# Patient Record
Sex: FEMALE | Race: WHITE | NOT HISPANIC OR LATINO | Employment: OTHER | URBAN - METROPOLITAN AREA
[De-identification: names, ages, dates, MRNs, and addresses within clinical notes are randomized per-mention and may not be internally consistent; named-entity substitution may affect disease eponyms.]

---

## 2017-04-05 ENCOUNTER — OFFICE VISIT (OUTPATIENT)
Dept: URGENT CARE | Facility: MEDICAL CENTER | Age: 57
End: 2017-04-05
Payer: MEDICARE

## 2017-04-05 PROCEDURE — 99213 OFFICE O/P EST LOW 20 MIN: CPT

## 2017-04-05 PROCEDURE — G0463 HOSPITAL OUTPT CLINIC VISIT: HCPCS

## 2017-05-19 ENCOUNTER — TRANSCRIBE ORDERS (OUTPATIENT)
Dept: ADMINISTRATIVE | Facility: HOSPITAL | Age: 57
End: 2017-05-19

## 2017-05-19 ENCOUNTER — APPOINTMENT (OUTPATIENT)
Dept: LAB | Facility: MEDICAL CENTER | Age: 57
End: 2017-05-19
Payer: MEDICARE

## 2017-05-19 DIAGNOSIS — R10.32 ABDOMINAL PAIN, LEFT LOWER QUADRANT: Primary | ICD-10-CM

## 2017-05-19 DIAGNOSIS — R11.10 VOMITING, INTRACTABILITY OF VOMITING NOT SPECIFIED, PRESENCE OF NAUSEA NOT SPECIFIED, UNSPECIFIED VOMITING TYPE: ICD-10-CM

## 2017-05-19 DIAGNOSIS — K76.0 FATTY METAMORPHOSIS OF LIVER: ICD-10-CM

## 2017-05-19 DIAGNOSIS — R11.0 NAUSEA ALONE: ICD-10-CM

## 2017-05-19 LAB
ALBUMIN SERPL BCP-MCNC: 3.7 G/DL (ref 3.5–5)
ALP SERPL-CCNC: 103 U/L (ref 46–116)
ALT SERPL W P-5'-P-CCNC: 27 U/L (ref 12–78)
ANION GAP SERPL CALCULATED.3IONS-SCNC: 7 MMOL/L (ref 4–13)
AST SERPL W P-5'-P-CCNC: 20 U/L (ref 5–45)
BILIRUB DIRECT SERPL-MCNC: 0.14 MG/DL (ref 0–0.2)
BILIRUB SERPL-MCNC: 0.37 MG/DL (ref 0.2–1)
BUN SERPL-MCNC: 16 MG/DL (ref 5–25)
CALCIUM SERPL-MCNC: 9.2 MG/DL (ref 8.3–10.1)
CHLORIDE SERPL-SCNC: 104 MMOL/L (ref 100–108)
CO2 SERPL-SCNC: 26 MMOL/L (ref 21–32)
CREAT SERPL-MCNC: 0.81 MG/DL (ref 0.6–1.3)
ERYTHROCYTE [DISTWIDTH] IN BLOOD BY AUTOMATED COUNT: 17.7 % (ref 11.6–15.1)
FERRITIN SERPL-MCNC: 13 NG/ML (ref 8–388)
GFR SERPL CREATININE-BSD FRML MDRD: >60 ML/MIN/1.73SQ M
GLUCOSE P FAST SERPL-MCNC: 95 MG/DL (ref 65–99)
HBV CORE AB SER QL: NORMAL
HBV CORE IGM SER QL: NORMAL
HBV SURFACE AG SER QL: NORMAL
HCT VFR BLD AUTO: 40.7 % (ref 34.8–46.1)
HCV AB SER QL: NORMAL
HGB BLD-MCNC: 12.8 G/DL (ref 11.5–15.4)
IRON SATN MFR SERPL: 10 %
IRON SERPL-MCNC: 43 UG/DL (ref 50–170)
LIPASE SERPL-CCNC: 155 U/L (ref 73–393)
MCH RBC QN AUTO: 24.7 PG (ref 26.8–34.3)
MCHC RBC AUTO-ENTMCNC: 31.4 G/DL (ref 31.4–37.4)
MCV RBC AUTO: 79 FL (ref 82–98)
PLATELET # BLD AUTO: 494 THOUSANDS/UL (ref 149–390)
PMV BLD AUTO: 11 FL (ref 8.9–12.7)
POTASSIUM SERPL-SCNC: 4.2 MMOL/L (ref 3.5–5.3)
PROT SERPL-MCNC: 8.4 G/DL (ref 6.4–8.2)
RBC # BLD AUTO: 5.18 MILLION/UL (ref 3.81–5.12)
SODIUM SERPL-SCNC: 137 MMOL/L (ref 136–145)
TIBC SERPL-MCNC: 436 UG/DL (ref 250–450)
WBC # BLD AUTO: 10.83 THOUSAND/UL (ref 4.31–10.16)

## 2017-05-19 PROCEDURE — 85027 COMPLETE CBC AUTOMATED: CPT | Performed by: INTERNAL MEDICINE

## 2017-05-19 PROCEDURE — 86704 HEP B CORE ANTIBODY TOTAL: CPT | Performed by: INTERNAL MEDICINE

## 2017-05-19 PROCEDURE — 82728 ASSAY OF FERRITIN: CPT | Performed by: INTERNAL MEDICINE

## 2017-05-19 PROCEDURE — 86705 HEP B CORE ANTIBODY IGM: CPT | Performed by: INTERNAL MEDICINE

## 2017-05-19 PROCEDURE — 80048 BASIC METABOLIC PNL TOTAL CA: CPT | Performed by: INTERNAL MEDICINE

## 2017-05-19 PROCEDURE — 83690 ASSAY OF LIPASE: CPT | Performed by: INTERNAL MEDICINE

## 2017-05-19 PROCEDURE — 87340 HEPATITIS B SURFACE AG IA: CPT | Performed by: INTERNAL MEDICINE

## 2017-05-19 PROCEDURE — 86235 NUCLEAR ANTIGEN ANTIBODY: CPT | Performed by: INTERNAL MEDICINE

## 2017-05-19 PROCEDURE — 36415 COLL VENOUS BLD VENIPUNCTURE: CPT | Performed by: INTERNAL MEDICINE

## 2017-05-19 PROCEDURE — 83550 IRON BINDING TEST: CPT | Performed by: INTERNAL MEDICINE

## 2017-05-19 PROCEDURE — 86038 ANTINUCLEAR ANTIBODIES: CPT | Performed by: INTERNAL MEDICINE

## 2017-05-19 PROCEDURE — 83540 ASSAY OF IRON: CPT | Performed by: INTERNAL MEDICINE

## 2017-05-19 PROCEDURE — 80076 HEPATIC FUNCTION PANEL: CPT | Performed by: INTERNAL MEDICINE

## 2017-05-19 PROCEDURE — 86803 HEPATITIS C AB TEST: CPT | Performed by: INTERNAL MEDICINE

## 2017-05-20 LAB — ACTIN IGG SERPL-ACNC: 10 UNITS (ref 0–19)

## 2017-05-22 LAB — RYE IGE QN: NEGATIVE

## 2018-01-13 NOTE — PROGRESS NOTES
Assessment   1  Scabies (133 0) (B86)    Plan   Scabies    · Permethrin 5 % External Cream; MASSAGE INTO SKIN FROM HEAD TO SOLES OF    FEET  [address] OFF AFTER 8-14 HOURS    Discussion/Summary   Discussion Summary:    Use permethrin cream as directed  Medication Side Effects Reviewed: Possible side effects of new medications were reviewed with the patient/guardian today  Understands and agrees with treatment plan: The treatment plan was reviewed with the patient/guardian  The patient/guardian understands and agrees with the treatment plan    Counseling Documentation With Imm: The patient was counseled regarding diagnostic results,-- instructions for management,-- risk factor reductions,-- prognosis,-- patient and family education,-- impressions,-- risks and benefits of treatment options,-- importance of compliance with treatment  Follow Up Instructions: Follow Up with your Primary Care Provider in 1-2 days  If your symptoms worsen, go to the nearest [place] [address] Emergency Department  Chief Complaint   1  Pain  Chief Complaint Free Text Note Form: Patient states that she is having whole body pain and itchy which started 3 days ago  She has been taking Benadryl with no release  Her last dose was at noon      History of Present Illness   Hospital Based Practices Required Assessment:      Pain Assessment      the patient states they have pain  The pain is located in the whole pain  The patient describes the pain as sharp  (on a scale of 0 to 10, the patient rates the pain at 1 )      Abuse And Domestic Violence Screen       Yes, the patient is safe at home  -- The patient states no one is hurting them  Depression And Suicide Screen  No, the patient has not had thoughts of hurting themself  No, the patient has not felt depressed in the past 7 days  Prefered Language is  english  Primary Language is  english  Readiness To Learn: Receptive  Barriers To Learning: none  Preferred Learning: verbal    Scabies (Brief): The patient is being seen for an initial evaluation of scabies  Symptoms:  rash,-- widespread rash,-- grouped skin lesions,-- generalized pruritus-- and-- nocturnal pruritus, but-- no localized pruritus  The patient is currently experiencing symptoms  Review of Systems   Focused-Female:      Constitutional: No fever, no chills, feels well, no tiredness, no recent weight gain or loss  ENT: no ear ache, no loss of hearing, no nosebleeds or nasal discharge, no sore throat or hoarseness  Cardiovascular: no complaints of slow or fast heart rate, no chest pain, no palpitations, no leg claudication or lower extremity edema  Respiratory: no complaints of shortness of breath, no wheezing, no dyspnea on exertion, no orthopnea or PND  Breasts: no complaints of breast pain, breast lump or nipple discharge  Gastrointestinal: no complaints of abdominal pain, no constipation, no nausea or diarrhea, no vomiting, no bloody stools  Genitourinary: no complaints of dysuria, no incontinence, no pelvic pain, no dysmenorrhea, no vaginal discharge or abnormal vaginal bleeding  Musculoskeletal: no complaints of arthralgia, no myalgia, no joint swelling or stiffness, no limb pain or swelling  Integumentary: no complaints of skin rash or lesion, no itching or dry skin, no skin wounds-- and-- as noted in HPI  Neurological: no complaints of headache, no confusion, no numbness or tingling, no dizziness or fainting  Active Problems   1  Cervical spondylosis (721 0) (Y09 580)    Past Medical History   Active Problems And Past Medical History Reviewed: The active problems and past medical history were reviewed and updated today  Family History   Family History    1  Family history of Arthritis (716 90) (M19 90)   2  Family history of Cancer (199 1) (C80 1)   3  Family history of Osteoporosis (733 00) (M81 0)  Family History Reviewed:     The family history was reviewed and updated today  Social History    · Nonsmoker (V41 89) (Z78 9)  Social History Reviewed: The social history was reviewed and updated today  Surgical History   1  History of Back Surgery   2  History of Tubal Ligation  Surgical History Reviewed: The surgical history was reviewed and updated today  Current Meds    1  Benazepril-Hydrochlorothiazide 10-12 5 MG Oral Tablet; Therapy: 69YKU6706 to (Evaluate:59Rbu0825) Recorded   2  Gabapentin 300 MG Oral Capsule; Therapy: 96MUP0502 to (Evaluate:73Dft2063) Recorded   3  Methocarbamol 500 MG Oral Tablet; Therapy: 18YTW8481 to (Evaluate:10Yof9858) Recorded   4  MethylPREDNISolone (Parth) 4 MG TABS; take as directed on patient instruction card; Therapy: 62EGY0494 to (Last Rx:19Jun2014)  Requested for: 72CQB7022 Ordered   5  Naproxen 500 MG Oral Tablet; Therapy: 50URX9553 to (Evaluate:29Jun2014) Recorded   6  Omeprazole 20 MG Oral Capsule Delayed Release; Therapy: 89XAX5207 to (Evaluate:73Jmy0190) Recorded  Medication List Reviewed: The medication list was reviewed and updated today  Allergies   1  Penicillins    Vitals   Signs   Recorded: 99SJS5971 03:16PM   Temperature: 98 6 F  Heart Rate: 112  Respiration: 16  Systolic: 073  Diastolic: 80  Weight: 288 lb   BMI Calculated: 39 53  BSA Calculated: 2 28  O2 Saturation: 98  Pain Scale: 0    Physical Exam        Constitutional      General appearance: No acute distress, well appearing and well nourished  Pulmonary      Respiratory effort: No increased work of breathing or signs of respiratory distress  Auscultation of lungs: Clear to auscultation  Cardiovascular      Auscultation of heart: Normal rate and rhythm, normal S1 and S2, without murmurs  Lymphatic      Palpation of lymph nodes in neck: No lymphadenopathy         Skin      Examination of the skin for lesions: Abnormal  -- Erythematous papular lesions on flexor surface of bilateral arms, knees and abdomen        Signatures    Electronically signed by : Liz Gaines, [address]; Jan 12 2018 12:29AM EST                       (Author)     Electronically signed by : SKIP Jhaveri ; Jan 12 2018  3:51PM EST                       (Co-author)

## 2018-09-06 ENCOUNTER — APPOINTMENT (OUTPATIENT)
Dept: LAB | Facility: MEDICAL CENTER | Age: 58
End: 2018-09-06
Payer: MEDICARE

## 2018-09-06 ENCOUNTER — TRANSCRIBE ORDERS (OUTPATIENT)
Dept: ADMINISTRATIVE | Facility: HOSPITAL | Age: 58
End: 2018-09-06

## 2018-09-06 DIAGNOSIS — B96.81 GASTRITIS, HELICOBACTER PYLORI: Primary | ICD-10-CM

## 2018-09-06 DIAGNOSIS — K29.70 GASTRITIS, HELICOBACTER PYLORI: Primary | ICD-10-CM

## 2018-09-06 PROCEDURE — 87338 HPYLORI STOOL AG IA: CPT | Performed by: NURSE PRACTITIONER

## 2018-09-07 LAB — H PYLORI AG STL QL IA: NEGATIVE

## 2018-09-20 ENCOUNTER — TRANSCRIBE ORDERS (OUTPATIENT)
Dept: ADMINISTRATIVE | Facility: HOSPITAL | Age: 58
End: 2018-09-20

## 2018-09-20 DIAGNOSIS — K76.0 FATTY METAMORPHOSIS OF LIVER: ICD-10-CM

## 2018-09-20 DIAGNOSIS — R10.11 ABDOMINAL PAIN, RIGHT UPPER QUADRANT: Primary | ICD-10-CM

## 2018-09-20 DIAGNOSIS — R94.5 NONSPECIFIC ABNORMAL RESULTS OF LIVER FUNCTION STUDY: ICD-10-CM

## 2018-09-28 ENCOUNTER — HOSPITAL ENCOUNTER (OUTPATIENT)
Dept: RADIOLOGY | Facility: MEDICAL CENTER | Age: 58
Discharge: HOME | End: 2018-09-28
Payer: MEDICARE

## 2018-09-28 ENCOUNTER — TRANSCRIBE ORDERS (OUTPATIENT)
Dept: ADMINISTRATIVE | Facility: HOSPITAL | Age: 58
End: 2018-09-28

## 2018-09-28 ENCOUNTER — APPOINTMENT (OUTPATIENT)
Dept: LAB | Facility: MEDICAL CENTER | Age: 58
End: 2018-09-28
Payer: MEDICARE

## 2018-09-28 DIAGNOSIS — K76.0 FATTY LIVER: Primary | ICD-10-CM

## 2018-09-28 DIAGNOSIS — K76.0 FATTY METAMORPHOSIS OF LIVER: ICD-10-CM

## 2018-09-28 DIAGNOSIS — R79.89 ABNORMAL LFTS: ICD-10-CM

## 2018-09-28 DIAGNOSIS — R94.5 NONSPECIFIC ABNORMAL RESULTS OF LIVER FUNCTION STUDY: ICD-10-CM

## 2018-09-28 DIAGNOSIS — K76.0 FATTY LIVER: ICD-10-CM

## 2018-09-28 DIAGNOSIS — R10.11 ABDOMINAL PAIN, RIGHT UPPER QUADRANT: ICD-10-CM

## 2018-09-28 LAB
ALBUMIN SERPL BCP-MCNC: 3.7 G/DL (ref 3.5–5)
ALP SERPL-CCNC: 110 U/L (ref 46–116)
ALT SERPL W P-5'-P-CCNC: 33 U/L (ref 12–78)
AST SERPL W P-5'-P-CCNC: 25 U/L (ref 5–45)
BILIRUB DIRECT SERPL-MCNC: 0.17 MG/DL (ref 0–0.2)
BILIRUB SERPL-MCNC: 0.55 MG/DL (ref 0.2–1)
PROT SERPL-MCNC: 8.8 G/DL (ref 6.4–8.2)

## 2018-09-28 PROCEDURE — 36415 COLL VENOUS BLD VENIPUNCTURE: CPT

## 2018-09-28 PROCEDURE — 76705 ECHO EXAM OF ABDOMEN: CPT

## 2018-09-28 PROCEDURE — 80076 HEPATIC FUNCTION PANEL: CPT

## 2019-06-26 ENCOUNTER — APPOINTMENT (OUTPATIENT)
Dept: LAB | Facility: MEDICAL CENTER | Age: 59
End: 2019-06-26
Payer: MEDICARE

## 2019-06-26 ENCOUNTER — TRANSCRIBE ORDERS (OUTPATIENT)
Dept: ADMINISTRATIVE | Facility: HOSPITAL | Age: 59
End: 2019-06-26

## 2019-06-26 DIAGNOSIS — K76.0 FATTY LIVER: Primary | ICD-10-CM

## 2019-06-26 DIAGNOSIS — E78.00 PURE HYPERCHOLESTEROLEMIA: Primary | ICD-10-CM

## 2019-06-26 DIAGNOSIS — E03.9 HYPOTHYROIDISM, UNSPECIFIED TYPE: ICD-10-CM

## 2019-06-26 DIAGNOSIS — I10 ESSENTIAL HYPERTENSION, BENIGN: ICD-10-CM

## 2019-06-26 DIAGNOSIS — I49.9 VENTRICULAR ARRHYTHMIA: ICD-10-CM

## 2019-06-26 DIAGNOSIS — R79.89 ELEVATED LFTS: ICD-10-CM

## 2019-06-26 LAB
ALBUMIN SERPL BCP-MCNC: 3.6 G/DL (ref 3.5–5)
ALP SERPL-CCNC: 102 U/L (ref 46–116)
ALT SERPL W P-5'-P-CCNC: 29 U/L (ref 12–78)
ANION GAP SERPL CALCULATED.3IONS-SCNC: 8 MMOL/L (ref 4–13)
AST SERPL W P-5'-P-CCNC: 30 U/L (ref 5–45)
BASOPHILS # BLD AUTO: 0.07 THOUSANDS/ΜL (ref 0–0.1)
BASOPHILS NFR BLD AUTO: 1 % (ref 0–1)
BILIRUB DIRECT SERPL-MCNC: 0.15 MG/DL (ref 0–0.2)
BILIRUB SERPL-MCNC: 0.39 MG/DL (ref 0.2–1)
BUN SERPL-MCNC: 14 MG/DL (ref 5–25)
CALCIUM SERPL-MCNC: 9 MG/DL (ref 8.3–10.1)
CHLORIDE SERPL-SCNC: 101 MMOL/L (ref 100–108)
CHOLEST SERPL-MCNC: 135 MG/DL (ref 50–200)
CO2 SERPL-SCNC: 26 MMOL/L (ref 21–32)
CREAT SERPL-MCNC: 0.9 MG/DL (ref 0.6–1.3)
EOSINOPHIL # BLD AUTO: 0.38 THOUSAND/ΜL (ref 0–0.61)
EOSINOPHIL NFR BLD AUTO: 4 % (ref 0–6)
ERYTHROCYTE [DISTWIDTH] IN BLOOD BY AUTOMATED COUNT: 14.4 % (ref 11.6–15.1)
GFR SERPL CREATININE-BSD FRML MDRD: 72 ML/MIN/1.73SQ M
GLUCOSE P FAST SERPL-MCNC: 129 MG/DL (ref 65–99)
HCT VFR BLD AUTO: 44.1 % (ref 34.8–46.1)
HDLC SERPL-MCNC: 50 MG/DL (ref 40–60)
HGB BLD-MCNC: 14.1 G/DL (ref 11.5–15.4)
IMM GRANULOCYTES # BLD AUTO: 0.1 THOUSAND/UL (ref 0–0.2)
IMM GRANULOCYTES NFR BLD AUTO: 1 % (ref 0–2)
LDLC SERPL CALC-MCNC: 66 MG/DL (ref 0–100)
LYMPHOCYTES # BLD AUTO: 2.38 THOUSANDS/ΜL (ref 0.6–4.47)
LYMPHOCYTES NFR BLD AUTO: 22 % (ref 14–44)
MCH RBC QN AUTO: 28.1 PG (ref 26.8–34.3)
MCHC RBC AUTO-ENTMCNC: 32 G/DL (ref 31.4–37.4)
MCV RBC AUTO: 88 FL (ref 82–98)
MONOCYTES # BLD AUTO: 0.73 THOUSAND/ΜL (ref 0.17–1.22)
MONOCYTES NFR BLD AUTO: 7 % (ref 4–12)
NEUTROPHILS # BLD AUTO: 7.2 THOUSANDS/ΜL (ref 1.85–7.62)
NEUTS SEG NFR BLD AUTO: 65 % (ref 43–75)
NONHDLC SERPL-MCNC: 85 MG/DL
NRBC BLD AUTO-RTO: 0 /100 WBCS
PLATELET # BLD AUTO: 427 THOUSANDS/UL (ref 149–390)
PMV BLD AUTO: 11.1 FL (ref 8.9–12.7)
POTASSIUM SERPL-SCNC: 3.8 MMOL/L (ref 3.5–5.3)
PROT SERPL-MCNC: 8.6 G/DL (ref 6.4–8.2)
RBC # BLD AUTO: 5.02 MILLION/UL (ref 3.81–5.12)
SODIUM SERPL-SCNC: 135 MMOL/L (ref 136–145)
TRIGL SERPL-MCNC: 93 MG/DL
TSH SERPL DL<=0.05 MIU/L-ACNC: 1.16 UIU/ML (ref 0.36–3.74)
WBC # BLD AUTO: 10.86 THOUSAND/UL (ref 4.31–10.16)

## 2019-06-26 PROCEDURE — 82248 BILIRUBIN DIRECT: CPT | Performed by: INTERNAL MEDICINE

## 2019-06-26 PROCEDURE — 80053 COMPREHEN METABOLIC PANEL: CPT | Performed by: FAMILY MEDICINE

## 2019-06-26 PROCEDURE — 84443 ASSAY THYROID STIM HORMONE: CPT | Performed by: FAMILY MEDICINE

## 2019-06-26 PROCEDURE — 85025 COMPLETE CBC W/AUTO DIFF WBC: CPT | Performed by: FAMILY MEDICINE

## 2019-06-26 PROCEDURE — 36415 COLL VENOUS BLD VENIPUNCTURE: CPT | Performed by: INTERNAL MEDICINE

## 2019-06-26 PROCEDURE — 80061 LIPID PANEL: CPT | Performed by: FAMILY MEDICINE

## 2019-07-12 ENCOUNTER — OFFICE VISIT (OUTPATIENT)
Dept: URGENT CARE | Facility: MEDICAL CENTER | Age: 59
End: 2019-07-12
Payer: MEDICARE

## 2019-07-12 VITALS
HEIGHT: 67 IN | TEMPERATURE: 98 F | DIASTOLIC BLOOD PRESSURE: 75 MMHG | OXYGEN SATURATION: 97 % | WEIGHT: 293 LBS | HEART RATE: 110 BPM | BODY MASS INDEX: 45.99 KG/M2 | RESPIRATION RATE: 18 BRPM | SYSTOLIC BLOOD PRESSURE: 148 MMHG

## 2019-07-12 DIAGNOSIS — S46.811A TRAPEZIUS STRAIN, RIGHT, INITIAL ENCOUNTER: Primary | ICD-10-CM

## 2019-07-12 PROCEDURE — G0463 HOSPITAL OUTPT CLINIC VISIT: HCPCS | Performed by: PHYSICIAN ASSISTANT

## 2019-07-12 PROCEDURE — 99213 OFFICE O/P EST LOW 20 MIN: CPT | Performed by: PHYSICIAN ASSISTANT

## 2019-07-12 RX ORDER — PREDNISONE 50 MG/1
50 TABLET ORAL DAILY
Qty: 5 TABLET | Refills: 0 | Status: SHIPPED | OUTPATIENT
Start: 2019-07-12 | End: 2019-08-14 | Stop reason: ALTCHOICE

## 2019-07-12 RX ORDER — CYCLOBENZAPRINE HCL 10 MG
10 TABLET ORAL 3 TIMES DAILY PRN
Qty: 10 TABLET | Refills: 0 | Status: SHIPPED | OUTPATIENT
Start: 2019-07-12 | End: 2019-08-14 | Stop reason: ALTCHOICE

## 2019-07-12 NOTE — PROGRESS NOTES
[facility]        NAME: Shwetha Galeano is a 66 y o  female  : 1960    MRN: [id number]  DATE: 2019  TIME: 6:58 PM    Assessment and Plan   Trapezius strain, right, initial encounter [P07 267G]  1  Trapezius strain, right, initial encounter  predniSONE 50 mg tablet    cyclobenzaprine (FLEXERIL) 10 mg tablet         Patient Instructions     Steroid as directed  Muscle relaxer as directed  This can make you drowsy  Ice area  Gentle shoulder and neck stretches  Follow up with PCP in 3-5 days  Proceed to  ER if symptoms worsen  Chief Complaint     Chief Complaint   Patient presents with    Shoulder Pain     Right shoulder pain x 4 days  Pt denies injury  History of Present Illness       Pt is a 66 yr old female presenting for right shoulder and neck pain that began 3 days ago when she woke up  It was mild but has started to worsen  She has increased pain moving her arm  She reports numbness to the lower posterior head  No numbness/tingling down arm  She denies any injury or strenuous activity  She has been taking Motrin 2 tabs q4h without relief and icing  Review of Systems   Review of Systems   Constitutional: Negative for chills, fatigue and fever  Musculoskeletal: Positive for arthralgias, myalgias and neck pain  Negative for joint swelling  Skin: Negative for color change, rash and wound  Neurological: Positive for numbness           Current Medications       Current Outpatient Medications:     benazepril-hydrochlorthiazide (LOTENSIN HCT) 10-12 5 MG per tablet, TAKE 1 TABLET EVERY DAY, Disp: , Rfl:     Omeprazole 20 MG TBDD, Take 20 mg by mouth daily, Disp: , Rfl:     cyclobenzaprine (FLEXERIL) 10 mg tablet, Take 1 tablet (10 mg total) by mouth 3 (three) times a day as needed for muscle spasms, Disp: 10 tablet, Rfl: 0    predniSONE 50 mg tablet, Take 1 tablet (50 mg total) by mouth daily, Disp: 5 tablet, Rfl: 0    Current Allergies     Allergies as of 07/12/2019 - Reviewed 07/12/2019   Allergen Reaction Noted    Penicillins  06/19/2014            The following portions of the patient's history were reviewed and updated as appropriate: allergies, current medications, past family history, past medical history, past social history, past surgical history and problem list      Past Medical History:   Diagnosis Date    Fatty liver     GERD (gastroesophageal reflux disease)     Hypertension        Past Surgical History:   Procedure Laterality Date    BACK SURGERY      FINGER SURGERY      SINUS SURGERY      TUBAL LIGATION         Family History   Problem Relation Age of Onset    Heart disease Mother          Medications have been verified  Objective   /75   Pulse (!) 110   Temp 98 °F (36 7 °C) (Temporal)   Resp 18   Ht 5' 7" (1 702 m)   Wt 134 kg (294 lb 12 8 oz)   SpO2 97%   BMI 46 17 kg/m²        Physical Exam     Physical Exam   Constitutional: She is oriented to person, place, and time  She appears well-developed and well-nourished  No distress  HENT:   Head: Normocephalic and atraumatic  Eyes: Conjunctivae are normal    Pulmonary/Chest: Effort normal    Musculoskeletal:        Right shoulder: She exhibits decreased range of motion (limited ROM of arm due to pain  strength of right arm 4/5), tenderness (diffuse TTP to shoulder and right trapzius muscle  also tender to right side of neck) and decreased strength  She exhibits no swelling, no effusion, no deformity, no spasm and normal pulse  No rash seen   Neurological: She is alert and oriented to person, place, and time  Skin: Skin is warm and dry  No rash noted  She is not diaphoretic  Psychiatric: She has a normal mood and affect   Her behavior is normal

## 2019-08-01 ENCOUNTER — TELEPHONE (OUTPATIENT)
Dept: OBGYN CLINIC | Facility: HOSPITAL | Age: 59
End: 2019-08-01

## 2019-08-01 NOTE — TELEPHONE ENCOUNTER
Patient called to be seen for back pain  Was seen in  care now on 7/12/19  No recent testing  Please call patient to triage  Thank you

## 2019-08-05 ENCOUNTER — TELEPHONE (OUTPATIENT)
Dept: PHYSICAL THERAPY | Facility: OTHER | Age: 59
End: 2019-08-05

## 2019-08-05 ENCOUNTER — NURSE TRIAGE (OUTPATIENT)
Dept: PHYSICAL THERAPY | Facility: OTHER | Age: 59
End: 2019-08-05

## 2019-08-05 DIAGNOSIS — M54.6 ACUTE MIDLINE THORACIC BACK PAIN: Primary | ICD-10-CM

## 2019-08-05 NOTE — TELEPHONE ENCOUNTER
Additional Information   Negative: Is the patient experiencing acute drop foot or paralysis? Pt stated that her legs and feet do get numb at times    Background - Initial Assessment  Clinical complaint: Acute Thoracic, radiates to neck, goes to right shoulder  Pt stated that she had a fall in January of this year  Pt has in creased pain with movement  Pt is taking Motrin for the pain  Pain quality is constant, stated "feels like something is pushing in the middle of my back"  Pt was seen in Urgent care 7/12/2019  Date of onset: 1 month  Mechanism of injury: none     Pt stated that she has DDD, surgery diskectomy in lumbar area 2005, was also told that she arthritis  Previous Treatment - Previous Treatment  Previous evaluation: Neurosurgeon, Pain management, Ortho  Current provider: pcp  Diagnostics: none  Previous treatment: physical therapy, medication, back injections    Protocols used: [facility] PROTOCOL    This nurse did review in detail the comp spine program and what we can provide for the pt for their back pain  Pt is agreeable to being triaged by this nurse and does not wish to do physical therapy  Referral was placed to the following:  PM&R with Roderick CAMARGO at [address] [facility] [address] at the [facility]  Pt is aware that they will be receiving a phone call from that office to make their appointments  Pt is aware of who they will be seeing and location of that office  No further questions voiced at this time and Pt did state understanding of the referral that was placed

## 2019-08-05 NOTE — TELEPHONE ENCOUNTER
Message left for Pt to call us back  Our ph # and hours of business provided  Waiting for return call from Pt  There is no referral for this Pt

## 2019-08-09 NOTE — PROGRESS NOTES
Assessment/Plan:      Diagnoses and all orders for this visit:    Chronic right-sided thoracic back pain  -     XR spine cervical 2 or 3 vw injury; Future  -     XR spine thoracic 3 vw; Future  -     predniSONE 10 mg tablet; Take 6 tablets by mouth on day 1, 5 tablets day 2, 4 tablets day 3, 3 tablets day 4, 2 tablets day 5, and 1 tablet on day 6   -     Ambulatory referral to Physical Therapy; Future    Trapezius muscle spasm  -     XR spine cervical 2 or 3 vw injury; Future  -     XR spine thoracic 3 vw; Future  -     methocarbamol (ROBAXIN) 500 mg tablet; Take 1 tablet (500 mg total) by mouth 3 (three) times a day  -     predniSONE 10 mg tablet; Take 6 tablets by mouth on day 1, 5 tablets day 2, 4 tablets day 3, 3 tablets day 4, 2 tablets day 5, and 1 tablet on day 6   -     Ambulatory referral to Physical Therapy; Future    Neck pain  -     XR spine cervical 2 or 3 vw injury; Future  -     Ambulatory referral to Physical Therapy; Future    Chronic right shoulder pain  -     Ambulatory referral to Physical Therapy; Future    Acute midline thoracic back pain  -     Ambulatory referral to Physical Medicine Rehab      Discussion: 65 yo right hand dominant female presenting for chronic mid and upper back pain since fall in February of 2019  Will order plain films to r/o nonhealing fracture and provide short prescription of muscle relaxer and prednisone taper for pain relief during therapy  Reprised of most common side effects and advised to avoid oral NSAIDs while on this medication  Discussed benefits of weight loss in overall pain reduction, in addition to restarting membrane stabilizer medication from PCP for overall pain relief as I do believe there is a large neuropathic pain component to her sx  Patient agrees to above plan and will follow up with this office if pain does not improve or worsens         The patient was advised that muscle relaxant medications have very important potential side effects including dry mouth, dizziness, and drowsiness/fatigue  She was asked to stop medication if she experiences any adverse side effects, and additionally advised to avoid alcohol, and driving/operating heavy machinery while taking this medication  The patient expresses understanding of these issues and questions were answered  Subjective:     Patient ID: Kolby Sy is a 66 y o  female  HPI Referral from [facility] for thoracic back pain  Hx of chronic LBP with DDD and lumbar diskectomy in 2005  Fall in February onto back without imaging or evaluation  Denies hitting head or LOC  Since that time worsening thoracic and cervical pain  Some substernal chest pain and saw ED with normal cardiac workup  Describes as a constant knotted pain in right thoracic, sub scapular, cervical and shoulder  Nothing exacerbates or alleviates  Pain into right tricep region but denies N/T or weakness  Denies visual changes, B/B changes or saddle anesthesia  Care now in July prescribed prednisone and flexeril with some improvement  Denies any other treatment for this issue  Hx of lumbar sx  Was taking gabapentin for LE neuropathy and chronic pain with benefit however stopped after switching PCP  ADLs limited to pain  Does not work  No specific alleviating factors  Any prolonged movement or activity exacerbates       PAST MEDICAL HISTORY  Past Medical History:   Diagnosis Date    Fatty liver     GERD (gastroesophageal reflux disease)     Hypertension      PAST SURGICAL HISTORY  Past Surgical History:   Procedure Laterality Date    BACK SURGERY      FINGER SURGERY      SINUS SURGERY      TUBAL LIGATION         FAMILY HISTORY  Family History   Problem Relation Age of Onset    Heart disease Mother      HOME MEDICATIONS  Current Outpatient Medications   Medication Sig Dispense Refill    benazepril-hydrochlorthiazide (LOTENSIN HCT) 10-12 5 MG per tablet TAKE 1 TABLET EVERY DAY      Omeprazole 20 MG TBDD Take 20 mg by mouth daily      methocarbamol (ROBAXIN) 500 mg tablet Take 1 tablet (500 mg total) by mouth 3 (three) times a day 30 tablet 1    predniSONE 10 mg tablet Take 6 tablets by mouth on day 1, 5 tablets day 2, 4 tablets day 3, 3 tablets day 4, 2 tablets day 5, and 1 tablet on day 6  21 tablet 0     No current facility-administered medications for this visit  ALLERGIES  Penicillins      SOCIAL HISTORY  Social History     Substance and Sexual Activity   Alcohol Use Not on file     Social History     Substance and Sexual Activity   Drug Use Not on file     Social History     Tobacco Use   Smoking Status Never Smoker   Smokeless Tobacco Never Used       Review of Systems   Constitutional: Negative for chills, diaphoresis, fever and unexpected weight change  HENT: Negative for trouble swallowing  Eyes: Negative for visual disturbance  Respiratory: Negative for shortness of breath  Cardiovascular: Negative for chest pain and leg swelling  Gastrointestinal: Negative for constipation and diarrhea  Endocrine: Negative for cold intolerance and heat intolerance  Genitourinary: Negative for decreased urine volume and difficulty urinating  Musculoskeletal: Positive for arthralgias (right shoulder), back pain, myalgias, neck pain and neck stiffness  Negative for gait problem and joint swelling  Skin: Positive for rash (upper and lower extremity )  Negative for color change, pallor and wound  Allergic/Immunologic: Negative for immunocompromised state  Neurological: Negative for dizziness, syncope, weakness and light-headedness  Psychiatric/Behavioral: Positive for sleep disturbance  Objective:  Vitals:    08/14/19 1005   BP: 140/92   Pulse: 76       Imaging:  No images are attached to the encounter      Labs:  Transcribe Orders on 06/26/2019   Component Date Value Ref Range Status    Sodium 06/26/2019 135* 136 - 145 mmol/L Final    Potassium 06/26/2019 3 8  3 5 - 5 3 mmol/L Final    Chloride 06/26/2019 101  100 - 108 mmol/L Final    CO2 06/26/2019 26  21 - 32 mmol/L Final    ANION GAP 06/26/2019 8  4 - 13 mmol/L Final    BUN 06/26/2019 14  5 - 25 mg/dL Final    Creatinine 06/26/2019 0 90  0 60 - 1 30 mg/dL Final    Standardized to IDMS reference method    Glucose, Fasting 06/26/2019 129* 65 - 99 mg/dL Final      Specimen collection should occur prior to Sulfasalazine administration due to the potential for falsely depressed results  Specimen collection should occur prior to Sulfapyridine administration due to the potential for falsely elevated results   Calcium 06/26/2019 9 0  8 3 - 10 1 mg/dL Final    AST 06/26/2019 30  5 - 45 U/L Final      Specimen collection should occur prior to Sulfasalazine administration due to the potential for falsely depressed results   ALT 06/26/2019 29  12 - 78 U/L Final      Specimen collection should occur prior to Sulfasalazine and/or Sulfapyridine administration due to the potential for falsely depressed results   Alkaline Phosphatase 06/26/2019 102  46 - 116 U/L Final    Total Protein 06/26/2019 8 6* 6 4 - 8 2 g/dL Final    Albumin 06/26/2019 3 6  3 5 - 5 0 g/dL Final    Total Bilirubin 06/26/2019 0 39  0 20 - 1 00 mg/dL Final    eGFR 06/26/2019 72  ml/min/1 73sq m Final    Cholesterol 06/26/2019 135  50 - 200 mg/dL Final      Cholesterol:       Desirable         <200 mg/dl       Borderline         200-239 mg/dl       High              >239           Triglycerides 06/26/2019 93  <=150 mg/dL Final      Triglyceride:     Normal          <150 mg/dl     Borderline High 150-199 mg/dl     High            200-499 mg/dl        Very High       >499 mg/dl    Specimen collection should occur prior to N-Acetylcysteine or Metamizole administration due to the potential for falsely depressed results      HDL, Direct 06/26/2019 50  40 - 60 mg/dL Final      HDL Cholesterol:       High    >60 mg/dL       Low     <41 mg/dL  Specimen collection should occur prior to Metamizole administration due to the potential for falsley depressed results   LDL Calculated 06/26/2019 66  0 - 100 mg/dL Final      LDL Cholesterol:     Optimal           <100 mg/dl     Near Optimal      100-129 mg/dl     Above Optimal       Borderline High 130-159 mg/dl       High            160-189 mg/dl       Very High       >189 mg/dl         This screening LDL is a calculated result  It does not have the accuracy of the Direct Measured LDL in the monitoring of patients with hyperlipidemia and/or statin therapy  Direct Measure LDL (CRJ558) must be ordered separately in these patients      Non-HDL-Chol (CHOL-HDL) 06/26/2019 85  mg/dl Final    WBC 06/26/2019 10 86* 4 31 - 10 16 Thousand/uL Final    RBC 06/26/2019 5 02  3 81 - 5 12 Million/uL Final    Hemoglobin 06/26/2019 14 1  11 5 - 15 4 g/dL Final    Hematocrit 06/26/2019 44 1  34 8 - 46 1 % Final    MCV 06/26/2019 88  82 - 98 fL Final    MCH 06/26/2019 28 1  26 8 - 34 3 pg Final    MCHC 06/26/2019 32 0  31 4 - 37 4 g/dL Final    RDW 06/26/2019 14 4  11 6 - 15 1 % Final    MPV 06/26/2019 11 1  8 9 - 12 7 fL Final    Platelets 90/81/5795 427* 149 - 390 Thousands/uL Final    nRBC 06/26/2019 0  /100 WBCs Final    Neutrophils Relative 06/26/2019 65  43 - 75 % Final    Immat GRANS % 06/26/2019 1  0 - 2 % Final    Lymphocytes Relative 06/26/2019 22  14 - 44 % Final    Monocytes Relative 06/26/2019 7  4 - 12 % Final    Eosinophils Relative 06/26/2019 4  0 - 6 % Final    Basophils Relative 06/26/2019 1  0 - 1 % Final    Neutrophils Absolute 06/26/2019 7 20  1 85 - 7 62 Thousands/µL Final    Immature Grans Absolute 06/26/2019 0 10  0 00 - 0 20 Thousand/uL Final    Lymphocytes Absolute 06/26/2019 2 38  0 60 - 4 47 Thousands/µL Final    Monocytes Absolute 06/26/2019 0 73  0 17 - 1 22 Thousand/µL Final    Eosinophils Absolute 06/26/2019 0 38  0 00 - 0 61 Thousand/µL Final    Basophils Absolute 06/26/2019 0 07 0 00 - 0 10 Thousands/µL Final    TSH [address] [place] [address] 06/26/2019 1 160  0 358 - 3 740 uIU/mL Final      The recommended reference ranges for TSH during pregnancy are as follows:   First trimester 0 1 to 2 5 uIU/mL   Second trimester  0 2 to 3 0 uIU/mL   Third trimester 0 3 to 3 0 uIU/m    Note: Normal ranges may not apply to patients who are transgender, non-binary, or whose legal sex, sex at birth, and gender identity differ  Using supplements with high doses of biotin 20 to more than 300 times greater than the adequate daily intake for adults of 30 mcg/day as established by the [employer], can cause falsely depress results  Physical Exam   Constitutional: She is oriented to person, place, and time  She appears well-developed and well-nourished  No distress  HENT:   Head: Normocephalic and atraumatic  Eyes: Pupils are equal, round, and reactive to light  Conjunctivae are normal    Neck: Neck supple  Cardiovascular: Intact distal pulses  Pulmonary/Chest: Effort normal  No respiratory distress  Musculoskeletal:        Right shoulder: She exhibits decreased range of motion, tenderness and bony tenderness (AC)  She exhibits no swelling, no effusion and no crepitus  Cervical back: She exhibits decreased range of motion, tenderness and spasm  She exhibits no bony tenderness  Thoracic back: She exhibits decreased range of motion, tenderness, pain and spasm  She exhibits no bony tenderness  Diffuse TTP right trap  - Empty Can, - [place]king's   Neurological: She is alert and oriented to person, place, and time  She displays normal reflexes  No cranial nerve deficit or sensory deficit  She exhibits normal muscle tone  Gait (slight antalgic with assistance of SPC) abnormal  Coordination normal    Reflex Scores:       Tricep reflexes are 2+ on the right side and 2+ on the left side  Bicep reflexes are 2+ on the right side and 2+ on the left side         Brachioradialis reflexes are 2+ on the right side and 2+ on the left side  Skin: Skin is warm and dry  Rash (multiple scaling, healing plaques on upper and lower extremities) noted  She is not diaphoretic  No pallor  Psychiatric: Her behavior is normal  Judgment and thought content normal  She exhibits a depressed mood  She is attentive  Vitals reviewed

## 2019-08-14 ENCOUNTER — OFFICE VISIT (OUTPATIENT)
Dept: PAIN MEDICINE | Facility: CLINIC | Age: 59
End: 2019-08-14
Payer: MEDICARE

## 2019-08-14 VITALS
HEIGHT: 67 IN | WEIGHT: 293 LBS | BODY MASS INDEX: 45.99 KG/M2 | HEART RATE: 76 BPM | DIASTOLIC BLOOD PRESSURE: 92 MMHG | SYSTOLIC BLOOD PRESSURE: 140 MMHG

## 2019-08-14 DIAGNOSIS — M54.6 CHRONIC RIGHT-SIDED THORACIC BACK PAIN: Primary | ICD-10-CM

## 2019-08-14 DIAGNOSIS — M54.2 NECK PAIN: ICD-10-CM

## 2019-08-14 DIAGNOSIS — M25.511 CHRONIC RIGHT SHOULDER PAIN: ICD-10-CM

## 2019-08-14 DIAGNOSIS — M62.838 TRAPEZIUS MUSCLE SPASM: ICD-10-CM

## 2019-08-14 DIAGNOSIS — G89.29 CHRONIC RIGHT-SIDED THORACIC BACK PAIN: Primary | ICD-10-CM

## 2019-08-14 DIAGNOSIS — M54.6 ACUTE MIDLINE THORACIC BACK PAIN: ICD-10-CM

## 2019-08-14 DIAGNOSIS — G89.29 CHRONIC RIGHT SHOULDER PAIN: ICD-10-CM

## 2019-08-14 PROCEDURE — 99204 OFFICE O/P NEW MOD 45 MIN: CPT | Performed by: PHYSICIAN ASSISTANT

## 2019-08-14 RX ORDER — PREDNISONE 10 MG/1
TABLET ORAL
Qty: 21 TABLET | Refills: 0 | Status: SHIPPED | OUTPATIENT
Start: 2019-08-14 | End: 2020-03-09 | Stop reason: ALTCHOICE

## 2019-08-14 RX ORDER — METHOCARBAMOL 500 MG/1
500 TABLET, FILM COATED ORAL 3 TIMES DAILY
Qty: 30 TABLET | Refills: 1 | Status: SHIPPED | OUTPATIENT
Start: 2019-08-14 | End: 2020-03-09 | Stop reason: ALTCHOICE

## 2019-08-14 NOTE — PATIENT INSTRUCTIONS
1  XR cervical and lumbar spine, will call with abnormal results  2  Start physical therapy when able  3  Follow up with PCP to discuss restarting gabapentin or other membrane stabilizer medication for chronic pain

## 2019-09-01 ENCOUNTER — APPOINTMENT (OUTPATIENT)
Dept: RADIOLOGY | Facility: MEDICAL CENTER | Age: 59
End: 2019-09-01
Payer: MEDICARE

## 2019-09-01 DIAGNOSIS — M62.838 TRAPEZIUS MUSCLE SPASM: ICD-10-CM

## 2019-09-01 DIAGNOSIS — M54.6 CHRONIC RIGHT-SIDED THORACIC BACK PAIN: ICD-10-CM

## 2019-09-01 DIAGNOSIS — G89.29 CHRONIC RIGHT-SIDED THORACIC BACK PAIN: ICD-10-CM

## 2019-09-01 DIAGNOSIS — M54.2 NECK PAIN: ICD-10-CM

## 2019-09-01 PROCEDURE — 72072 X-RAY EXAM THORAC SPINE 3VWS: CPT

## 2019-09-01 PROCEDURE — 72040 X-RAY EXAM NECK SPINE 2-3 VW: CPT

## 2019-09-04 ENCOUNTER — TELEPHONE (OUTPATIENT)
Dept: PAIN MEDICINE | Facility: MEDICAL CENTER | Age: 59
End: 2019-09-04

## 2019-09-04 NOTE — TELEPHONE ENCOUNTER
Pt called to get xray results    Pt advise that the results are not final in chart and will received a call when they result are in       Pt can be reached at [phone]

## 2020-02-25 ENCOUNTER — APPOINTMENT (EMERGENCY)
Dept: CT IMAGING | Facility: HOSPITAL | Age: 60
End: 2020-02-25
Payer: MEDICARE

## 2020-02-25 ENCOUNTER — HOSPITAL ENCOUNTER (EMERGENCY)
Facility: HOSPITAL | Age: 60
Discharge: HOME | End: 2020-02-25
Attending: EMERGENCY MEDICINE | Admitting: EMERGENCY MEDICINE
Payer: MEDICARE

## 2020-02-25 ENCOUNTER — OFFICE VISIT (OUTPATIENT)
Dept: URGENT CARE | Facility: MEDICAL CENTER | Age: 60
End: 2020-02-25
Payer: MEDICARE

## 2020-02-25 VITALS
RESPIRATION RATE: 18 BRPM | DIASTOLIC BLOOD PRESSURE: 58 MMHG | SYSTOLIC BLOOD PRESSURE: 148 MMHG | OXYGEN SATURATION: 98 % | WEIGHT: 293 LBS | HEIGHT: 64 IN | BODY MASS INDEX: 50.02 KG/M2 | TEMPERATURE: 98.9 F | HEART RATE: 78 BPM

## 2020-02-25 VITALS
DIASTOLIC BLOOD PRESSURE: 98 MMHG | WEIGHT: 293 LBS | HEART RATE: 119 BPM | BODY MASS INDEX: 44.41 KG/M2 | HEIGHT: 68 IN | TEMPERATURE: 97.8 F | RESPIRATION RATE: 19 BRPM | OXYGEN SATURATION: 96 % | SYSTOLIC BLOOD PRESSURE: 145 MMHG

## 2020-02-25 DIAGNOSIS — R10.11 RUQ ABDOMINAL PAIN: Primary | ICD-10-CM

## 2020-02-25 DIAGNOSIS — R10.11 RIGHT UPPER QUADRANT ABDOMINAL PAIN: Primary | ICD-10-CM

## 2020-02-25 LAB
ALBUMIN SERPL BCP-MCNC: 3.6 G/DL (ref 3.5–5)
ALP SERPL-CCNC: 112 U/L (ref 46–116)
ALT SERPL W P-5'-P-CCNC: 30 U/L (ref 12–78)
ANION GAP SERPL CALCULATED.3IONS-SCNC: 8 MMOL/L (ref 4–13)
AST SERPL W P-5'-P-CCNC: 45 U/L (ref 5–45)
BASOPHILS # BLD AUTO: 0.07 THOUSANDS/ΜL (ref 0–0.1)
BASOPHILS NFR BLD AUTO: 1 % (ref 0–1)
BILIRUB SERPL-MCNC: 0.4 MG/DL (ref 0.2–1)
BUN SERPL-MCNC: 14 MG/DL (ref 5–25)
CALCIUM SERPL-MCNC: 9.1 MG/DL (ref 8.3–10.1)
CHLORIDE SERPL-SCNC: 100 MMOL/L (ref 100–108)
CO2 SERPL-SCNC: 30 MMOL/L (ref 21–32)
CREAT SERPL-MCNC: 0.77 MG/DL (ref 0.6–1.3)
EOSINOPHIL # BLD AUTO: 0.51 THOUSAND/ΜL (ref 0–0.61)
EOSINOPHIL NFR BLD AUTO: 4 % (ref 0–6)
ERYTHROCYTE [DISTWIDTH] IN BLOOD BY AUTOMATED COUNT: 14.1 % (ref 11.6–15.1)
GFR SERPL CREATININE-BSD FRML MDRD: 86 ML/MIN/1.73SQ M
GLUCOSE SERPL-MCNC: 96 MG/DL (ref 65–140)
HCT VFR BLD AUTO: 45.7 % (ref 34.8–46.1)
HGB BLD-MCNC: 14.5 G/DL (ref 11.5–15.4)
IMM GRANULOCYTES # BLD AUTO: 0.06 THOUSAND/UL (ref 0–0.2)
IMM GRANULOCYTES NFR BLD AUTO: 1 % (ref 0–2)
LIPASE SERPL-CCNC: 107 U/L (ref 73–393)
LYMPHOCYTES # BLD AUTO: 2.46 THOUSANDS/ΜL (ref 0.6–4.47)
LYMPHOCYTES NFR BLD AUTO: 20 % (ref 14–44)
MCH RBC QN AUTO: 28.5 PG (ref 26.8–34.3)
MCHC RBC AUTO-ENTMCNC: 31.7 G/DL (ref 31.4–37.4)
MCV RBC AUTO: 90 FL (ref 82–98)
MONOCYTES # BLD AUTO: 0.9 THOUSAND/ΜL (ref 0.17–1.22)
MONOCYTES NFR BLD AUTO: 7 % (ref 4–12)
NEUTROPHILS # BLD AUTO: 8.35 THOUSANDS/ΜL (ref 1.85–7.62)
NEUTS SEG NFR BLD AUTO: 67 % (ref 43–75)
NRBC BLD AUTO-RTO: 0 /100 WBCS
PLATELET # BLD AUTO: 367 THOUSANDS/UL (ref 149–390)
PMV BLD AUTO: 10.2 FL (ref 8.9–12.7)
POTASSIUM SERPL-SCNC: 4.3 MMOL/L (ref 3.5–5.3)
PROT SERPL-MCNC: 8.7 G/DL (ref 6.4–8.2)
RBC # BLD AUTO: 5.09 MILLION/UL (ref 3.81–5.12)
SODIUM SERPL-SCNC: 138 MMOL/L (ref 136–145)
WBC # BLD AUTO: 12.35 THOUSAND/UL (ref 4.31–10.16)

## 2020-02-25 PROCEDURE — G0463 HOSPITAL OUTPT CLINIC VISIT: HCPCS | Performed by: NURSE PRACTITIONER

## 2020-02-25 PROCEDURE — 85025 COMPLETE CBC W/AUTO DIFF WBC: CPT | Performed by: EMERGENCY MEDICINE

## 2020-02-25 PROCEDURE — 74177 CT ABD & PELVIS W/CONTRAST: CPT

## 2020-02-25 PROCEDURE — 36415 COLL VENOUS BLD VENIPUNCTURE: CPT

## 2020-02-25 PROCEDURE — 99213 OFFICE O/P EST LOW 20 MIN: CPT | Performed by: NURSE PRACTITIONER

## 2020-02-25 PROCEDURE — 99284 EMERGENCY DEPT VISIT MOD MDM: CPT

## 2020-02-25 PROCEDURE — 83690 ASSAY OF LIPASE: CPT | Performed by: EMERGENCY MEDICINE

## 2020-02-25 PROCEDURE — 80053 COMPREHEN METABOLIC PANEL: CPT | Performed by: EMERGENCY MEDICINE

## 2020-02-25 PROCEDURE — 99284 EMERGENCY DEPT VISIT MOD MDM: CPT | Performed by: PHYSICIAN ASSISTANT

## 2020-02-25 RX ADMIN — IOHEXOL 100 ML: 350 INJECTION, SOLUTION INTRAVENOUS at 16:02

## 2020-02-25 NOTE — ED PROVIDER NOTES
History  Chief Complaint   Patient presents with    Abdominal Pain     Pt reports upper quad abd pain ongoing for the past few weeks, now worsening  Went to care now, sent her for futher eval +N/V     61-year-old female with history of GERD, fatty liver, and hypertension presenting for evaluation of upper abdominal pain for the past several weeks  The pain began worsening yesterday and she was seen at urgent care prior to arrival   There was concern for gallbladder pathology and she was referred to the ER for further evaluation  The pain does not seem to be related to eating  The pain in non-radiating and she currently rates it as an 8/10 in severity  Patient also complains of nausea and will have about 1 episode of vomiting daily  Patient is otherwise asymptomatic  She denies fevers, chills, diarrhea, constipation, dysuria, urinary frequency, vaginal bleeding, vaginal discharge, chest pain, and shortness of breath  Previous abdominal surgeries include a tubal ligation  Patient had an EGD about a year ago which was reportedly normal       History provided by:  Patient and medical records   used: No    Abdominal Pain   Pain location:  RUQ  Pain quality: sharp    Pain radiates to:  Does not radiate  Pain severity:  Moderate  Onset quality:  Gradual  Timing:  Intermittent  Progression:  Worsening  Chronicity:  New  Context: not sick contacts, not suspicious food intake and not trauma    Relieved by:  Nothing  Worsened by:  Nothing  Ineffective treatments:  None tried  Associated symptoms: anorexia, nausea and vomiting    Associated symptoms: no belching, no chest pain, no chills, no constipation, no cough, no diarrhea, no dysuria, no fatigue, no fever, no flatus, no hematemesis, no shortness of breath, no sore throat, no vaginal bleeding and no vaginal discharge    Risk factors: obesity        Prior to Admission Medications   Prescriptions Last Dose Informant Patient Reported? Taking? Omeprazole 20 MG TBDD  Self Yes No   Sig: Take 20 mg by mouth daily   benazepril-hydrochlorthiazide (LOTENSIN HCT) 10-12 5 MG per tablet  Self Yes No   Sig: TAKE 1 TABLET EVERY DAY   methocarbamol (ROBAXIN) 500 mg tablet   No No   Sig: Take 1 tablet (500 mg total) by mouth 3 (three) times a day   Patient not taking: Reported on 2/25/2020   predniSONE 10 mg tablet   No No   Sig: Take 6 tablets by mouth on day 1, 5 tablets day 2, 4 tablets day 3, 3 tablets day 4, 2 tablets day 5, and 1 tablet on day 6  Patient not taking: Reported on 2/25/2020      Facility-Administered Medications: None       Past Medical History:   Diagnosis Date    Fatty liver     GERD (gastroesophageal reflux disease)     Hypertension        Past Surgical History:   Procedure Laterality Date    BACK SURGERY      FINGER SURGERY      SINUS SURGERY      TUBAL LIGATION         Family History   Problem Relation Age of Onset    Heart disease Mother      I have reviewed and agree with the history as documented  E-Cigarette/Vaping    E-Cigarette Use Never User      E-Cigarette/Vaping Substances     Social History     Tobacco Use    Smoking status: Never Smoker    Smokeless tobacco: Never Used   Substance Use Topics    Alcohol use: Never     Frequency: Never    Drug use: Never       Review of Systems   Constitutional: Negative for chills, fatigue and fever  HENT: Negative for drooling and sore throat  Eyes: Negative for discharge and redness  Respiratory: Negative for cough, shortness of breath, wheezing and stridor  Cardiovascular: Negative for chest pain  Gastrointestinal: Positive for abdominal pain, anorexia, nausea and vomiting  Negative for constipation, diarrhea, flatus and hematemesis  Genitourinary: Negative for difficulty urinating, dysuria, flank pain, vaginal bleeding and vaginal discharge  Musculoskeletal: Negative for back pain, neck pain and neck stiffness     Skin: Negative for color change, rash and wound  Allergic/Immunologic: Negative for immunocompromised state  Neurological: Negative for syncope and weakness  Psychiatric/Behavioral: Negative for confusion  All other systems reviewed and are negative  Physical Exam  Physical Exam   Constitutional: She appears well-developed and well-nourished  No distress  Non-toxic appearing   HENT:   Head: Normocephalic and atraumatic  Right Ear: External ear normal    Left Ear: External ear normal    Nose: Nose normal    Mouth/Throat: Oropharynx is clear and moist    Eyes: Conjunctivae are normal  Right eye exhibits no discharge  Left eye exhibits no discharge  No scleral icterus  Neck: Normal range of motion  Neck supple  Cardiovascular: Normal rate, regular rhythm and normal heart sounds  No murmur heard  Pulmonary/Chest: Effort normal and breath sounds normal  No stridor  No respiratory distress  She has no wheezes  She has no rales  Abdominal: Soft  Bowel sounds are normal  She exhibits no distension  There is tenderness in the right upper quadrant  There is no rigidity, no rebound, no guarding and no CVA tenderness  Abdomen obese  RUQ tenderness present  No signs of peritonitis    Musculoskeletal: Normal range of motion  She exhibits no edema or deformity  Lymphadenopathy:     She has no cervical adenopathy  Neurological: She is alert  She is not disoriented  GCS eye subscore is 4  GCS verbal subscore is 5  GCS motor subscore is 6  Skin: Skin is warm and dry  She is not diaphoretic  Psychiatric: She has a normal mood and affect  Her behavior is normal    Nursing note and vitals reviewed        Vital Signs  ED Triage Vitals [02/25/20 1337]   Temperature Pulse Respirations Blood Pressure SpO2   98 9 °F (37 2 °C) 98 18 152/69 98 %      Temp Source Heart Rate Source Patient Position - Orthostatic VS BP Location FiO2 (%)   Oral Monitor Sitting Left arm --      Pain Score       8           Vitals:    02/25/20 1337 02/25/20 1705   BP: 152/69 148/58   Pulse: 98 78   Patient Position - Orthostatic VS: Sitting          Visual Acuity      ED Medications  Medications   iohexol (OMNIPAQUE) 350 MG/ML injection (MULTI-DOSE) 100 mL (100 mL Intravenous Given 2/25/20 1602)       Diagnostic Studies  Results Reviewed     Procedure Component Value Units Date/Time    Comprehensive metabolic panel [[id number]]  (Abnormal) Collected:  02/25/20 1348    Lab Status:  Final result Specimen:  Blood from Arm, Right Updated:  02/25/20 1412     Sodium 138 mmol/L      Potassium 4 3 mmol/L      Chloride 100 mmol/L      CO2 30 mmol/L      ANION GAP 8 mmol/L      BUN 14 mg/dL      Creatinine 0 77 mg/dL      Glucose 96 mg/dL      Calcium 9 1 mg/dL      AST 45 U/L      ALT 30 U/L      Alkaline Phosphatase 112 U/L      Total Protein 8 7 g/dL      Albumin 3 6 g/dL      Total Bilirubin 0 40 mg/dL      eGFR 86 ml/min/1 73sq m     Narrative:       [place] guidelines for Chronic Kidney Disease (CKD):     Stage 1 with normal or high GFR (GFR > 90 mL/min/1 73 square meters)    Stage 2 Mild CKD (GFR = 60-89 mL/min/1 73 square meters)    Stage 3A Moderate CKD (GFR = 45-59 mL/min/1 73 square meters)    Stage 3B Moderate CKD (GFR = 30-44 mL/min/1 73 square meters)    Stage 4 Severe CKD (GFR = 15-29 mL/min/1 73 square meters)    Stage 5 End Stage CKD (GFR <15 mL/min/1 73 square meters)  Note: GFR calculation is accurate only with a steady state creatinine    Lipase [[id number]]  (Normal) Collected:  02/25/20 1348    Lab Status:  Final result Specimen:  Blood from Arm, Right Updated:  02/25/20 1412     Lipase 107 u/L     CBC and differential [[id number]]  (Abnormal) Collected:  02/25/20 1348    Lab Status:  Final result Specimen:  Blood from Arm, Right Updated:  02/25/20 1353     WBC 12 35 Thousand/uL      RBC 5 09 Million/uL      Hemoglobin 14 5 g/dL      Hematocrit 45 7 %      MCV 90 fL      MCH 28 5 pg      MCHC 31 7 g/dL      RDW 14 1 %      MPV 10 2 fL Platelets 867 Thousands/uL      nRBC 0 /100 WBCs      Neutrophils Relative 67 %      Immat GRANS % 1 %      Lymphocytes Relative 20 %      Monocytes Relative 7 %      Eosinophils Relative 4 %      Basophils Relative 1 %      Neutrophils Absolute 8 35 Thousands/µL      Immature Grans Absolute 0 06 Thousand/uL      Lymphocytes Absolute 2 46 Thousands/µL      Monocytes Absolute 0 90 Thousand/µL      Eosinophils Absolute 0 51 Thousand/µL      Basophils Absolute 0 07 Thousands/µL                  CT abdomen pelvis with contrast   Final Result by Karri Carlson MD (02/25 1640)         1  Hepatic steatosis and hepatomegaly  2  Colonic diverticulosis  Workstation performed: [id number]                    Procedures  Procedures         ED Course                   MDM  Number of Diagnoses or Management Options  Right upper quadrant abdominal pain: new and requires workup  Diagnosis management comments: 60yo female presenting for upper abdominal pain x several weeks that is now worsening  Sent from urgent care for concern for cholecystitis  Pain is not classic for biliary colic as it is unrelated to eating  No fevers or chills  No chest pain or shortness of breath  Vitals unremarkable  No signs of peritonitis on exam  Differential diagnosis includes but is not limited to: gastritis, GERD, pancreatitis, hepatitis, cholecystitis, cholelithiasis, colitis, diverticulitis, appendicitis, mesenteric adenitis, UTI, pyelonephritis, SBO, constipation, kidney stone, musculoskeletal, nonspecific abdominal pain  Initial ED plan: Check abdominal labs, UA, and CT abdomen for further evaluation  Patient declines analgesics at this time  Final assessment: Labs significant for a leukocytosis with white count of 12  Patient reports a history of chronic leukocytosis  Remainder of labs unremarkable including normal electrolytes, renal function, LFTs, and lipase  CT reveals evidence of hepatomegaly and fatty liver   No other acute abnormalities seen in abdomen and gallbladder appears normal  Unclear etiology of symptoms, may be related to hepatomegaly vs muscular vs PUD vs other  No indications for admission at this time  Advised close PCP and GI follow-up  ED return precautions discussed  Patient expressed understanding and is agreeable to plan  Patient discharged in stable condition  Amount and/or Complexity of Data Reviewed  Clinical lab tests: ordered and reviewed  Tests in the radiology section of CPT®: ordered and reviewed  Review and summarize past medical records: yes  Independent visualization of images, tracings, or specimens: yes    Risk of Complications, Morbidity, and/or Mortality  Presenting problems: moderate  Diagnostic procedures: moderate  Management options: moderate    Patient Progress  Patient progress: stable        Disposition  Final diagnoses:   Right upper quadrant abdominal pain     Time reflects when diagnosis was documented in both MDM as applicable and the Disposition within this note     Time User Action Codes Description Comment    2/25/2020  5:00 PM [address] [place], [place] [address]y, [place] [R10 11] Right upper quadrant abdominal pain       ED Disposition     ED Disposition Condition Date/Time Comment    Discharge Stable Tue Feb 25, 2020  5:00 PM Tod Servin discharge to home/self care              Follow-up Information     Follow up With Specialties Details Why Contact Info Additional [facility] [place] [facility] Schedule an appointment as soon as possible for a visit in 1 week  [address] [phone]  [address] [facility] [place], [facility] [address] [facility], [facility], [place], [place], [address] [address] [address] Medicine Schedule an appointment as soon as possible for a visit   [address] [facility] [address] [phone]         [facility] Emergency Department Emergency Medicine  If symptoms worsen [address] [facility] [id number]  [address] [place] [address] ED, [address] [facility], [facility], [address] [facility], [address]          Discharge Medication List as of 2/25/2020  5:02 PM      CONTINUE these medications which have NOT CHANGED    Details   benazepril-hydrochlorthiazide (LOTENSIN HCT) 10-12 5 MG per tablet TAKE 1 TABLET EVERY DAY, Historical Med      methocarbamol (ROBAXIN) 500 mg tablet Take 1 tablet (500 mg total) by mouth 3 (three) times a day, Starting Wed 8/14/2019, Normal      Omeprazole 20 MG TBDD Take 20 mg by mouth daily, Starting Fri 5/30/2014, Historical Med      predniSONE 10 mg tablet Take 6 tablets by mouth on day 1, 5 tablets day 2, 4 tablets day 3, 3 tablets day 4, 2 tablets day 5, and 1 tablet on day 6 , Normal           No discharge procedures on file      PDMP Review     None          ED Provider  Electronically Signed by           Vicente Gutierrez PA-C  02/26/20 1531

## 2020-02-25 NOTE — PROGRESS NOTES
[facility]        NAME: Maureen Bender is a 64 y o  female  : 1960    MRN: [id number]  DATE: 2020  TIME: 11:58 AM    Assessment and Plan   RUQ abdominal pain [R10 11]  1  RUQ abdominal pain  Transfer to other facility         Patient Instructions   Referred to the emergency department for further evaluation and testing  She states that she lives closest to [place] and will be going there  Follow up with PCP in 3-5 days  Proceed to  ER if symptoms worsen  Chief Complaint     Chief Complaint   Patient presents with    Abdominal Pain     x2 weeks with constant abd pain that worsened last night  Feels pressure and a lump   Fever     Highest 102 0F    Chills    Vomiting    Diarrhea    Nausea         History of Present Illness       Patient is a 59-year-old female presenting with a few week history of epigastric and right upper quadrant pain  She reports it is worse over the past 2 days  When she is lying flat she feels like her throat is closing  She has had intermittent fevers over the past several weeks  Max temperature 102°  She feels overall fatigue  Associated with nausea and vomiting  Intermittent diarrhea  Denies blood or mucus in the bladder vomit  No history of abdominal surgeries other than a tubal ligation  She does take omeprazole 20 mg twice a day, which she has been taking for years  She is a nonsmoker  Denies alcohol use  Denies drug abuse  Review of Systems   Review of Systems   Constitutional: Positive for chills, fatigue and fever  Negative for activity change  HENT: Negative for congestion, ear pain, facial swelling, sore throat and trouble swallowing  Respiratory: Negative for cough and shortness of breath  Gastrointestinal: Positive for abdominal pain, diarrhea, nausea and vomiting  Negative for blood in stool  Neurological: Negative for headaches           Current Medications       Current Outpatient Medications:    benazepril-hydrochlorthiazide (LOTENSIN HCT) 10-12 5 MG per tablet, TAKE 1 TABLET EVERY DAY, Disp: , Rfl:     Omeprazole 20 MG TBDD, Take 20 mg by mouth daily, Disp: , Rfl:     methocarbamol (ROBAXIN) 500 mg tablet, Take 1 tablet (500 mg total) by mouth 3 (three) times a day (Patient not taking: Reported on 2/25/2020), Disp: 30 tablet, Rfl: 1    predniSONE 10 mg tablet, Take 6 tablets by mouth on day 1, 5 tablets day 2, 4 tablets day 3, 3 tablets day 4, 2 tablets day 5, and 1 tablet on day 6  (Patient not taking: Reported on 2/25/2020), Disp: 21 tablet, Rfl: 0    Current Allergies     Allergies as of 02/25/2020 - Reviewed 02/25/2020   Allergen Reaction Noted    Penicillins  06/19/2014            The following portions of the patient's history were reviewed and updated as appropriate: allergies, current medications, past family history, past medical history, past social history, past surgical history and problem list      Past Medical History:   Diagnosis Date    Fatty liver     GERD (gastroesophageal reflux disease)     Hypertension        Past Surgical History:   Procedure Laterality Date    BACK SURGERY      FINGER SURGERY      SINUS SURGERY      TUBAL LIGATION         Family History   Problem Relation Age of Onset    Heart disease Mother          Medications have been verified  Objective   /98   Pulse (!) 119   Temp 97 8 °F (36 6 °C) (Temporal)   Resp 19   Ht 5' 8" (1 727 m)   Wt 136 kg (300 lb)   SpO2 96%   BMI 45 61 kg/m²        Physical Exam     Physical Exam   Constitutional: She is oriented to person, place, and time  She appears well-developed and well-nourished  She is active  She does not appear ill  HENT:   Head: Normocephalic  Right Ear: Hearing normal    Left Ear: Hearing normal    Nose: Nose normal    Cardiovascular: Regular rhythm, S1 normal, S2 normal and normal heart sounds  Tachycardia present     Pulmonary/Chest: Effort normal and breath sounds normal  No respiratory distress  She has no decreased breath sounds  She has no wheezes  She has no rhonchi  She has no rales  Abdominal: Soft  Normal appearance and bowel sounds are normal  There is tenderness in the right upper quadrant and epigastric area  Neurological: She is alert and oriented to person, place, and time  She is not disoriented

## 2020-02-25 NOTE — DISCHARGE INSTRUCTIONS
Continue taking your omeprazole  Follow-up with your GI and family doctor  Return to ER with worsening symptoms

## 2020-03-09 ENCOUNTER — TELEPHONE (OUTPATIENT)
Dept: ADMINISTRATIVE | Facility: OTHER | Age: 60
End: 2020-03-09

## 2020-03-09 ENCOUNTER — OFFICE VISIT (OUTPATIENT)
Dept: FAMILY MEDICINE CLINIC | Facility: MEDICAL CENTER | Age: 60
End: 2020-03-09
Payer: MEDICARE

## 2020-03-09 ENCOUNTER — TELEPHONE (OUTPATIENT)
Dept: FAMILY MEDICINE CLINIC | Facility: MEDICAL CENTER | Age: 60
End: 2020-03-09

## 2020-03-09 VITALS
SYSTOLIC BLOOD PRESSURE: 130 MMHG | BODY MASS INDEX: 47.09 KG/M2 | HEART RATE: 110 BPM | DIASTOLIC BLOOD PRESSURE: 96 MMHG | HEIGHT: 66 IN | WEIGHT: 293 LBS

## 2020-03-09 DIAGNOSIS — R73.9 HYPERGLYCEMIA: ICD-10-CM

## 2020-03-09 DIAGNOSIS — Z12.31 VISIT FOR SCREENING MAMMOGRAM: ICD-10-CM

## 2020-03-09 DIAGNOSIS — Z12.4 SCREENING FOR CERVICAL CANCER: ICD-10-CM

## 2020-03-09 DIAGNOSIS — R53.83 FATIGUE, UNSPECIFIED TYPE: ICD-10-CM

## 2020-03-09 DIAGNOSIS — R51.9 NONINTRACTABLE HEADACHE, UNSPECIFIED CHRONICITY PATTERN, UNSPECIFIED HEADACHE TYPE: ICD-10-CM

## 2020-03-09 DIAGNOSIS — I10 ESSENTIAL HYPERTENSION: Primary | ICD-10-CM

## 2020-03-09 DIAGNOSIS — E66.01 MORBID OBESITY WITH BMI OF 40.0-44.9, ADULT (HCC): ICD-10-CM

## 2020-03-09 DIAGNOSIS — K21.9 GASTROESOPHAGEAL REFLUX DISEASE, ESOPHAGITIS PRESENCE NOT SPECIFIED: ICD-10-CM

## 2020-03-09 PROCEDURE — 1036F TOBACCO NON-USER: CPT | Performed by: FAMILY MEDICINE

## 2020-03-09 PROCEDURE — 99202 OFFICE O/P NEW SF 15 MIN: CPT | Performed by: FAMILY MEDICINE

## 2020-03-09 PROCEDURE — 3080F DIAST BP >= 90 MM HG: CPT | Performed by: FAMILY MEDICINE

## 2020-03-09 PROCEDURE — 3008F BODY MASS INDEX DOCD: CPT | Performed by: FAMILY MEDICINE

## 2020-03-09 PROCEDURE — 3075F SYST BP GE 130 - 139MM HG: CPT | Performed by: FAMILY MEDICINE

## 2020-03-09 RX ORDER — BENAZEPRIL/HYDROCHLOROTHIAZIDE 20 MG-25MG
1 TABLET ORAL DAILY
Qty: 30 TABLET | Refills: 1 | Status: SHIPPED | OUTPATIENT
Start: 2020-03-09 | End: 2020-04-07 | Stop reason: SDUPTHER

## 2020-03-09 NOTE — TELEPHONE ENCOUNTER
Upon review of the In Basket request and the patient's chart, initial outreach has been made via fax, please see Contacts section for details  A second outreach attempt will be made within 3 business days      Thank you  Ada Kellogg

## 2020-03-09 NOTE — PROGRESS NOTES
Assessment/Plan:       Diagnoses and all orders for this visit:    Essential hypertension  -     Comprehensive metabolic panel; Future  -     Lipid Panel with Direct LDL reflex; Future  -     T4, free; Future  -     TSH, 3rd generation; Future  Blood pressure did improve on repeat measurement but is still high normal   Patient not sure of her exact dosing for benazepril although the records shows she is on benazepril and hydrochlorothiazide in combination  Patient will call back with the exact dosing  Medication will be adjusted at that time as elevated blood pressure during the day are likely causing some of her symptoms  Check some labs  (addendum:  Patient did call back  She is on benazepril with hydrochlorothiazide 10-12 5 mg daily  That will be doubled )    Gastroesophageal reflux disease, esophagitis presence not specified  Abdominal pain likely secondary to GERD  Patient encouraged to follow-up with GI  Fatigue, unspecified type  -     Lyme Antibody Profile with reflex to WB; Future  Fatigue likely related to obesity  Check some additional labs for evaluation  Nonintractable headache, unspecified chronicity pattern, unspecified headache type  Likely related to uncontrolled hypertension  Adjust medication dose once patient confirms what medication she is taking  Hyperglycemia  -     Hemoglobin A1C; Future  Previous hyperglycemia  Check A1c to assess for diabetes  Morbid obesity with BMI of 40 0-44 9, adult ([facility] [address] )  Morbid obesity is likely the cause of patient's symptoms  She was highly encouraged to lower her caloric intake and exercise regularly  BMI Counseling: Body mass index is 49 23 kg/m²  The BMI is above normal  Nutrition recommendations include decreasing overall calorie intake  Exercise recommendations include moderate aerobic physical activity for 150 minutes/week      Visit for screening mammogram  -     Mammo screening bilateral w 3d & cad; Future  Order will be provided for a mammogram     Screening for cervical cancer  -     Ambulatory referral to Gynecology; Future  Patient encouraged to schedule point with Gynecology down the hallway  Follow-up in one month or sooner if needed  Subjective:      Patient ID: Ellen Molina is a 64 y o  female  Patient presents to establish care  Previous PCP no longer takes her insurance  She was seen in the ER recently for abdominal pain  Continues to have abdominal pain  Has known GERD  Was told she has a fatty liver  Has also been getting headaches  Has a white coating on her tongue  And also complains of fatigue  She does have hypertension  She believes she is on benazepril but does not know the dose  Does not eat healthy  Does not exercise regularly  Her GERD is treated by [facility]  Has not had a mammogram recently  Has not been to the gynecologist recently  The following portions of the patient's history were reviewed and updated as appropriate: She  has a past medical history of Fatty liver, GERD (gastroesophageal reflux disease), and Hypertension  She   Patient Active Problem List    Diagnosis Date Noted    HTN (hypertension) 03/09/2020    GERD (gastroesophageal reflux disease) 03/09/2020    Morbid obesity with BMI of 40 0-44 9, adult ([facility] [address] ) 03/09/2020    Cervical spondylosis 06/19/2014     She  has a past surgical history that includes Back surgery; Finger surgery; Tubal ligation; and Sinus surgery  Her family history includes Atrial fibrillation in her mother; Coronary artery disease in her father; Diabetes in her father; Heart disease in her mother; Thyroid disease in her mother  She  reports that she has never smoked  She has never used smokeless tobacco  She reports that she does not drink alcohol or use drugs    Current Outpatient Medications   Medication Sig Dispense Refill    Omeprazole 20 MG TBDD Take 20 mg by mouth daily      benazepril-hydrochlorthiazide (LOTENSIN HCT) 20-25 MG per tablet Take 1 tablet by mouth daily 30 tablet 1     No current facility-administered medications for this visit  Current Outpatient Medications on File Prior to Visit   Medication Sig    Omeprazole 20 MG TBDD Take 20 mg by mouth daily    [DISCONTINUED] benazepril-hydrochlorthiazide (LOTENSIN HCT) 10-12 5 MG per tablet TAKE 1 TABLET EVERY DAY    [DISCONTINUED] methocarbamol (ROBAXIN) 500 mg tablet Take 1 tablet (500 mg total) by mouth 3 (three) times a day (Patient not taking: Reported on 2/25/2020)    [DISCONTINUED] predniSONE 10 mg tablet Take 6 tablets by mouth on day 1, 5 tablets day 2, 4 tablets day 3, 3 tablets day 4, 2 tablets day 5, and 1 tablet on day 6  (Patient not taking: Reported on 2/25/2020)     No current facility-administered medications on file prior to visit  She is allergic to penicillins       Review of Systems   Constitutional: Positive for fatigue  Negative for fever  Respiratory: Negative for shortness of breath  Cardiovascular: Negative for chest pain  Gastrointestinal: Positive for abdominal pain  Negative for blood in stool  Genitourinary: Negative for dysuria and hematuria  Musculoskeletal: Negative for arthralgias and myalgias  Skin: Negative for rash  Neurological: Positive for headaches  Objective:      /96 (BP Location: Left arm, Patient Position: Sitting, Cuff Size: Large)   Pulse (!) 110   Ht 5' 6" (1 676 m)   Wt (!) 138 kg (305 lb)   BMI 49 23 kg/m²          Physical Exam   Constitutional: She is oriented to person, place, and time  Vital signs are normal  She appears well-developed and well-nourished  HENT:   Head: Normocephalic and atraumatic     Right Ear: Tympanic membrane, external ear and ear canal normal    Left Ear: Tympanic membrane, external ear and ear canal normal    Nose: Nose normal    Mouth/Throat: Uvula is midline, oropharynx is clear and moist and mucous membranes are normal    Eyes: Pupils are equal, round, and reactive to light  Conjunctivae, EOM and lids are normal    Neck: Trachea normal  Neck supple  No thyromegaly present  Cardiovascular: Normal rate, regular rhythm, S1 normal and S2 normal    No murmur heard  Pulmonary/Chest: Effort normal and breath sounds normal    Abdominal: Soft  Bowel sounds are normal  There is no tenderness  Lymphadenopathy:     She has no cervical adenopathy  Neurological: She is alert and oriented to person, place, and time  No cranial nerve deficit  Skin: Skin is warm, dry and intact  Psychiatric: She has a normal mood and affect   Her speech is normal and behavior is normal  Thought content normal

## 2020-03-09 NOTE — TELEPHONE ENCOUNTER
----- Message from Jax Bailey, [address] [place] sent at 3/9/2020 11:13 AM EDT -----  Regarding: colonoscopy [facility]  03/09/20 11:13 AM    Hello, our patient Antonella Torres has had CRC: Colonoscopy completed/performed  Please assist in updating the patient chart by making an External outreach to [address] [facility] located in [place]   The date of service is uncertain     Thank you,  Jax Bailey, MA  [facility]

## 2020-03-09 NOTE — TELEPHONE ENCOUNTER
Upon review of the In Basket request we were able to locate, review, and update the patient chart as requested for CRC: Colonoscopy  Any additional questions or concerns should be emailed to the Practice Liaisons via [email or web address] email, please do not reply via In Basket      Thank you  Chely De Los Santos

## 2020-03-09 NOTE — TELEPHONE ENCOUNTER
I am going to increase patient's dose of both benazepril and hydrochlorothiazide and faxed in a new prescription for benazepril-hydrochlorothiazide 20-25 mg daily

## 2020-03-09 NOTE — LETTER
Procedure Request Form: Colonoscopy      Date Requested: 20  Patient: Bernett [place]  Patient : 1960   Referring Provider: Kristina Luz Maria, DO        Date of Procedure ______________________________       The above patient has informed us that they have completed their   most recent Colonoscopy at your facility  Please complete   this form and attach all corresponding procedure reports/results  Comments __________________________________________________________  ____________________________________________________________________  ____________________________________________________________________  ____________________________________________________________________    Facility Completing Procedure _________________________________________    Form Completed By (print name) _______________________________________      Signature __________________________________________________________      These reports are needed for  compliance    Please fax this completed form and a copy of the procedure report to our office located at [place] [address] as soon as possible to [phone] omar Delaney: Phone [phone]    We thank you for your assistance in treating our mutual patient

## 2020-03-09 NOTE — TELEPHONE ENCOUNTER
Please send script  Benazepril/HCTZ 10/12  5-patient will be having blood work on Thursday and will  the mammo and GYN referral then  Filed up front

## 2020-04-03 DIAGNOSIS — I10 ESSENTIAL HYPERTENSION: ICD-10-CM

## 2020-04-06 ENCOUNTER — TELEPHONE (OUTPATIENT)
Dept: FAMILY MEDICINE CLINIC | Facility: MEDICAL CENTER | Age: 60
End: 2020-04-06

## 2020-04-07 ENCOUNTER — TELEMEDICINE (OUTPATIENT)
Dept: FAMILY MEDICINE CLINIC | Facility: MEDICAL CENTER | Age: 60
End: 2020-04-07
Payer: MEDICARE

## 2020-04-07 DIAGNOSIS — I10 ESSENTIAL HYPERTENSION: ICD-10-CM

## 2020-04-07 PROCEDURE — 99213 OFFICE O/P EST LOW 20 MIN: CPT | Performed by: FAMILY MEDICINE

## 2020-04-07 RX ORDER — BENAZEPRIL/HYDROCHLOROTHIAZIDE 20 MG-25MG
TABLET ORAL
Qty: 30 TABLET | Refills: 1 | OUTPATIENT
Start: 2020-04-07

## 2020-04-07 RX ORDER — BENAZEPRIL/HYDROCHLOROTHIAZIDE 20 MG-25MG
1 TABLET ORAL DAILY
Qty: 30 TABLET | Refills: 0 | Status: SHIPPED | OUTPATIENT
Start: 2020-04-07 | End: 2020-04-30

## 2020-04-28 ENCOUNTER — TELEPHONE (OUTPATIENT)
Dept: FAMILY MEDICINE CLINIC | Facility: MEDICAL CENTER | Age: 60
End: 2020-04-28

## 2020-04-29 ENCOUNTER — TELEPHONE (OUTPATIENT)
Dept: FAMILY MEDICINE CLINIC | Facility: MEDICAL CENTER | Age: 60
End: 2020-04-29

## 2020-04-29 ENCOUNTER — TELEMEDICINE (OUTPATIENT)
Dept: FAMILY MEDICINE CLINIC | Facility: MEDICAL CENTER | Age: 60
End: 2020-04-29
Payer: MEDICARE

## 2020-04-29 DIAGNOSIS — R09.89 UPPER RESPIRATORY SYMPTOM: Primary | ICD-10-CM

## 2020-04-29 PROCEDURE — 99213 OFFICE O/P EST LOW 20 MIN: CPT | Performed by: FAMILY MEDICINE

## 2020-04-29 RX ORDER — FLUTICASONE PROPIONATE 50 MCG
2 SPRAY, SUSPENSION (ML) NASAL DAILY
Qty: 1 BOTTLE | Refills: 1 | Status: SHIPPED | OUTPATIENT
Start: 2020-04-29 | End: 2020-06-01

## 2020-04-29 RX ORDER — DESLORATADINE 5 MG/1
5 TABLET ORAL DAILY
Qty: 30 TABLET | Refills: 1 | Status: SHIPPED | OUTPATIENT
Start: 2020-04-29 | End: 2020-05-26

## 2020-04-30 DIAGNOSIS — I10 ESSENTIAL HYPERTENSION: ICD-10-CM

## 2020-04-30 RX ORDER — BENAZEPRIL/HYDROCHLOROTHIAZIDE 20 MG-25MG
TABLET ORAL
Qty: 30 TABLET | Refills: 0 | Status: SHIPPED | OUTPATIENT
Start: 2020-04-30 | End: 2020-05-26

## 2020-05-04 ENCOUNTER — TELEPHONE (OUTPATIENT)
Dept: FAMILY MEDICINE CLINIC | Facility: MEDICAL CENTER | Age: 60
End: 2020-05-04

## 2020-05-05 ENCOUNTER — TELEPHONE (OUTPATIENT)
Dept: FAMILY MEDICINE CLINIC | Facility: MEDICAL CENTER | Age: 60
End: 2020-05-05

## 2020-05-06 ENCOUNTER — TELEPHONE (OUTPATIENT)
Dept: FAMILY MEDICINE CLINIC | Facility: MEDICAL CENTER | Age: 60
End: 2020-05-06

## 2020-05-06 ENCOUNTER — TELEMEDICINE (OUTPATIENT)
Dept: FAMILY MEDICINE CLINIC | Facility: MEDICAL CENTER | Age: 60
End: 2020-05-06
Payer: MEDICARE

## 2020-05-06 DIAGNOSIS — J30.89 ENVIRONMENTAL AND SEASONAL ALLERGIES: Primary | ICD-10-CM

## 2020-05-06 PROCEDURE — 99213 OFFICE O/P EST LOW 20 MIN: CPT | Performed by: FAMILY MEDICINE

## 2020-05-06 RX ORDER — MONTELUKAST SODIUM 10 MG/1
10 TABLET ORAL
Qty: 30 TABLET | Refills: 1 | Status: SHIPPED | OUTPATIENT
Start: 2020-05-06 | End: 2020-05-29

## 2020-05-18 ENCOUNTER — APPOINTMENT (OUTPATIENT)
Dept: LAB | Facility: HOSPITAL | Age: 60
End: 2020-05-18
Attending: FAMILY MEDICINE
Payer: MEDICARE

## 2020-05-18 DIAGNOSIS — I10 ESSENTIAL HYPERTENSION: ICD-10-CM

## 2020-05-18 DIAGNOSIS — R73.9 HYPERGLYCEMIA: ICD-10-CM

## 2020-05-18 DIAGNOSIS — R53.83 FATIGUE, UNSPECIFIED TYPE: ICD-10-CM

## 2020-05-18 LAB
ALBUMIN SERPL BCP-MCNC: 3.7 G/DL (ref 3.5–5)
ALP SERPL-CCNC: 111 U/L (ref 46–116)
ALT SERPL W P-5'-P-CCNC: 38 U/L (ref 12–78)
ANION GAP SERPL CALCULATED.3IONS-SCNC: 6 MMOL/L (ref 4–13)
AST SERPL W P-5'-P-CCNC: 49 U/L (ref 5–45)
BILIRUB SERPL-MCNC: 0.53 MG/DL (ref 0.2–1)
BUN SERPL-MCNC: 15 MG/DL (ref 5–25)
CALCIUM SERPL-MCNC: 9.3 MG/DL (ref 8.3–10.1)
CHLORIDE SERPL-SCNC: 102 MMOL/L (ref 100–108)
CHOLEST SERPL-MCNC: 149 MG/DL (ref 50–200)
CO2 SERPL-SCNC: 28 MMOL/L (ref 21–32)
CREAT SERPL-MCNC: 0.92 MG/DL (ref 0.6–1.3)
EST. AVERAGE GLUCOSE BLD GHB EST-MCNC: 160 MG/DL
GFR SERPL CREATININE-BSD FRML MDRD: 69 ML/MIN/1.73SQ M
GLUCOSE SERPL-MCNC: 138 MG/DL (ref 65–140)
HBA1C MFR BLD: 7.2 %
HDLC SERPL-MCNC: 47 MG/DL
LDLC SERPL CALC-MCNC: 73 MG/DL (ref 0–100)
POTASSIUM SERPL-SCNC: 4.4 MMOL/L (ref 3.5–5.3)
PROT SERPL-MCNC: 8.5 G/DL (ref 6.4–8.2)
SODIUM SERPL-SCNC: 136 MMOL/L (ref 136–145)
T4 FREE SERPL-MCNC: 1.16 NG/DL (ref 0.76–1.46)
TRIGL SERPL-MCNC: 145 MG/DL
TSH SERPL DL<=0.05 MIU/L-ACNC: 1.01 UIU/ML (ref 0.36–3.74)

## 2020-05-18 PROCEDURE — 80053 COMPREHEN METABOLIC PANEL: CPT

## 2020-05-18 PROCEDURE — 36415 COLL VENOUS BLD VENIPUNCTURE: CPT

## 2020-05-18 PROCEDURE — 84443 ASSAY THYROID STIM HORMONE: CPT

## 2020-05-18 PROCEDURE — 83036 HEMOGLOBIN GLYCOSYLATED A1C: CPT

## 2020-05-18 PROCEDURE — 84439 ASSAY OF FREE THYROXINE: CPT

## 2020-05-18 PROCEDURE — 86618 LYME DISEASE ANTIBODY: CPT

## 2020-05-18 PROCEDURE — 80061 LIPID PANEL: CPT

## 2020-05-19 LAB — B BURGDOR IGG+IGM SER-ACNC: <0.91 ISR (ref 0–0.9)

## 2020-05-20 ENCOUNTER — OFFICE VISIT (OUTPATIENT)
Dept: FAMILY MEDICINE CLINIC | Facility: MEDICAL CENTER | Age: 60
End: 2020-05-20
Payer: MEDICARE

## 2020-05-20 VITALS
WEIGHT: 293 LBS | HEART RATE: 100 BPM | DIASTOLIC BLOOD PRESSURE: 82 MMHG | SYSTOLIC BLOOD PRESSURE: 122 MMHG | HEIGHT: 66 IN | BODY MASS INDEX: 47.09 KG/M2 | TEMPERATURE: 97.1 F

## 2020-05-20 DIAGNOSIS — J30.89 ENVIRONMENTAL AND SEASONAL ALLERGIES: ICD-10-CM

## 2020-05-20 DIAGNOSIS — Z00.00 MEDICARE ANNUAL WELLNESS VISIT, SUBSEQUENT: ICD-10-CM

## 2020-05-20 DIAGNOSIS — I10 ESSENTIAL HYPERTENSION: Primary | ICD-10-CM

## 2020-05-20 DIAGNOSIS — R73.9 HYPERGLYCEMIA: ICD-10-CM

## 2020-05-20 PROCEDURE — 3074F SYST BP LT 130 MM HG: CPT | Performed by: FAMILY MEDICINE

## 2020-05-20 PROCEDURE — 99214 OFFICE O/P EST MOD 30 MIN: CPT | Performed by: FAMILY MEDICINE

## 2020-05-20 PROCEDURE — 1036F TOBACCO NON-USER: CPT | Performed by: FAMILY MEDICINE

## 2020-05-20 PROCEDURE — G0438 PPPS, INITIAL VISIT: HCPCS | Performed by: FAMILY MEDICINE

## 2020-05-20 PROCEDURE — 3008F BODY MASS INDEX DOCD: CPT | Performed by: FAMILY MEDICINE

## 2020-05-20 PROCEDURE — 3079F DIAST BP 80-89 MM HG: CPT | Performed by: FAMILY MEDICINE

## 2020-05-22 ENCOUNTER — TELEPHONE (OUTPATIENT)
Dept: FAMILY MEDICINE CLINIC | Facility: MEDICAL CENTER | Age: 60
End: 2020-05-22

## 2020-05-23 DIAGNOSIS — R09.89 UPPER RESPIRATORY SYMPTOM: ICD-10-CM

## 2020-05-23 DIAGNOSIS — I10 ESSENTIAL HYPERTENSION: ICD-10-CM

## 2020-05-26 RX ORDER — BENAZEPRIL/HYDROCHLOROTHIAZIDE 20 MG-25MG
TABLET ORAL
Qty: 30 TABLET | Refills: 0 | Status: SHIPPED | OUTPATIENT
Start: 2020-05-26 | End: 2020-06-22

## 2020-05-26 RX ORDER — DESLORATADINE 5 MG/1
TABLET ORAL
Qty: 30 TABLET | Refills: 1 | Status: SHIPPED | OUTPATIENT
Start: 2020-05-26 | End: 2020-07-31

## 2020-05-28 DIAGNOSIS — J30.89 ENVIRONMENTAL AND SEASONAL ALLERGIES: ICD-10-CM

## 2020-05-29 RX ORDER — MONTELUKAST SODIUM 10 MG/1
TABLET ORAL
Qty: 90 TABLET | Refills: 0 | Status: SHIPPED | OUTPATIENT
Start: 2020-05-29 | End: 2020-08-25

## 2020-06-01 DIAGNOSIS — R09.89 UPPER RESPIRATORY SYMPTOM: ICD-10-CM

## 2020-06-01 RX ORDER — FLUTICASONE PROPIONATE 50 MCG
SPRAY, SUSPENSION (ML) NASAL
Qty: 3 BOTTLE | Refills: 1 | Status: SHIPPED | OUTPATIENT
Start: 2020-06-01 | End: 2021-03-09 | Stop reason: SDUPTHER

## 2020-06-10 ENCOUNTER — TELEPHONE (OUTPATIENT)
Dept: FAMILY MEDICINE CLINIC | Facility: MEDICAL CENTER | Age: 60
End: 2020-06-10

## 2020-06-20 DIAGNOSIS — I10 ESSENTIAL HYPERTENSION: ICD-10-CM

## 2020-06-22 RX ORDER — BENAZEPRIL/HYDROCHLOROTHIAZIDE 20 MG-25MG
TABLET ORAL
Qty: 90 TABLET | Refills: 0 | Status: SHIPPED | OUTPATIENT
Start: 2020-06-22 | End: 2020-09-22 | Stop reason: ALTCHOICE

## 2020-07-31 DIAGNOSIS — R09.89 UPPER RESPIRATORY SYMPTOM: ICD-10-CM

## 2020-07-31 RX ORDER — DESLORATADINE 5 MG/1
TABLET ORAL
Qty: 30 TABLET | Refills: 1 | Status: SHIPPED | OUTPATIENT
Start: 2020-07-31 | End: 2020-10-01

## 2020-08-17 ENCOUNTER — OFFICE VISIT (OUTPATIENT)
Dept: FAMILY MEDICINE CLINIC | Facility: MEDICAL CENTER | Age: 60
End: 2020-08-17
Payer: MEDICARE

## 2020-08-17 ENCOUNTER — APPOINTMENT (OUTPATIENT)
Dept: LAB | Facility: MEDICAL CENTER | Age: 60
End: 2020-08-17
Payer: MEDICARE

## 2020-08-17 VITALS
HEIGHT: 66 IN | HEART RATE: 92 BPM | WEIGHT: 285 LBS | BODY MASS INDEX: 45.8 KG/M2 | DIASTOLIC BLOOD PRESSURE: 86 MMHG | SYSTOLIC BLOOD PRESSURE: 122 MMHG | TEMPERATURE: 97.4 F

## 2020-08-17 DIAGNOSIS — J30.89 ENVIRONMENTAL AND SEASONAL ALLERGIES: ICD-10-CM

## 2020-08-17 DIAGNOSIS — Z12.31 VISIT FOR SCREENING MAMMOGRAM: ICD-10-CM

## 2020-08-17 DIAGNOSIS — E11.9 TYPE 2 DIABETES MELLITUS WITHOUT COMPLICATION, WITHOUT LONG-TERM CURRENT USE OF INSULIN (HCC): Primary | ICD-10-CM

## 2020-08-17 DIAGNOSIS — E11.9 TYPE 2 DIABETES MELLITUS WITHOUT COMPLICATION, WITHOUT LONG-TERM CURRENT USE OF INSULIN (HCC): ICD-10-CM

## 2020-08-17 DIAGNOSIS — I10 ESSENTIAL HYPERTENSION: ICD-10-CM

## 2020-08-17 DIAGNOSIS — Z12.4 SCREENING FOR CERVICAL CANCER: ICD-10-CM

## 2020-08-17 LAB
ALBUMIN SERPL BCP-MCNC: 3.7 G/DL (ref 3.5–5)
ALP SERPL-CCNC: 90 U/L (ref 46–116)
ALT SERPL W P-5'-P-CCNC: 37 U/L (ref 12–78)
ANION GAP SERPL CALCULATED.3IONS-SCNC: 6 MMOL/L (ref 4–13)
AST SERPL W P-5'-P-CCNC: 41 U/L (ref 5–45)
BILIRUB SERPL-MCNC: 0.43 MG/DL (ref 0.2–1)
BILIRUB UR QL STRIP: NEGATIVE
BUN SERPL-MCNC: 14 MG/DL (ref 5–25)
CALCIUM SERPL-MCNC: 9.2 MG/DL (ref 8.3–10.1)
CHLORIDE SERPL-SCNC: 106 MMOL/L (ref 100–108)
CHOLEST SERPL-MCNC: 149 MG/DL (ref 50–200)
CLARITY UR: NORMAL
CO2 SERPL-SCNC: 26 MMOL/L (ref 21–32)
COLOR UR: YELLOW
CREAT SERPL-MCNC: 0.85 MG/DL (ref 0.6–1.3)
CREAT UR-MCNC: 162 MG/DL
GFR SERPL CREATININE-BSD FRML MDRD: 76 ML/MIN/1.73SQ M
GLUCOSE P FAST SERPL-MCNC: 120 MG/DL (ref 65–99)
GLUCOSE UR STRIP-MCNC: NEGATIVE MG/DL
HDLC SERPL-MCNC: 47 MG/DL
HGB UR QL STRIP.AUTO: NEGATIVE
KETONES UR STRIP-MCNC: NEGATIVE MG/DL
LDLC SERPL CALC-MCNC: 81 MG/DL (ref 0–100)
LEUKOCYTE ESTERASE UR QL STRIP: NEGATIVE
MICROALBUMIN UR-MCNC: 15.4 MG/L (ref 0–20)
MICROALBUMIN/CREAT 24H UR: 10 MG/G CREATININE (ref 0–30)
NITRITE UR QL STRIP: NEGATIVE
PH UR STRIP.AUTO: 6 [PH]
POTASSIUM SERPL-SCNC: 4.2 MMOL/L (ref 3.5–5.3)
PROT SERPL-MCNC: 8.4 G/DL (ref 6.4–8.2)
PROT UR STRIP-MCNC: NEGATIVE MG/DL
SL AMB POCT HEMOGLOBIN AIC: 6.5 (ref ?–6.5)
SODIUM SERPL-SCNC: 138 MMOL/L (ref 136–145)
SP GR UR STRIP.AUTO: 1.02 (ref 1–1.03)
TRIGL SERPL-MCNC: 105 MG/DL
UROBILINOGEN UR QL STRIP.AUTO: 0.2 E.U./DL

## 2020-08-17 PROCEDURE — 3074F SYST BP LT 130 MM HG: CPT | Performed by: FAMILY MEDICINE

## 2020-08-17 PROCEDURE — 99214 OFFICE O/P EST MOD 30 MIN: CPT | Performed by: FAMILY MEDICINE

## 2020-08-17 PROCEDURE — 1036F TOBACCO NON-USER: CPT | Performed by: FAMILY MEDICINE

## 2020-08-17 PROCEDURE — 82570 ASSAY OF URINE CREATININE: CPT | Performed by: FAMILY MEDICINE

## 2020-08-17 PROCEDURE — 81003 URINALYSIS AUTO W/O SCOPE: CPT | Performed by: FAMILY MEDICINE

## 2020-08-17 PROCEDURE — 36415 COLL VENOUS BLD VENIPUNCTURE: CPT

## 2020-08-17 PROCEDURE — 82043 UR ALBUMIN QUANTITATIVE: CPT | Performed by: FAMILY MEDICINE

## 2020-08-17 PROCEDURE — 3044F HG A1C LEVEL LT 7.0%: CPT | Performed by: FAMILY MEDICINE

## 2020-08-17 PROCEDURE — 83036 HEMOGLOBIN GLYCOSYLATED A1C: CPT | Performed by: FAMILY MEDICINE

## 2020-08-17 PROCEDURE — 3008F BODY MASS INDEX DOCD: CPT | Performed by: FAMILY MEDICINE

## 2020-08-17 PROCEDURE — 80061 LIPID PANEL: CPT

## 2020-08-17 PROCEDURE — 3079F DIAST BP 80-89 MM HG: CPT | Performed by: FAMILY MEDICINE

## 2020-08-17 PROCEDURE — 80053 COMPREHEN METABOLIC PANEL: CPT

## 2020-08-17 NOTE — PROGRESS NOTES
Assessment/Plan:       Diagnoses and all orders for this visit:    Type 2 diabetes mellitus without complication, without long-term current use of insulin (HCC)  -     POCT hemoglobin A1c  -     Comprehensive metabolic panel; Future  -     Lipid Panel with Direct LDL reflex; Future  -     Microalbumin / creatinine urine ratio  -     UA (URINE) with reflex to Scope  Patient has type 2 diabetes as previous A1c was 7 2% and in office A1c today was 6 5%  No medication at this time  Continue with healthy diet and regular physical activity to help keep glucose controlled  Check labs  I informed patient it is highly recommended she start a statin as having diabetes puts her at higher cardiovascular risk  Will check labs to assess hepatic function  Patient otherwise agreeable to starting medication  Patient was encouraged to schedule appointment for the eye doctor  A list of local eye doctors was provided  Essential hypertension  -     Comprehensive metabolic panel; Future  -     Microalbumin / creatinine urine ratio  -     UA (URINE) with reflex to Scope  Blood pressure well controlled  Check labs to assess renal function  Continue with benazepril and hydrochlorothiazide  Environmental and seasonal allergies  Allergies Well controlled  Continue Singulair, Flonase and Clarinex  Screening for cervical cancer  Patient was highly encouraged to go for a Pap smear to screen for cervical cancer  She declines to go to any other offices at this time due to the COVID-19 pandemic  I stressed the importance of routine cervical cancer screening  Patient acknowledged understanding but states she will not go to any other office  Visit for screening mammogram  Patient was highly encouraged to go for a mammogram for breast cancer screening  She declines to go for any testing at this time due to COVID-19 pandemic  I stressed the importance of routine mammograms for the purposes of screening for breast cancer  Patient acknowledged understanding but states she will not go for the test     Follow-up in three months or sooner if needed  Subjective:      Patient ID: Lizett Jain is a 64 y o  female  Patient presents for follow-up  She has type 2 diabetes  Not on any medication at this time  Has been utilizing regular diet and increased physical activity to help control her blood glucose and she has also been able to lose weight  She has hypertension  Currently on benazepril with hydrochlorothiazide  Tolerating medication well  She has environmental and seasonal allergies  Current medications include Clarinex, Flonase and Singulair  Medications work well to keep her allergy symptoms controlled  The following portions of the patient's history were reviewed and updated as appropriate: She  has a past medical history of Fatty liver, GERD (gastroesophageal reflux disease), and Hypertension  She   Patient Active Problem List    Diagnosis Date Noted    Diabetes ([facility] ) 05/20/2020    Environmental and seasonal allergies 05/06/2020    HTN (hypertension) 03/09/2020    GERD (gastroesophageal reflux disease) 03/09/2020    Morbid obesity ([place] [address] ) 03/09/2020    Cervical spondylosis 06/19/2014     She  has a past surgical history that includes Back surgery; Finger surgery; Tubal ligation; and Sinus surgery  Her family history includes Atrial fibrillation in her mother; Coronary artery disease in her father; Diabetes in her father; Heart disease in her mother; Thyroid disease in her mother  She  reports that she has never smoked  She has never used smokeless tobacco  She reports that she does not drink alcohol or use drugs    Current Outpatient Medications   Medication Sig Dispense Refill    benazepril-hydrochlorthiazide (LOTENSIN HCT) 20-25 MG per tablet TAKE 1 TABLET BY MOUTH EVERY DAY 90 tablet 0    desloratadine (CLARINEX) 5 MG tablet TAKE ONE TABLET BY MOUTH DAILY 30 tablet 1    fluticasone (FLONASE) 50 mcg/act nasal spray INSTILL TWO SPRAYS INTO EACH NOSTRIL DAILY 3 Bottle 1    montelukast (SINGULAIR) 10 mg tablet TAKE 1 TABLET BY MOUTH DAILY AT BEDTIME 90 tablet 0    Omeprazole 20 MG TBDD Take 20 mg by mouth daily       No current facility-administered medications for this visit  Current Outpatient Medications on File Prior to Visit   Medication Sig    benazepril-hydrochlorthiazide (LOTENSIN HCT) 20-25 MG per tablet TAKE 1 TABLET BY MOUTH EVERY DAY    desloratadine (CLARINEX) 5 MG tablet TAKE ONE TABLET BY MOUTH DAILY    fluticasone (FLONASE) 50 mcg/act nasal spray INSTILL TWO SPRAYS INTO EACH NOSTRIL DAILY    montelukast (SINGULAIR) 10 mg tablet TAKE 1 TABLET BY MOUTH DAILY AT BEDTIME    Omeprazole 20 MG TBDD Take 20 mg by mouth daily    [DISCONTINUED] Misc  Devices MISC Automatic blood pressure cuff to use daily as directed  No current facility-administered medications on file prior to visit  She is allergic to penicillins       Review of Systems   Constitutional: Negative for fever  Respiratory: Negative for shortness of breath  Cardiovascular: Negative for chest pain  Objective:      /86 (BP Location: Left arm, Patient Position: Sitting, Cuff Size: Large)   Pulse 92   Temp (!) 97 4 °F (36 3 °C)   Ht 5' 6" (1 676 m)   Wt 129 kg (285 lb)   BMI 46 00 kg/m²          Physical Exam  Constitutional:       Appearance: She is well-developed  She is obese  Cardiovascular:      Rate and Rhythm: Normal rate and regular rhythm  Pulses: no weak pulses          Dorsalis pedis pulses are 2+ on the right side and 2+ on the left side  Posterior tibial pulses are 2+ on the right side and 2+ on the left side  Heart sounds: Normal heart sounds  Comments: Rate was normal on exam despite having tachycardia on initial vital signs  Pulmonary:      Effort: Pulmonary effort is normal       Breath sounds: Normal breath sounds     Feet:      Right foot: Skin integrity: No ulcer, skin breakdown, erythema, warmth, callus or dry skin  Left foot:      Skin integrity: No ulcer, skin breakdown, erythema, warmth, callus or dry skin  Diabetic Foot Exam    Patient's shoes and socks removed  Right Foot/Ankle   Right Foot Inspection  Skin Exam: skin normal and skin intact no dry skin, no warmth, no callus, no erythema, no maceration, no abnormal color, no pre-ulcer, no ulcer and no callus                          Toe Exam: ROM and strength within normal limits  Sensory   Vibration: intact    Monofilament testing: intact  Vascular  Capillary refills: < 3 seconds  The right DP pulse is 2+  The right PT pulse is 2+  Left Foot/Ankle  Left Foot Inspection  Skin Exam: skin normal and skin intactno dry skin, no warmth, no erythema, no maceration, normal color, no pre-ulcer, no ulcer and no callus                         Toe Exam: ROM and strength within normal limits                   Sensory   Vibration: intact    Monofilament: intact  Vascular  Capillary refills: < 3 seconds  The left DP pulse is 2+  The left PT pulse is 2+  Assign Risk Category:  No deformity present; No loss of protective sensation;  No weak pulses       Risk: 0

## 2020-08-18 ENCOUNTER — TELEPHONE (OUTPATIENT)
Dept: FAMILY MEDICINE CLINIC | Facility: MEDICAL CENTER | Age: 60
End: 2020-08-18

## 2020-08-18 DIAGNOSIS — E11.9 TYPE 2 DIABETES MELLITUS WITHOUT COMPLICATION, WITHOUT LONG-TERM CURRENT USE OF INSULIN (HCC): Primary | ICD-10-CM

## 2020-08-18 RX ORDER — PRAVASTATIN SODIUM 10 MG
10 TABLET ORAL
Qty: 30 TABLET | Refills: 1 | Status: SHIPPED | OUTPATIENT
Start: 2020-08-18 | End: 2020-09-14

## 2020-08-18 NOTE — TELEPHONE ENCOUNTER
Pravastatin 10 mg daily faxed to patient's pharmacy  Labs ordered for six weeks from now to monitor her lipid levels and liver function  Medication will be adjusted if needed based on results in six weeks

## 2020-08-18 NOTE — TELEPHONE ENCOUNTER
----- Message from Robert Britton DO sent at 8/18/2020  8:53 AM EDT -----  Labs reviewed showing normal liver function  I would like patient to start a statin medication to lower her cardiovascular risk as we discussed yesterday  We will then repeat labs in six weeks to make sure liver function remains stable  If patient is agreeable I will fax medication into her pharmacy

## 2020-08-18 NOTE — TELEPHONE ENCOUNTER
Pt aware- in agreement- please [facility] to [facility]  To mail blood work orders   She would like a copy of her labs as well

## 2020-08-25 DIAGNOSIS — J30.89 ENVIRONMENTAL AND SEASONAL ALLERGIES: ICD-10-CM

## 2020-08-25 RX ORDER — MONTELUKAST SODIUM 10 MG/1
TABLET ORAL
Qty: 90 TABLET | Refills: 0 | Status: SHIPPED | OUTPATIENT
Start: 2020-08-25 | End: 2020-12-22

## 2020-09-11 DIAGNOSIS — E11.9 TYPE 2 DIABETES MELLITUS WITHOUT COMPLICATION, WITHOUT LONG-TERM CURRENT USE OF INSULIN (HCC): ICD-10-CM

## 2020-09-14 RX ORDER — PRAVASTATIN SODIUM 10 MG
TABLET ORAL
Qty: 30 TABLET | Refills: 1 | Status: SHIPPED | OUTPATIENT
Start: 2020-09-14 | End: 2020-10-10

## 2020-09-19 ENCOUNTER — HOSPITAL ENCOUNTER (EMERGENCY)
Facility: HOSPITAL | Age: 60
Discharge: HOME | End: 2020-09-19
Attending: EMERGENCY MEDICINE | Admitting: EMERGENCY MEDICINE
Payer: MEDICARE

## 2020-09-19 ENCOUNTER — APPOINTMENT (EMERGENCY)
Dept: RADIOLOGY | Facility: HOSPITAL | Age: 60
End: 2020-09-19
Payer: MEDICARE

## 2020-09-19 VITALS
RESPIRATION RATE: 20 BRPM | HEART RATE: 110 BPM | WEIGHT: 288.8 LBS | OXYGEN SATURATION: 97 % | BODY MASS INDEX: 43.77 KG/M2 | DIASTOLIC BLOOD PRESSURE: 100 MMHG | SYSTOLIC BLOOD PRESSURE: 143 MMHG | HEIGHT: 68 IN | TEMPERATURE: 99.3 F

## 2020-09-19 DIAGNOSIS — M10.9 ACUTE GOUT INVOLVING TOE OF RIGHT FOOT, UNSPECIFIED CAUSE: Primary | ICD-10-CM

## 2020-09-19 DIAGNOSIS — I10 ESSENTIAL HYPERTENSION: ICD-10-CM

## 2020-09-19 PROCEDURE — 96372 THER/PROPH/DIAG INJ SC/IM: CPT

## 2020-09-19 PROCEDURE — 73630 X-RAY EXAM OF FOOT: CPT

## 2020-09-19 PROCEDURE — 99284 EMERGENCY DEPT VISIT MOD MDM: CPT | Performed by: EMERGENCY MEDICINE

## 2020-09-19 PROCEDURE — 99283 EMERGENCY DEPT VISIT LOW MDM: CPT

## 2020-09-19 RX ORDER — IBUPROFEN 800 MG/1
800 TABLET ORAL 3 TIMES DAILY
Qty: 21 TABLET | Refills: 0 | Status: SHIPPED | OUTPATIENT
Start: 2020-09-19

## 2020-09-19 RX ORDER — MAGNESIUM HYDROXIDE/ALUMINUM HYDROXICE/SIMETHICONE 120; 1200; 1200 MG/30ML; MG/30ML; MG/30ML
30 SUSPENSION ORAL ONCE
Status: COMPLETED | OUTPATIENT
Start: 2020-09-19 | End: 2020-09-19

## 2020-09-19 RX ORDER — PREDNISONE 10 MG/1
TABLET ORAL
Qty: 30 TABLET | Refills: 0 | Status: SHIPPED | OUTPATIENT
Start: 2020-09-19 | End: 2020-09-29

## 2020-09-19 RX ORDER — KETOROLAC TROMETHAMINE 30 MG/ML
15 INJECTION, SOLUTION INTRAMUSCULAR; INTRAVENOUS ONCE
Status: COMPLETED | OUTPATIENT
Start: 2020-09-19 | End: 2020-09-19

## 2020-09-19 RX ORDER — LIDOCAINE HYDROCHLORIDE 20 MG/ML
15 SOLUTION OROPHARYNGEAL ONCE
Status: COMPLETED | OUTPATIENT
Start: 2020-09-19 | End: 2020-09-19

## 2020-09-19 RX ORDER — IBUPROFEN 800 MG/1
800 TABLET ORAL 3 TIMES DAILY
Qty: 21 TABLET | Refills: 0 | Status: SHIPPED | OUTPATIENT
Start: 2020-09-19 | End: 2020-09-19 | Stop reason: SDUPTHER

## 2020-09-19 RX ADMIN — LIDOCAINE HYDROCHLORIDE 15 ML: 20 SOLUTION ORAL; TOPICAL at 02:06

## 2020-09-19 RX ADMIN — PREDNISONE 50 MG: 20 TABLET ORAL at 02:46

## 2020-09-19 RX ADMIN — ALUMINUM HYDROXIDE, MAGNESIUM HYDROXIDE, AND SIMETHICONE 30 ML: 200; 200; 20 SUSPENSION ORAL at 02:07

## 2020-09-19 RX ADMIN — KETOROLAC TROMETHAMINE 15 MG: 30 INJECTION, SOLUTION INTRAMUSCULAR at 02:19

## 2020-09-19 NOTE — DISCHARGE INSTRUCTIONS
Your exam today was concerning for gout  This could be caused by your medication, Lotensin HCT, please hold this medication until you call your family doctor for follow-up on Monday, and discuss continuation of your medication at that time with family doctor  Take ibuprofen and prednisone as prescribed, do not miss any doses of prednisone, taken with meals  Prednisone may cause your blood sugar to become elevated, please closely monitor it  Ibuprofen and prednisone may also cause her stomach to become upset  Return to the emergency department for any new or worsening symptoms

## 2020-09-19 NOTE — ED PROVIDER NOTES
Pt Name: Erica Villareal  MRN: [id number]  [place] 1960  Age/Sex: 64 y o  female  Date of evaluation: 9/19/2020  PCP: Dangelo Durán, [address] [place] [address]    Chief Complaint   Patient presents with    Foot Pain     c/o foot pain that started x2days, denies bumping into anything with her foot and states it radiates up her inner leg  HPI    64 y o  female presenting with foot pain  Patient states she started having pain in her right foot this past Wednesday and has been worsening since  Now it is difficult walk because of how painful it is  Pain is primarily in her right great toe  It is somewhat red  She states the pain does shoot up her foot as well  Denies any trauma to her foot, no falls  No fevers or chills  Does report some nausea, states her stomach feels queasy from taking much ibuprofen  No calf swelling bilaterally, no shortness of breath  No breaks in her skin  States she is prediabetic  Past Medical and Surgical History    Past Medical History:   Diagnosis Date    Diabetes mellitus ([address] )     Fatty liver     GERD (gastroesophageal reflux disease)     Hypertension        Past Surgical History:   Procedure Laterality Date    BACK SURGERY      FINGER SURGERY      SINUS SURGERY      TUBAL LIGATION         Family History   Problem Relation Age of Onset    Heart disease Mother     Atrial fibrillation Mother     Thyroid disease Mother     Coronary artery disease Father     Diabetes Father        Social History     Tobacco Use    Smoking status: Never Smoker    Smokeless tobacco: Never Used   Substance Use Topics    Alcohol use: Never     Frequency: Never    Drug use: Never           Allergies    Allergies   Allergen Reactions    Penicillins        Home Medications    Prior to Admission medications    Medication Sig Start Date End Date Taking?  Authorizing Provider   benazepril-hydrochlorthiazide (LOTENSIN HCT) 20-25 MG per tablet TAKE 1 TABLET BY MOUTH EVERY DAY 6/22/20  Yes Mushtaq Medina DO   desloratadine (CLARINEX) 5 MG tablet TAKE ONE TABLET BY MOUTH DAILY 7/31/20  Yes Mushtaq Medina DO   fluticasone [facility]) 50 mcg/act nasal spray INSTILL TWO SPRAYS INTO EACH NOSTRIL DAILY 6/1/20  Yes Mushtaq Medina DO   montelukast (SINGULAIR) 10 mg tablet TAKE 1 TABLET BY MOUTH EVERYDAY AT BEDTIME 8/25/20  Yes Mushtaq Medina DO   Omeprazole 20 MG TBDD Take 20 mg by mouth daily 5/30/14  Yes Historical Provider, MD   pravastatin (PRAVACHOL) 10 mg tablet TAKE 1 TABLET BY MOUTH DAILY AT BEDTIME 9/14/20  Yes Mushtaq Medina DO           Review of Systems    Review of Systems   Constitutional: Negative for chills and fever  HENT: Negative for rhinorrhea and sore throat  Eyes: Negative for pain and visual disturbance  Respiratory: Negative for cough and shortness of breath  Cardiovascular: Negative for chest pain and leg swelling  Gastrointestinal: Positive for nausea  Negative for abdominal pain and vomiting  Genitourinary: Negative for dysuria and hematuria  Musculoskeletal: Negative for back pain and myalgias  Right foot/1st toe pain   Skin: Positive for color change  Negative for wound  Neurological: Negative for syncope and headaches  All other systems reviewed and negative  Physical Exam      ED Triage Vitals [09/19/20 0139]   Temperature Pulse Respirations Blood Pressure SpO2   99 3 °F (37 4 °C) (!) 110 20 143/100 97 %      Temp Source Heart Rate Source Patient Position - Orthostatic VS BP Location FiO2 (%)   Oral Monitor Sitting Right arm --      Pain Score       Worst Possible Pain               Physical Exam  Constitutional:       General: She is not in acute distress  Appearance: She is not ill-appearing  HENT:      Head: Normocephalic and atraumatic  Nose: Nose normal       Mouth/Throat:      Mouth: Mucous membranes are moist    Eyes:      Extraocular Movements: Extraocular movements intact        Pupils: Pupils are equal, round, and reactive to light  Neck:      Musculoskeletal: Normal range of motion and neck supple  Cardiovascular:      Rate and Rhythm: Normal rate and regular rhythm  Comments: DP pulses intact bilaterally  Pulmonary:      Effort: Pulmonary effort is normal  No respiratory distress  Abdominal:      General: Abdomen is flat  There is no distension  Tenderness: There is no abdominal tenderness  Musculoskeletal:         General: No deformity  Comments: Right 1st MTP swelling/tenderness  Full range of motion of right ankle, right knee, right hip  Skin:     General: Skin is warm  Comments: Erythema localized over right 1st MTP  Neurological:      Mental Status: She is alert and oriented to person, place, and time  Mental status is at baseline  Sensory: No sensory deficit  Motor: No weakness  Diagnostic Results      Labs:    Results Reviewed     None          All labs reviewed and utilized in the medical decision making process    Radiology:    XR foot 3+ views RIGHT    (Results Pending)       All radiology studies independently viewed by me and interpreted by the radiologist     Procedure    Procedures        MDM    Exam is primarily concerning for acute gouty flare  No history of gout  No increase intake of beer, no increase meet/seafood intake, no trauma to the area  However, patient is on hydrochlorothiazide which may be the culprit for her gouty flare  Will obtain an x-ray to make sure there is no other acute bony process  Doubt infection  Patient states she has an upset stomach from ibuprofen use, will give her GI cocktail and then give her Toradol  ED Course as of Sep 19 0253   Sat Sep 19, 2020   0233 No obvious fracture dislocation noted on x-ray  Will give patient dose of prednisone  XR foot 3+ views RIGHT   6603 Patient updated with results  Provided prescription for prednisone taper and ibuprofen  Advised to take with meals    Advised to keep a close eye on her blood sugar  Advised to hold off on her hydrochlorothiazide, and to discuss with her family doctor on Monday and to schedule follow-up appointment  Strict ED return precautions discussed  Medications   aluminum-magnesium hydroxide-simethicone (MYLANTA) 200-200-20 mg/5 mL oral suspension 30 mL (30 mL Oral Given 9/19/20 0207)   Lidocaine Viscous HCl (XYLOCAINE) 2 % mucosal solution 15 mL (15 mL Swish & Spit Given 9/19/20 0206)   ketorolac (TORADOL) injection 15 mg (15 mg Intramuscular Given 9/19/20 0219)   predniSONE tablet 50 mg (50 mg Oral Given 9/19/20 0246)           FINAL IMPRESSION    Final diagnoses:   Acute gout involving toe of right foot, unspecified cause         DISPOSITION    Time reflects when diagnosis was documented in both MDM as applicable and the Disposition within this note     Time User Action Codes Description Comment    9/19/2020  2:34 AM Tres Vital Add [M10 9] Acute gout involving toe of right foot, unspecified cause       ED Disposition     ED Disposition Condition Date/Time Comment    Discharge Stable Sat Sep 19, 2020  2:34 AM Meka Shannon discharge to home/self care              Follow-up Information     Follow up With Specialties Details Why Contact Info    Ronaldo Pedro DO Family Medicine On 9/21/2020  [address] [place] [address]  [phone]              PATIENT REFERRED TO:    Ronaldo Pedro DO  [address] [place] [address] [address]  [phone]    On 9/21/2020        DISCHARGE MEDICATIONS:    Patient's Medications   Discharge Prescriptions    IBUPROFEN (MOTRIN) 800 MG TABLET    Take 1 tablet (800 mg total) by mouth 3 (three) times a day Take with meals       Start Date: 9/19/2020 End Date: --       Order Dose: 800 mg       Quantity: 21 tablet    Refills: 0    PREDNISONE 10 MG TABLET    Take 5 tablets (50 mg total) by mouth daily for 2 days, THEN 4 tablets (40 mg total) daily for 2 days, THEN 3 tablets (30 mg total) daily for 2 days, THEN 2 tablets (20 mg total) daily for 2 days, THEN 1 tablet (10 mg total) daily for 2 days  Start Date: 9/19/2020 End Date: 9/29/2020       Order Dose: --       Quantity: 30 tablet    Refills: 0       No discharge procedures on file  Vonnie Apley, DO        This note was partially completed using voice recognition technology, and was scanned for gross errors; however some errors may still exist  Please contact the author with any questions or requests for clarification        Vonnie Apley, DO  09/19/20 2621

## 2020-09-21 ENCOUNTER — TELEPHONE (OUTPATIENT)
Dept: FAMILY MEDICINE CLINIC | Facility: MEDICAL CENTER | Age: 60
End: 2020-09-21

## 2020-09-21 NOTE — TELEPHONE ENCOUNTER
Patient called, she went to ED Friday for swelling, painful, red, unable to ambulate her foot  They advised she call you to change her blood pressure  They discontinued the Benazepril/hctz stating the water pill caused GOUT  Please send to [facility], she cannot get her shoe on  Also, they put her on Prednisone and Motrin and advised her to have PCP monitor her sugar while on these medications

## 2020-09-22 ENCOUNTER — OFFICE VISIT (OUTPATIENT)
Dept: FAMILY MEDICINE CLINIC | Facility: MEDICAL CENTER | Age: 60
End: 2020-09-22
Payer: MEDICARE

## 2020-09-22 VITALS
BODY MASS INDEX: 43.65 KG/M2 | DIASTOLIC BLOOD PRESSURE: 92 MMHG | WEIGHT: 288 LBS | SYSTOLIC BLOOD PRESSURE: 150 MMHG | TEMPERATURE: 97.2 F | HEART RATE: 85 BPM | HEIGHT: 68 IN

## 2020-09-22 DIAGNOSIS — I10 ESSENTIAL HYPERTENSION: ICD-10-CM

## 2020-09-22 DIAGNOSIS — E11.9 TYPE 2 DIABETES MELLITUS WITHOUT COMPLICATION, WITHOUT LONG-TERM CURRENT USE OF INSULIN (HCC): ICD-10-CM

## 2020-09-22 DIAGNOSIS — Z23 ENCOUNTER FOR IMMUNIZATION: ICD-10-CM

## 2020-09-22 DIAGNOSIS — M10.9 GOUT, UNSPECIFIED CAUSE, UNSPECIFIED CHRONICITY, UNSPECIFIED SITE: Primary | ICD-10-CM

## 2020-09-22 PROCEDURE — 90732 PPSV23 VACC 2 YRS+ SUBQ/IM: CPT

## 2020-09-22 PROCEDURE — 90682 RIV4 VACC RECOMBINANT DNA IM: CPT

## 2020-09-22 PROCEDURE — G0009 ADMIN PNEUMOCOCCAL VACCINE: HCPCS

## 2020-09-22 PROCEDURE — 99214 OFFICE O/P EST MOD 30 MIN: CPT | Performed by: FAMILY MEDICINE

## 2020-09-22 PROCEDURE — G0008 ADMIN INFLUENZA VIRUS VAC: HCPCS

## 2020-09-22 RX ORDER — BENAZEPRIL HYDROCHLORIDE 20 MG/1
20 TABLET ORAL DAILY
Qty: 90 TABLET | Refills: 0 | Status: SHIPPED | OUTPATIENT
Start: 2020-09-22 | End: 2020-09-29 | Stop reason: SDUPTHER

## 2020-09-22 NOTE — PROGRESS NOTES
Assessment/Plan:       Diagnoses and all orders for this visit:    Gout, unspecified cause, unspecified chronicity, unspecified site  -     Uric acid; Future  Patient has gout  Likely secondary to hydrochlorothiazide use  That medication has been stopped  May need to take daily medication for uric acid levels  Check uric acid level in one week  Essential hypertension  -     benazepril (LOTENSIN) 20 mg tablet; Take 1 tablet (20 mg total) by mouth daily  Blood pressure not well controlled due to stopping benazepril with hydrochlorothiazide  Restart benazepril without the diuretic so as to not cause another gout flare  Patient to check her blood pressure and call the office next week with values  Type 2 diabetes mellitus without complication, without long-term current use of insulin (HCC)  Currently diet controlled  On statin therapy as well to reduce cardiovascular risk  Due for repeat labs next week  Was also on benazepril with hydrochlorothiazide for the renal protection affects of benazepril  Since that has been stopped due to gout flare benazepril on has been started today to help with hypertension and renal protection  Encounter for immunization  -     PNEUMOCOCCAL POLYSACCHARIDE VACCINE 23-VALENT =>3YO SQ IM  -     influenza vaccine, quadrivalent, recombinant, PF, 0 5 mL, for patients 18 yr+ (FLUBLOK)  Vaccines given and tolerated well  Follow-up in November as previously scheduled or sooner if needed  Subjective:      Patient ID: Karen Jenkins is a 64 y o  female  Patient presents for follow-up  She has gout  Recently seen in the ER secondary to right great toe pain  Diagnosed with gout and given steroids to take for symptoms  Patient states the toe is much improved  She has hypertension  Due to recent diagnosis of gout the ER doc recommended she stop her blood pressure medication  She was taking benazepril with hydrochlorothiazide    She was told that the blood pressure medication can trigger gout  She has diabetes  Currently diet controlled  Continues to take her statin  No longer taking her benazepril with hydrochlorothiazide as discussed above  The following portions of the patient's history were reviewed and updated as appropriate: She  has a past medical history of Diabetes mellitus ([facility] ), Fatty liver, GERD (gastroesophageal reflux disease), and Hypertension  She   Patient Active Problem List    Diagnosis Date Noted    Gout 09/22/2020    Diabetes ([place] [address] ) 05/20/2020    Environmental and seasonal allergies 05/06/2020    HTN (hypertension) 03/09/2020    GERD (gastroesophageal reflux disease) 03/09/2020    Morbid obesity ([place] [address] ) 03/09/2020    Cervical spondylosis 06/19/2014     She  has a past surgical history that includes Back surgery; Finger surgery; Tubal ligation; and Sinus surgery  Her family history includes Atrial fibrillation in her mother; Coronary artery disease in her father; Diabetes in her father; Heart disease in her mother; Thyroid disease in her mother  She  reports that she has never smoked  She has never used smokeless tobacco  She reports that she does not drink alcohol or use drugs    Current Outpatient Medications   Medication Sig Dispense Refill    desloratadine (CLARINEX) 5 MG tablet TAKE ONE TABLET BY MOUTH DAILY 30 tablet 1    fluticasone (FLONASE) 50 mcg/act nasal spray INSTILL TWO SPRAYS INTO EACH NOSTRIL DAILY 3 Bottle 1    ibuprofen (MOTRIN) 800 mg tablet Take 1 tablet (800 mg total) by mouth 3 (three) times a day Take with meals 21 tablet 0    montelukast (SINGULAIR) 10 mg tablet TAKE 1 TABLET BY MOUTH EVERYDAY AT BEDTIME 90 tablet 0    Omeprazole 20 MG TBDD Take 20 mg by mouth daily      pravastatin (PRAVACHOL) 10 mg tablet TAKE 1 TABLET BY MOUTH DAILY AT BEDTIME 30 tablet 1    predniSONE 10 mg tablet Take 5 tablets (50 mg total) by mouth daily for 2 days, THEN 4 tablets (40 mg total) daily for 2 days, THEN 3 tablets (30 mg total) daily for 2 days, THEN 2 tablets (20 mg total) daily for 2 days, THEN 1 tablet (10 mg total) daily for 2 days  30 tablet 0    benazepril (LOTENSIN) 20 mg tablet Take 1 tablet (20 mg total) by mouth daily 90 tablet 0     No current facility-administered medications for this visit  Current Outpatient Medications on File Prior to Visit   Medication Sig    desloratadine (CLARINEX) 5 MG tablet TAKE ONE TABLET BY MOUTH DAILY    fluticasone (FLONASE) 50 mcg/act nasal spray INSTILL TWO SPRAYS INTO EACH NOSTRIL DAILY    ibuprofen (MOTRIN) 800 mg tablet Take 1 tablet (800 mg total) by mouth 3 (three) times a day Take with meals    montelukast (SINGULAIR) 10 mg tablet TAKE 1 TABLET BY MOUTH EVERYDAY AT BEDTIME    Omeprazole 20 MG TBDD Take 20 mg by mouth daily    pravastatin (PRAVACHOL) 10 mg tablet TAKE 1 TABLET BY MOUTH DAILY AT BEDTIME    predniSONE 10 mg tablet Take 5 tablets (50 mg total) by mouth daily for 2 days, THEN 4 tablets (40 mg total) daily for 2 days, THEN 3 tablets (30 mg total) daily for 2 days, THEN 2 tablets (20 mg total) daily for 2 days, THEN 1 tablet (10 mg total) daily for 2 days   [DISCONTINUED] benazepril-hydrochlorthiazide (LOTENSIN HCT) 20-25 MG per tablet TAKE 1 TABLET BY MOUTH EVERY DAY     No current facility-administered medications on file prior to visit  She is allergic to penicillins       Review of Systems   Constitutional: Negative for fever  Respiratory: Negative for shortness of breath  Cardiovascular: Negative for chest pain  Objective:      /92 (BP Location: Left arm, Patient Position: Sitting, Cuff Size: Large)   Pulse 85   Temp (!) 97 2 °F (36 2 °C)   Ht 5' 8" (1 727 m)   Wt 131 kg (288 lb)   BMI 43 79 kg/m²          Physical Exam  Constitutional:       General: She is not in acute distress  Appearance: She is not ill-appearing  Pulmonary:      Effort: No respiratory distress     Musculoskeletal:        Feet:

## 2020-09-23 RX ORDER — BENAZEPRIL/HYDROCHLOROTHIAZIDE 20 MG-25MG
TABLET ORAL
Qty: 90 TABLET | Refills: 0 | OUTPATIENT
Start: 2020-09-23

## 2020-09-29 ENCOUNTER — TELEPHONE (OUTPATIENT)
Dept: FAMILY MEDICINE CLINIC | Facility: MEDICAL CENTER | Age: 60
End: 2020-09-29

## 2020-09-29 ENCOUNTER — APPOINTMENT (OUTPATIENT)
Dept: LAB | Facility: MEDICAL CENTER | Age: 60
End: 2020-09-29
Payer: MEDICARE

## 2020-09-29 DIAGNOSIS — M10.9 GOUT, UNSPECIFIED CAUSE, UNSPECIFIED CHRONICITY, UNSPECIFIED SITE: ICD-10-CM

## 2020-09-29 DIAGNOSIS — I10 ESSENTIAL HYPERTENSION: ICD-10-CM

## 2020-09-29 DIAGNOSIS — E11.9 TYPE 2 DIABETES MELLITUS WITHOUT COMPLICATION, WITHOUT LONG-TERM CURRENT USE OF INSULIN (HCC): ICD-10-CM

## 2020-09-29 DIAGNOSIS — M10.9 GOUT, UNSPECIFIED CAUSE, UNSPECIFIED CHRONICITY, UNSPECIFIED SITE: Primary | ICD-10-CM

## 2020-09-29 LAB
ALBUMIN SERPL BCP-MCNC: 3.6 G/DL (ref 3.5–5)
ALP SERPL-CCNC: 99 U/L (ref 46–116)
ALT SERPL W P-5'-P-CCNC: 38 U/L (ref 12–78)
AST SERPL W P-5'-P-CCNC: 24 U/L (ref 5–45)
BILIRUB DIRECT SERPL-MCNC: 0.2 MG/DL (ref 0–0.2)
BILIRUB SERPL-MCNC: 0.61 MG/DL (ref 0.2–1)
CHOLEST SERPL-MCNC: 155 MG/DL (ref 50–200)
HDLC SERPL-MCNC: 71 MG/DL
LDLC SERPL CALC-MCNC: 67 MG/DL (ref 0–100)
PROT SERPL-MCNC: 8 G/DL (ref 6.4–8.2)
TRIGL SERPL-MCNC: 86 MG/DL
URATE SERPL-MCNC: 7.7 MG/DL (ref 2–6.8)

## 2020-09-29 PROCEDURE — 36415 COLL VENOUS BLD VENIPUNCTURE: CPT

## 2020-09-29 PROCEDURE — 80076 HEPATIC FUNCTION PANEL: CPT

## 2020-09-29 PROCEDURE — 84550 ASSAY OF BLOOD/URIC ACID: CPT

## 2020-09-29 PROCEDURE — 80061 LIPID PANEL: CPT

## 2020-09-29 RX ORDER — BENAZEPRIL HYDROCHLORIDE 40 MG/1
40 TABLET, FILM COATED ORAL DAILY
Qty: 90 TABLET | Refills: 0 | Status: SHIPPED | OUTPATIENT
Start: 2020-09-29 | End: 2020-12-15 | Stop reason: SDUPTHER

## 2020-09-29 RX ORDER — ALLOPURINOL 100 MG/1
100 TABLET ORAL DAILY
Qty: 90 TABLET | Refills: 0 | Status: SHIPPED | OUTPATIENT
Start: 2020-09-29 | End: 2020-12-22

## 2020-09-29 NOTE — TELEPHONE ENCOUNTER
Allopurinol 100 mg daily faxed  Keep appointment for November at which time I will recheck uric acid levels

## 2020-09-29 NOTE — TELEPHONE ENCOUNTER
Pt called to ask if the Rxs could be sent to [facility] in [place] soon  She has someone who is going over to pick them up shortly

## 2020-09-29 NOTE — TELEPHONE ENCOUNTER
Blood pressures are elevated as suspected  I recommend increasing benazepril from 20 mg daily to 40 mg daily  If patient is agreeable I will fax in a new prescription and she can also double up on the 20 mg tablets to make 40 mg until she runs out of those

## 2020-09-30 DIAGNOSIS — R09.89 UPPER RESPIRATORY SYMPTOM: ICD-10-CM

## 2020-10-01 RX ORDER — DESLORATADINE 5 MG/1
TABLET ORAL
Qty: 30 TABLET | Refills: 1 | Status: SHIPPED | OUTPATIENT
Start: 2020-10-01 | End: 2020-11-30

## 2020-10-06 ENCOUNTER — TELEPHONE (OUTPATIENT)
Dept: FAMILY MEDICINE CLINIC | Facility: MEDICAL CENTER | Age: 60
End: 2020-10-06

## 2020-10-06 DIAGNOSIS — I10 ESSENTIAL HYPERTENSION: Primary | ICD-10-CM

## 2020-10-06 RX ORDER — AMLODIPINE BESYLATE 5 MG/1
5 TABLET ORAL DAILY
Qty: 90 TABLET | Refills: 0 | Status: SHIPPED | OUTPATIENT
Start: 2020-10-06 | End: 2020-10-16 | Stop reason: SDUPTHER

## 2020-10-08 DIAGNOSIS — E11.9 TYPE 2 DIABETES MELLITUS WITHOUT COMPLICATION, WITHOUT LONG-TERM CURRENT USE OF INSULIN (HCC): ICD-10-CM

## 2020-10-10 RX ORDER — PRAVASTATIN SODIUM 10 MG
TABLET ORAL
Qty: 30 TABLET | Refills: 1 | Status: SHIPPED | OUTPATIENT
Start: 2020-10-10 | End: 2020-11-05

## 2020-10-13 ENCOUNTER — TELEPHONE (OUTPATIENT)
Dept: FAMILY MEDICINE CLINIC | Facility: MEDICAL CENTER | Age: 60
End: 2020-10-13

## 2020-10-13 DIAGNOSIS — I10 ESSENTIAL HYPERTENSION: ICD-10-CM

## 2020-10-13 DIAGNOSIS — M79.89 LEG SWELLING: Primary | ICD-10-CM

## 2020-10-16 ENCOUNTER — APPOINTMENT (EMERGENCY)
Dept: RADIOLOGY | Facility: HOSPITAL | Age: 60
End: 2020-10-16
Payer: MEDICARE

## 2020-10-16 ENCOUNTER — APPOINTMENT (EMERGENCY)
Dept: CT IMAGING | Facility: HOSPITAL | Age: 60
End: 2020-10-16
Payer: MEDICARE

## 2020-10-16 ENCOUNTER — APPOINTMENT (EMERGENCY)
Dept: ULTRASOUND IMAGING | Facility: HOSPITAL | Age: 60
End: 2020-10-16
Payer: MEDICARE

## 2020-10-16 ENCOUNTER — HOSPITAL ENCOUNTER (EMERGENCY)
Facility: HOSPITAL | Age: 60
Discharge: HOME | End: 2020-10-16
Attending: EMERGENCY MEDICINE | Admitting: EMERGENCY MEDICINE
Payer: MEDICARE

## 2020-10-16 VITALS
HEART RATE: 99 BPM | TEMPERATURE: 97.1 F | RESPIRATION RATE: 17 BRPM | SYSTOLIC BLOOD PRESSURE: 127 MMHG | DIASTOLIC BLOOD PRESSURE: 73 MMHG | OXYGEN SATURATION: 95 %

## 2020-10-16 DIAGNOSIS — M79.606 LEG PAIN: ICD-10-CM

## 2020-10-16 DIAGNOSIS — M79.89 LEFT LEG SWELLING: Primary | ICD-10-CM

## 2020-10-16 LAB
ALBUMIN SERPL BCP-MCNC: 3.7 G/DL (ref 3.5–5)
ALP SERPL-CCNC: 102 U/L (ref 46–116)
ALT SERPL W P-5'-P-CCNC: 42 U/L (ref 12–78)
ANION GAP SERPL CALCULATED.3IONS-SCNC: 11 MMOL/L (ref 4–13)
APTT PPP: 33 SECONDS (ref 23–37)
AST SERPL W P-5'-P-CCNC: 40 U/L (ref 5–45)
ATRIAL RATE: 98 BPM
BASOPHILS # BLD AUTO: 0.08 THOUSANDS/ΜL (ref 0–0.1)
BASOPHILS NFR BLD AUTO: 1 % (ref 0–1)
BILIRUB SERPL-MCNC: 0.3 MG/DL (ref 0.2–1)
BUN SERPL-MCNC: 15 MG/DL (ref 5–25)
CALCIUM SERPL-MCNC: 9.2 MG/DL (ref 8.3–10.1)
CHLORIDE SERPL-SCNC: 102 MMOL/L (ref 100–108)
CO2 SERPL-SCNC: 26 MMOL/L (ref 21–32)
CREAT SERPL-MCNC: 1.04 MG/DL (ref 0.6–1.3)
EOSINOPHIL # BLD AUTO: 0.39 THOUSAND/ΜL (ref 0–0.61)
EOSINOPHIL NFR BLD AUTO: 4 % (ref 0–6)
ERYTHROCYTE [DISTWIDTH] IN BLOOD BY AUTOMATED COUNT: 13.4 % (ref 11.6–15.1)
GFR SERPL CREATININE-BSD FRML MDRD: 60 ML/MIN/1.73SQ M
GLUCOSE SERPL-MCNC: 96 MG/DL (ref 65–140)
HCT VFR BLD AUTO: 44.7 % (ref 34.8–46.1)
HGB BLD-MCNC: 14.4 G/DL (ref 11.5–15.4)
IMM GRANULOCYTES # BLD AUTO: 0.05 THOUSAND/UL (ref 0–0.2)
IMM GRANULOCYTES NFR BLD AUTO: 1 % (ref 0–2)
INR PPP: 1.05 (ref 0.84–1.19)
LYMPHOCYTES # BLD AUTO: 1.93 THOUSANDS/ΜL (ref 0.6–4.47)
LYMPHOCYTES NFR BLD AUTO: 19 % (ref 14–44)
MCH RBC QN AUTO: 29.3 PG (ref 26.8–34.3)
MCHC RBC AUTO-ENTMCNC: 32.2 G/DL (ref 31.4–37.4)
MCV RBC AUTO: 91 FL (ref 82–98)
MONOCYTES # BLD AUTO: 0.84 THOUSAND/ΜL (ref 0.17–1.22)
MONOCYTES NFR BLD AUTO: 8 % (ref 4–12)
NEUTROPHILS # BLD AUTO: 7.12 THOUSANDS/ΜL (ref 1.85–7.62)
NEUTS SEG NFR BLD AUTO: 67 % (ref 43–75)
NRBC BLD AUTO-RTO: 0 /100 WBCS
P AXIS: 76 DEGREES
PLATELET # BLD AUTO: 395 THOUSANDS/UL (ref 149–390)
PMV BLD AUTO: 10.6 FL (ref 8.9–12.7)
POTASSIUM SERPL-SCNC: 4.5 MMOL/L (ref 3.5–5.3)
PR INTERVAL: 134 MS
PROT SERPL-MCNC: 8.7 G/DL (ref 6.4–8.2)
PROTHROMBIN TIME: 14 SECONDS (ref 11.6–14.5)
QRS AXIS: 57 DEGREES
QRSD INTERVAL: 74 MS
QT INTERVAL: 356 MS
QTC INTERVAL: 454 MS
RBC # BLD AUTO: 4.92 MILLION/UL (ref 3.81–5.12)
SODIUM SERPL-SCNC: 139 MMOL/L (ref 136–145)
T WAVE AXIS: 31 DEGREES
TROPONIN I SERPL-MCNC: <0.02 NG/ML
VENTRICULAR RATE: 98 BPM
WBC # BLD AUTO: 10.41 THOUSAND/UL (ref 4.31–10.16)

## 2020-10-16 PROCEDURE — 96374 THER/PROPH/DIAG INJ IV PUSH: CPT

## 2020-10-16 PROCEDURE — 93005 ELECTROCARDIOGRAM TRACING: CPT

## 2020-10-16 PROCEDURE — 71045 X-RAY EXAM CHEST 1 VIEW: CPT

## 2020-10-16 PROCEDURE — 80053 COMPREHEN METABOLIC PANEL: CPT | Performed by: EMERGENCY MEDICINE

## 2020-10-16 PROCEDURE — 93971 EXTREMITY STUDY: CPT

## 2020-10-16 PROCEDURE — 99285 EMERGENCY DEPT VISIT HI MDM: CPT | Performed by: EMERGENCY MEDICINE

## 2020-10-16 PROCEDURE — 96361 HYDRATE IV INFUSION ADD-ON: CPT

## 2020-10-16 PROCEDURE — 85610 PROTHROMBIN TIME: CPT | Performed by: EMERGENCY MEDICINE

## 2020-10-16 PROCEDURE — 73564 X-RAY EXAM KNEE 4 OR MORE: CPT

## 2020-10-16 PROCEDURE — 84484 ASSAY OF TROPONIN QUANT: CPT | Performed by: EMERGENCY MEDICINE

## 2020-10-16 PROCEDURE — 71275 CT ANGIOGRAPHY CHEST: CPT

## 2020-10-16 PROCEDURE — 99284 EMERGENCY DEPT VISIT MOD MDM: CPT

## 2020-10-16 PROCEDURE — G1004 CDSM NDSC: HCPCS

## 2020-10-16 PROCEDURE — 93971 EXTREMITY STUDY: CPT | Performed by: SURGERY

## 2020-10-16 PROCEDURE — 85730 THROMBOPLASTIN TIME PARTIAL: CPT | Performed by: EMERGENCY MEDICINE

## 2020-10-16 PROCEDURE — 93010 ELECTROCARDIOGRAM REPORT: CPT | Performed by: INTERNAL MEDICINE

## 2020-10-16 PROCEDURE — 85025 COMPLETE CBC W/AUTO DIFF WBC: CPT | Performed by: EMERGENCY MEDICINE

## 2020-10-16 PROCEDURE — 36415 COLL VENOUS BLD VENIPUNCTURE: CPT | Performed by: EMERGENCY MEDICINE

## 2020-10-16 RX ORDER — MORPHINE SULFATE 4 MG/ML
4 INJECTION, SOLUTION INTRAMUSCULAR; INTRAVENOUS ONCE
Status: COMPLETED | OUTPATIENT
Start: 2020-10-16 | End: 2020-10-16

## 2020-10-16 RX ORDER — AMLODIPINE BESYLATE 10 MG/1
10 TABLET ORAL DAILY
Qty: 90 TABLET | Refills: 0 | Status: SHIPPED | OUTPATIENT
Start: 2020-10-16 | End: 2021-01-07

## 2020-10-16 RX ORDER — OXYCODONE HYDROCHLORIDE 5 MG/1
5 TABLET ORAL EVERY 4 HOURS PRN
Qty: 10 TABLET | Refills: 0 | Status: SHIPPED | OUTPATIENT
Start: 2020-10-16 | End: 2020-11-19 | Stop reason: ALTCHOICE

## 2020-10-16 RX ADMIN — MORPHINE SULFATE 4 MG: 4 INJECTION INTRAVENOUS at 16:19

## 2020-10-16 RX ADMIN — IOHEXOL 100 ML: 350 INJECTION, SOLUTION INTRAVENOUS at 16:52

## 2020-10-16 RX ADMIN — SODIUM CHLORIDE 1000 ML: 0.9 INJECTION, SOLUTION INTRAVENOUS at 16:16

## 2020-10-23 ENCOUNTER — TELEPHONE (OUTPATIENT)
Dept: FAMILY MEDICINE CLINIC | Facility: MEDICAL CENTER | Age: 60
End: 2020-10-23

## 2020-10-24 ENCOUNTER — HOSPITAL ENCOUNTER (EMERGENCY)
Facility: HOSPITAL | Age: 60
Discharge: HOME | End: 2020-10-24
Attending: EMERGENCY MEDICINE
Payer: MEDICARE

## 2020-10-24 VITALS
HEART RATE: 105 BPM | TEMPERATURE: 98.6 F | SYSTOLIC BLOOD PRESSURE: 138 MMHG | WEIGHT: 281.53 LBS | DIASTOLIC BLOOD PRESSURE: 71 MMHG | OXYGEN SATURATION: 98 % | HEIGHT: 68 IN | BODY MASS INDEX: 42.67 KG/M2 | RESPIRATION RATE: 20 BRPM

## 2020-10-24 DIAGNOSIS — M10.9 GOUT: Primary | ICD-10-CM

## 2020-10-24 PROCEDURE — 99284 EMERGENCY DEPT VISIT MOD MDM: CPT | Performed by: EMERGENCY MEDICINE

## 2020-10-24 PROCEDURE — 99283 EMERGENCY DEPT VISIT LOW MDM: CPT

## 2020-10-24 RX ORDER — PREDNISONE 20 MG/1
50 TABLET ORAL DAILY
Qty: 10 TABLET | Refills: 0 | Status: SHIPPED | OUTPATIENT
Start: 2020-10-24 | End: 2020-10-28

## 2020-10-24 RX ORDER — KETOROLAC TROMETHAMINE 30 MG/ML
15 INJECTION, SOLUTION INTRAMUSCULAR; INTRAVENOUS ONCE
Status: DISCONTINUED | OUTPATIENT
Start: 2020-10-24 | End: 2020-10-24

## 2020-10-24 RX ORDER — KETOROLAC TROMETHAMINE 30 MG/ML
15 INJECTION, SOLUTION INTRAMUSCULAR; INTRAVENOUS ONCE
Status: COMPLETED | OUTPATIENT
Start: 2020-10-24 | End: 2020-10-24

## 2020-10-24 RX ADMIN — PREDNISONE 50 MG: 20 TABLET ORAL at 08:22

## 2020-10-24 RX ADMIN — KETOROLAC TROMETHAMINE 15 MG: 30 INJECTION, SOLUTION INTRAMUSCULAR at 08:22

## 2020-10-26 ENCOUNTER — TELEPHONE (OUTPATIENT)
Dept: FAMILY MEDICINE CLINIC | Facility: MEDICAL CENTER | Age: 60
End: 2020-10-26

## 2020-11-05 DIAGNOSIS — E11.9 TYPE 2 DIABETES MELLITUS WITHOUT COMPLICATION, WITHOUT LONG-TERM CURRENT USE OF INSULIN (HCC): ICD-10-CM

## 2020-11-05 RX ORDER — PRAVASTATIN SODIUM 10 MG
TABLET ORAL
Qty: 30 TABLET | Refills: 1 | Status: SHIPPED | OUTPATIENT
Start: 2020-11-05 | End: 2020-11-30

## 2020-11-19 ENCOUNTER — OFFICE VISIT (OUTPATIENT)
Dept: FAMILY MEDICINE CLINIC | Facility: MEDICAL CENTER | Age: 60
End: 2020-11-19
Payer: MEDICARE

## 2020-11-19 ENCOUNTER — APPOINTMENT (OUTPATIENT)
Dept: RADIOLOGY | Facility: MEDICAL CENTER | Age: 60
End: 2020-11-19
Payer: MEDICARE

## 2020-11-19 ENCOUNTER — APPOINTMENT (OUTPATIENT)
Dept: LAB | Facility: MEDICAL CENTER | Age: 60
End: 2020-11-19
Payer: MEDICARE

## 2020-11-19 VITALS
SYSTOLIC BLOOD PRESSURE: 128 MMHG | TEMPERATURE: 96.8 F | HEART RATE: 86 BPM | RESPIRATION RATE: 18 BRPM | DIASTOLIC BLOOD PRESSURE: 78 MMHG | HEIGHT: 68 IN | WEIGHT: 277 LBS | BODY MASS INDEX: 41.98 KG/M2

## 2020-11-19 DIAGNOSIS — M21.619 BUNION: ICD-10-CM

## 2020-11-19 DIAGNOSIS — E11.9 TYPE 2 DIABETES MELLITUS WITHOUT COMPLICATION, WITHOUT LONG-TERM CURRENT USE OF INSULIN (HCC): Primary | ICD-10-CM

## 2020-11-19 DIAGNOSIS — I10 ESSENTIAL HYPERTENSION: ICD-10-CM

## 2020-11-19 DIAGNOSIS — J30.89 ENVIRONMENTAL AND SEASONAL ALLERGIES: ICD-10-CM

## 2020-11-19 DIAGNOSIS — M10.9 GOUT, UNSPECIFIED CAUSE, UNSPECIFIED CHRONICITY, UNSPECIFIED SITE: ICD-10-CM

## 2020-11-19 DIAGNOSIS — Z12.31 VISIT FOR SCREENING MAMMOGRAM: ICD-10-CM

## 2020-11-19 DIAGNOSIS — Z12.4 SCREENING FOR CERVICAL CANCER: ICD-10-CM

## 2020-11-19 LAB
SL AMB POCT HEMOGLOBIN AIC: 6.1 (ref ?–6.5)
URATE SERPL-MCNC: 5.9 MG/DL (ref 2–6.8)

## 2020-11-19 PROCEDURE — 36415 COLL VENOUS BLD VENIPUNCTURE: CPT

## 2020-11-19 PROCEDURE — 99214 OFFICE O/P EST MOD 30 MIN: CPT | Performed by: FAMILY MEDICINE

## 2020-11-19 PROCEDURE — 73630 X-RAY EXAM OF FOOT: CPT

## 2020-11-19 PROCEDURE — 84550 ASSAY OF BLOOD/URIC ACID: CPT

## 2020-11-19 PROCEDURE — 83036 HEMOGLOBIN GLYCOSYLATED A1C: CPT | Performed by: FAMILY MEDICINE

## 2020-11-30 DIAGNOSIS — R09.89 UPPER RESPIRATORY SYMPTOM: ICD-10-CM

## 2020-11-30 DIAGNOSIS — E11.9 TYPE 2 DIABETES MELLITUS WITHOUT COMPLICATION, WITHOUT LONG-TERM CURRENT USE OF INSULIN (HCC): ICD-10-CM

## 2020-11-30 RX ORDER — PRAVASTATIN SODIUM 10 MG
TABLET ORAL
Qty: 30 TABLET | Refills: 1 | Status: SHIPPED | OUTPATIENT
Start: 2020-11-30 | End: 2020-12-28

## 2020-11-30 RX ORDER — DESLORATADINE 5 MG/1
TABLET ORAL
Qty: 30 TABLET | Refills: 1 | Status: SHIPPED | OUTPATIENT
Start: 2020-11-30 | End: 2021-03-09 | Stop reason: SDUPTHER

## 2020-12-15 DIAGNOSIS — I10 ESSENTIAL HYPERTENSION: ICD-10-CM

## 2020-12-15 RX ORDER — BENAZEPRIL HYDROCHLORIDE 40 MG/1
40 TABLET, FILM COATED ORAL DAILY
Qty: 90 TABLET | Refills: 1 | Status: SHIPPED | OUTPATIENT
Start: 2020-12-15 | End: 2021-06-20

## 2020-12-15 RX ORDER — BENAZEPRIL HYDROCHLORIDE 20 MG/1
TABLET ORAL
Qty: 90 TABLET | Refills: 0 | OUTPATIENT
Start: 2020-12-15

## 2020-12-22 DIAGNOSIS — M10.9 GOUT, UNSPECIFIED CAUSE, UNSPECIFIED CHRONICITY, UNSPECIFIED SITE: ICD-10-CM

## 2020-12-22 DIAGNOSIS — J30.89 ENVIRONMENTAL AND SEASONAL ALLERGIES: ICD-10-CM

## 2020-12-22 RX ORDER — MONTELUKAST SODIUM 10 MG/1
TABLET ORAL
Qty: 90 TABLET | Refills: 0 | Status: SHIPPED | OUTPATIENT
Start: 2020-12-22 | End: 2021-03-09 | Stop reason: SDUPTHER

## 2020-12-22 RX ORDER — ALLOPURINOL 100 MG/1
TABLET ORAL
Qty: 90 TABLET | Refills: 0 | Status: SHIPPED | OUTPATIENT
Start: 2020-12-22 | End: 2021-03-21

## 2020-12-25 DIAGNOSIS — E11.9 TYPE 2 DIABETES MELLITUS WITHOUT COMPLICATION, WITHOUT LONG-TERM CURRENT USE OF INSULIN (HCC): ICD-10-CM

## 2020-12-28 RX ORDER — PRAVASTATIN SODIUM 10 MG
TABLET ORAL
Qty: 90 TABLET | Refills: 1 | Status: SHIPPED | OUTPATIENT
Start: 2020-12-28 | End: 2021-06-23

## 2021-01-02 DIAGNOSIS — I10 ESSENTIAL HYPERTENSION: ICD-10-CM

## 2021-01-05 RX ORDER — AMLODIPINE BESYLATE 5 MG/1
TABLET ORAL
Qty: 90 TABLET | Refills: 0 | OUTPATIENT
Start: 2021-01-05

## 2021-01-07 DIAGNOSIS — I10 ESSENTIAL HYPERTENSION: ICD-10-CM

## 2021-01-07 RX ORDER — AMLODIPINE BESYLATE 10 MG/1
TABLET ORAL
Qty: 90 TABLET | Refills: 0 | Status: SHIPPED | OUTPATIENT
Start: 2021-01-07 | End: 2021-04-06

## 2021-02-22 DIAGNOSIS — K21.9 GASTROESOPHAGEAL REFLUX DISEASE WITHOUT ESOPHAGITIS: Primary | ICD-10-CM

## 2021-02-22 RX ORDER — OMEPRAZOLE 20 MG/1
CAPSULE, DELAYED RELEASE ORAL
Qty: 180 CAPSULE | Refills: 1 | Status: SHIPPED | OUTPATIENT
Start: 2021-02-22 | End: 2021-08-18

## 2021-02-23 ENCOUNTER — TELEPHONE (OUTPATIENT)
Dept: FAMILY MEDICINE CLINIC | Facility: MEDICAL CENTER | Age: 61
End: 2021-02-23

## 2021-02-23 NOTE — TELEPHONE ENCOUNTER
Patient called back and stated, "When I'm ready, I'll go " she does not want us to schedule for her   FYI

## 2021-02-23 NOTE — TELEPHONE ENCOUNTER
Called pt to follow up on her open mammogram order  Left a message requesting a call back if she would like assistance with scheduling

## 2021-03-09 DIAGNOSIS — R09.89 UPPER RESPIRATORY SYMPTOM: ICD-10-CM

## 2021-03-09 DIAGNOSIS — J30.89 ENVIRONMENTAL AND SEASONAL ALLERGIES: ICD-10-CM

## 2021-03-09 RX ORDER — DESLORATADINE 5 MG/1
5 TABLET ORAL DAILY
Qty: 90 TABLET | Refills: 0 | Status: SHIPPED | OUTPATIENT
Start: 2021-03-09 | End: 2021-05-07

## 2021-03-09 RX ORDER — MONTELUKAST SODIUM 10 MG/1
10 TABLET ORAL
Qty: 90 TABLET | Refills: 0 | Status: SHIPPED | OUTPATIENT
Start: 2021-03-09 | End: 2021-06-11

## 2021-03-09 RX ORDER — FLUTICASONE PROPIONATE 50 MCG
2 SPRAY, SUSPENSION (ML) NASAL DAILY
Qty: 3 BOTTLE | Refills: 0 | Status: SHIPPED | OUTPATIENT
Start: 2021-03-09 | End: 2021-06-11

## 2021-03-21 DIAGNOSIS — M10.9 GOUT, UNSPECIFIED CAUSE, UNSPECIFIED CHRONICITY, UNSPECIFIED SITE: ICD-10-CM

## 2021-03-21 RX ORDER — ALLOPURINOL 100 MG/1
TABLET ORAL
Qty: 90 TABLET | Refills: 0 | Status: SHIPPED | OUTPATIENT
Start: 2021-03-21 | End: 2021-06-15

## 2021-04-06 DIAGNOSIS — I10 ESSENTIAL HYPERTENSION: ICD-10-CM

## 2021-04-06 RX ORDER — AMLODIPINE BESYLATE 10 MG/1
TABLET ORAL
Qty: 30 TABLET | Refills: 1 | Status: SHIPPED | OUTPATIENT
Start: 2021-04-06 | End: 2021-05-03

## 2021-05-03 DIAGNOSIS — I10 ESSENTIAL HYPERTENSION: ICD-10-CM

## 2021-05-03 RX ORDER — AMLODIPINE BESYLATE 10 MG/1
TABLET ORAL
Qty: 30 TABLET | Refills: 0 | Status: SHIPPED | OUTPATIENT
Start: 2021-05-03 | End: 2021-06-01

## 2021-05-06 DIAGNOSIS — R09.89 UPPER RESPIRATORY SYMPTOM: ICD-10-CM

## 2021-05-07 RX ORDER — DESLORATADINE 5 MG/1
TABLET ORAL
Qty: 90 TABLET | Refills: 0 | Status: SHIPPED | OUTPATIENT
Start: 2021-05-07 | End: 2022-02-11 | Stop reason: SDUPTHER

## 2021-05-29 DIAGNOSIS — I10 ESSENTIAL HYPERTENSION: ICD-10-CM

## 2021-06-01 RX ORDER — AMLODIPINE BESYLATE 10 MG/1
TABLET ORAL
Qty: 30 TABLET | Refills: 0 | Status: SHIPPED | OUTPATIENT
Start: 2021-06-01 | End: 2021-06-29

## 2021-06-10 DIAGNOSIS — J30.89 ENVIRONMENTAL AND SEASONAL ALLERGIES: ICD-10-CM

## 2021-06-10 DIAGNOSIS — R09.89 UPPER RESPIRATORY SYMPTOM: ICD-10-CM

## 2021-06-11 RX ORDER — FLUTICASONE PROPIONATE 50 MCG
SPRAY, SUSPENSION (ML) NASAL
Qty: 16 G | Refills: 0 | Status: SHIPPED | OUTPATIENT
Start: 2021-06-11 | End: 2022-02-11 | Stop reason: SDUPTHER

## 2021-06-11 RX ORDER — MONTELUKAST SODIUM 10 MG/1
TABLET ORAL
Qty: 30 TABLET | Refills: 0 | Status: SHIPPED | OUTPATIENT
Start: 2021-06-11 | End: 2022-02-11 | Stop reason: SDUPTHER

## 2021-06-15 DIAGNOSIS — M10.9 GOUT, UNSPECIFIED CAUSE, UNSPECIFIED CHRONICITY, UNSPECIFIED SITE: ICD-10-CM

## 2021-06-15 RX ORDER — ALLOPURINOL 100 MG/1
TABLET ORAL
Qty: 30 TABLET | Refills: 0 | Status: SHIPPED | OUTPATIENT
Start: 2021-06-15

## 2021-06-23 ENCOUNTER — OFFICE VISIT (OUTPATIENT)
Dept: FAMILY MEDICINE CLINIC | Facility: MEDICAL CENTER | Age: 61
End: 2021-06-23
Payer: MEDICARE

## 2021-06-23 VITALS
TEMPERATURE: 97.4 F | BODY MASS INDEX: 40.92 KG/M2 | SYSTOLIC BLOOD PRESSURE: 132 MMHG | WEIGHT: 270 LBS | DIASTOLIC BLOOD PRESSURE: 76 MMHG | HEART RATE: 76 BPM | HEIGHT: 68 IN | RESPIRATION RATE: 16 BRPM

## 2021-06-23 DIAGNOSIS — E66.01 MORBID OBESITY WITH BMI OF 40.0-44.9, ADULT (HCC): ICD-10-CM

## 2021-06-23 DIAGNOSIS — M10.9 GOUT, UNSPECIFIED CAUSE, UNSPECIFIED CHRONICITY, UNSPECIFIED SITE: ICD-10-CM

## 2021-06-23 DIAGNOSIS — E11.9 TYPE 2 DIABETES MELLITUS WITHOUT COMPLICATION, WITHOUT LONG-TERM CURRENT USE OF INSULIN (HCC): Primary | ICD-10-CM

## 2021-06-23 DIAGNOSIS — Z00.00 MEDICARE ANNUAL WELLNESS VISIT, SUBSEQUENT: ICD-10-CM

## 2021-06-23 DIAGNOSIS — E11.9 TYPE 2 DIABETES MELLITUS WITHOUT COMPLICATION, WITHOUT LONG-TERM CURRENT USE OF INSULIN (HCC): ICD-10-CM

## 2021-06-23 DIAGNOSIS — I10 ESSENTIAL HYPERTENSION: ICD-10-CM

## 2021-06-23 DIAGNOSIS — J30.89 ENVIRONMENTAL AND SEASONAL ALLERGIES: ICD-10-CM

## 2021-06-23 LAB — SL AMB POCT HEMOGLOBIN AIC: 6.1 (ref ?–6.5)

## 2021-06-23 PROCEDURE — 99214 OFFICE O/P EST MOD 30 MIN: CPT | Performed by: FAMILY MEDICINE

## 2021-06-23 PROCEDURE — G0439 PPPS, SUBSEQ VISIT: HCPCS | Performed by: FAMILY MEDICINE

## 2021-06-23 PROCEDURE — 83036 HEMOGLOBIN GLYCOSYLATED A1C: CPT | Performed by: FAMILY MEDICINE

## 2021-06-23 RX ORDER — PRAVASTATIN SODIUM 10 MG
TABLET ORAL
Qty: 90 TABLET | Refills: 1 | Status: SHIPPED | OUTPATIENT
Start: 2021-06-23 | End: 2021-10-12

## 2021-06-23 NOTE — PROGRESS NOTES
Assessment and Plan:     Problem List Items Addressed This Visit        Endocrine    Diabetes ([place] [address] ) - Primary    Relevant Orders    POCT hemoglobin A1c (Completed)    Lipid panel    LDL cholesterol, direct    Comprehensive metabolic panel    UA (URINE) with reflex to Scope    Microalbumin / creatinine urine ratio       Cardiovascular and Mediastinum    HTN (hypertension)    Relevant Orders    Comprehensive metabolic panel    UA (URINE) with reflex to Scope    Microalbumin / creatinine urine ratio       Other    Environmental and seasonal allergies    Gout    Relevant Orders    Uric acid      Other Visit Diagnoses     Morbid obesity with BMI of 40 0-44 9, adult ([place] [address] )        Medicare annual wellness visit, subsequent            BMI Counseling: Body mass index is 41 05 kg/m²  The BMI is above normal  Nutrition recommendations include encouraging healthy choices of fruits and vegetables  Exercise recommendations include moderate physical activity 150 minutes/week  Preventive health issues were discussed with patient, and age appropriate screening tests were ordered as noted in patient's After Visit Summary  Personalized health advice and appropriate referrals for health education or preventive services given if needed, as noted in patient's After Visit Summary  Follow-up in six months or sooner if needed           History of Present Illness:     Patient presents for Medicare Annual Wellness visit    Patient Care Team:  Sher Fu DO as PCP - General (Family Medicine)  Penny Scott MD (Gastroenterology)     Problem List:     Patient Active Problem List   Diagnosis    Cervical spondylosis    HTN (hypertension)    GERD (gastroesophageal reflux disease)    Morbid obesity ([place] [address] )    Environmental and seasonal allergies    Diabetes ([place] [address] )    Gout      Past Medical and Surgical History:     Past Medical History:   Diagnosis Date    Diabetes mellitus ([place] [address] )     Fatty liver     GERD (gastroesophageal reflux disease)     Hypertension      Past Surgical History:   Procedure Laterality Date    BACK SURGERY      FINGER SURGERY      SINUS SURGERY      TUBAL LIGATION        Family History:     Family History   Problem Relation Age of Onset    Heart disease Mother     Atrial fibrillation Mother     Thyroid disease Mother     Coronary artery disease Father     Diabetes Father       Social History:     Social History     Socioeconomic History    Marital status: Single     Spouse name: None    Number of children: None    Years of education: None    Highest education level: None   Occupational History    None   Tobacco Use    Smoking status: Never Smoker    Smokeless tobacco: Never Used   Vaping Use    Vaping Use: Never used   Substance and Sexual Activity    Alcohol use: Never    Drug use: Never    Sexual activity: None   Other Topics Concern    None   Social History Narrative    None     Social Determinants of Health     Financial Resource Strain:     Difficulty of Paying Living Expenses:    Food Insecurity:     Worried About Running Out of Food in the Last Year:     Ran Out of Food in the Last Year:    Transportation Needs:     Lack of Transportation (Medical):      Lack of Transportation (Non-Medical):    Physical Activity:     Days of Exercise per Week:     Minutes of Exercise per Session:    Stress:     Feeling of Stress :    Social Connections:     Frequency of Communication with Friends and Family:     Frequency of Social Gatherings with Friends and Family:     Attends Congregation Services:     Active Member of Clubs or Organizations:     Attends Club or Organization Meetings:     Marital Status:    Intimate Partner Violence:     Fear of Current or Ex-Partner:     Emotionally Abused:     Physically Abused:     Sexually Abused:       Medications and Allergies:     Current Outpatient Medications   Medication Sig Dispense Refill    allopurinol (ZYLOPRIM) 100 mg tablet TAKE 1 TABLET BY MOUTH EVERY DAY (Patient taking differently: Take 300 mg by mouth daily ) 30 tablet 0    amLODIPine (NORVASC) 10 mg tablet TAKE 1 TABLET BY MOUTH EVERY DAY 30 tablet 0    benazepril (LOTENSIN) 40 MG tablet TAKE 1 TABLET BY MOUTH EVERY DAY 30 tablet 0    desloratadine (CLARINEX) 5 MG tablet TAKE ONE TABLET BY MOUTH DAILY 90 tablet 0    fluticasone (FLONASE) 50 mcg/act nasal spray INSTILL TWO SPRAYS INTO EACH NOSTRIL DAILY 16 g 0    ibuprofen (MOTRIN) 800 mg tablet Take 1 tablet (800 mg total) by mouth 3 (three) times a day Take with meals 21 tablet 0    montelukast (SINGULAIR) 10 mg tablet TAKE ONE TABLET BY MOUTH DAILY AT BEDTIME 30 tablet 0    omeprazole (PriLOSEC) 20 mg delayed release capsule TAKE ONE CAPSULE 1/2 HOUR BEFORE BREAKFAST AND ONE CAPSULE 1/2 HOUR BEFORE DINNER 180 capsule 1    pravastatin (PRAVACHOL) 10 mg tablet TAKE 1 TABLET BY MOUTH DAILY AT BEDTIME 90 tablet 1     No current facility-administered medications for this visit  Allergies   Allergen Reactions    Penicillins       Immunizations:     Immunization History   Administered Date(s) Administered    INFLUENZA 10/17/2017, 10/19/2018    Influenza, recombinant, quadrivalent,injectable, preservative free 09/22/2020    Pneumococcal Polysaccharide PPV23 09/22/2020      Health Maintenance:         Topic Date Due    MAMMOGRAM  Never done    HIV Screening  Never done    Cervical Cancer Screening  Never done    Colorectal Cancer Screening  11/01/2022    Hepatitis C Screening  Completed         Topic Date Due    COVID-19 Vaccine (1) Never done    DTaP,Tdap,and Td Vaccines (1 - Tdap) Never done      Medicare Health Risk Assessment:     /76 (BP Location: Left arm, Patient Position: Sitting, Cuff Size: Large)   Pulse 76   Temp (!) 97 4 °F (36 3 °C)   Resp 16   Ht 5' 8" (1 727 m)   Wt 122 kg (270 lb)   BMI 41 05 kg/m²      Todd Sean is here for her Subsequent Wellness visit       Health Risk Assessment:   Patient rates overall health as good  Patient feels that their physical health rating is same  Patient is very satisfied with their life  Eyesight was rated as same  Hearing was rated as same  Patient feels that their emotional and mental health rating is same  Patients states they are never, rarely angry  Patient states they are never, rarely unusually tired/fatigued  Pain experienced in the last 7 days has been none  Patient states that she has experienced no weight loss or gain in last 6 months  Depression Screening:   PHQ-2 Score: 0      Fall Risk Screening: In the past year, patient has experienced: no history of falling in past year      Urinary Incontinence Screening:   Patient has not leaked urine accidently in the last six months  Home Safety:  Patient does not have trouble with stairs inside or outside of their home  Patient has working smoke alarms and has working carbon monoxide detector  Home safety hazards include: none  Nutrition:   Current diet is Regular and Diabetic  Medications:   Patient is currently taking over-the-counter supplements  OTC medications include: see medication list  Patient is able to manage medications  Activities of Daily Living (ADLs)/Instrumental Activities of Daily Living (IADLs):   Walk and transfer into and out of bed and chair?: Yes  Dress and groom yourself?: Yes    Bathe or shower yourself?: Yes    Feed yourself?  Yes  Do your laundry/housekeeping?: Yes  Manage your money, pay your bills and track your expenses?: Yes  Make your own meals?: Yes    Do your own shopping?: Yes    Previous Hospitalizations:   Any hospitalizations or ED visits within the last 12 months?: No      Advance Care Planning:   Living will: No    Durable POA for healthcare: No    Advanced directive: No    Advanced directive counseling given: Yes    Patient declined ACP directive: Yes      Cognitive Screening:   Provider or family/friend/caregiver concerned regarding cognition?: No    PREVENTIVE SCREENINGS      Cardiovascular Screening:    General: Screening Current      Diabetes Screening:     General: Screening Not Indicated, History Diabetes and Screening Current      Colorectal Cancer Screening:     General: Screening Current      Breast Cancer Screening:     General: Patient Declines      Cervical Cancer Screening:    General: Patient Declines      Osteoporosis Screening:    General: Screening Not Indicated      Abdominal Aortic Aneurysm (AAA) Screening:        General: Screening Not Indicated      Lung Cancer Screening:     General: Screening Not Indicated      Hepatitis C Screening:    General: Screening Current    Screening, Brief Intervention, and Referral to Treatment (SBIRT)    Screening      AUDIT-C Screenin) How often did you have a drink containing alcohol in the past year? never  2) How many drinks did you have on a typical day when you were drinking in the past year? 0  3) How often did you have 6 or more drinks on one occasion in the past year? never    AUDIT-C Score: 0  Interpretation: Score 0-2 (female): Negative screen for alcohol misuse    Single Item Drug Screening:  How often have you used an illegal drug (including marijuana) or a prescription medication for non-medical reasons in the past year? never    Single Item Drug Screen Score: 0  Interpretation: Negative screen for possible drug use disorder      Sher Fu, DO

## 2021-06-23 NOTE — PROGRESS NOTES
Assessment/Plan:       Diagnoses and all orders for this visit:    Type 2 diabetes mellitus without complication, without long-term current use of insulin ([facility])  -     POCT hemoglobin A1c  -     Lipid panel; Future  -     LDL cholesterol, direct; Future  -     Comprehensive metabolic panel; Future  -     UA (URINE) with reflex to Scope  -     Microalbumin / creatinine urine ratio  In office A1c today was 6 1%  Diabetes therefore well controlled with diet alone  Continue pravastatin for cardiovascular protection  Check labs to monitor lipid levels, hepatic function and renal function  Essential hypertension  -     Comprehensive metabolic panel; Future  -     UA (URINE) with reflex to Scope  -     Microalbumin / creatinine urine ratio  Blood pressure controlled  Continue amlodipine and benazepril  Check labs to monitor renal function  Environmental and seasonal allergies  Allergies are controlled  Continue Clarinex, Flonase and Singulair  Gout, unspecified cause, unspecified chronicity, unspecified site  -     Uric acid; Future  Allopurinol dose has been increased by Podiatry  Check uric acid level  Morbid obesity with BMI of 40 0-44 9, adult ([facility])  BMI Counseling: Body mass index is 41 05 kg/m²  The BMI is above normal  Nutrition recommendations include decreasing overall calorie intake  Exercise recommendations include moderate aerobic physical activity for 150 minutes/week  Follow-up in six months or sooner if needed  Subjective:      Patient ID: Erica Villareal is a 64 y o  female  Patient presents for follow-up  She has type 2 diabetes  Not on any treatment for her diabetes as she is diet controlled  She is however on pravastatin for cardiovascular protection  Tolerating medication well  She has hypertension  Current treatment includes amlodipine and benazepril  Tolerating medication well  She has allergies    Current treatment includes Clarinex, Flonase and Singulair  Medications work well to keep her allergies controlled  She has gout  Currently on allopurinol  Her podiatrist increase the dose to 300 mg daily  The following portions of the patient's history were reviewed and updated as appropriate: She  has a past medical history of Diabetes mellitus ([facility] ), Fatty liver, GERD (gastroesophageal reflux disease), and Hypertension  She   Patient Active Problem List    Diagnosis Date Noted    Gout 09/22/2020    Diabetes ([facility] ) 05/20/2020    Environmental and seasonal allergies 05/06/2020    HTN (hypertension) 03/09/2020    GERD (gastroesophageal reflux disease) 03/09/2020    Morbid obesity ([place] [address] ) 03/09/2020    Cervical spondylosis 06/19/2014     She  has a past surgical history that includes Back surgery; Finger surgery; Tubal ligation; and Sinus surgery  Her family history includes Atrial fibrillation in her mother; Coronary artery disease in her father; Diabetes in her father; Heart disease in her mother; Thyroid disease in her mother  She  reports that she has never smoked  She has never used smokeless tobacco  She reports that she does not drink alcohol and does not use drugs    Current Outpatient Medications   Medication Sig Dispense Refill    allopurinol (ZYLOPRIM) 100 mg tablet TAKE 1 TABLET BY MOUTH EVERY DAY (Patient taking differently: Take 300 mg by mouth daily ) 30 tablet 0    amLODIPine (NORVASC) 10 mg tablet TAKE 1 TABLET BY MOUTH EVERY DAY 30 tablet 0    benazepril (LOTENSIN) 40 MG tablet TAKE 1 TABLET BY MOUTH EVERY DAY 30 tablet 0    desloratadine (CLARINEX) 5 MG tablet TAKE ONE TABLET BY MOUTH DAILY 90 tablet 0    fluticasone (FLONASE) 50 mcg/act nasal spray INSTILL TWO SPRAYS INTO EACH NOSTRIL DAILY 16 g 0    ibuprofen (MOTRIN) 800 mg tablet Take 1 tablet (800 mg total) by mouth 3 (three) times a day Take with meals 21 tablet 0    montelukast (SINGULAIR) 10 mg tablet TAKE ONE TABLET BY MOUTH DAILY AT BEDTIME 30 tablet 0    omeprazole (PriLOSEC) 20 mg delayed release capsule TAKE ONE CAPSULE 1/2 HOUR BEFORE BREAKFAST AND ONE CAPSULE 1/2 HOUR BEFORE DINNER 180 capsule 1    pravastatin (PRAVACHOL) 10 mg tablet TAKE 1 TABLET BY MOUTH DAILY AT BEDTIME 90 tablet 1     No current facility-administered medications for this visit  Current Outpatient Medications on File Prior to Visit   Medication Sig    allopurinol (ZYLOPRIM) 100 mg tablet TAKE 1 TABLET BY MOUTH EVERY DAY (Patient taking differently: Take 300 mg by mouth daily )    amLODIPine (NORVASC) 10 mg tablet TAKE 1 TABLET BY MOUTH EVERY DAY    benazepril (LOTENSIN) 40 MG tablet TAKE 1 TABLET BY MOUTH EVERY DAY    desloratadine (CLARINEX) 5 MG tablet TAKE ONE TABLET BY MOUTH DAILY    fluticasone (FLONASE) 50 mcg/act nasal spray INSTILL TWO SPRAYS INTO EACH NOSTRIL DAILY    ibuprofen (MOTRIN) 800 mg tablet Take 1 tablet (800 mg total) by mouth 3 (three) times a day Take with meals    montelukast (SINGULAIR) 10 mg tablet TAKE ONE TABLET BY MOUTH DAILY AT BEDTIME    omeprazole (PriLOSEC) 20 mg delayed release capsule TAKE ONE CAPSULE 1/2 HOUR BEFORE BREAKFAST AND ONE CAPSULE 1/2 HOUR BEFORE DINNER    pravastatin (PRAVACHOL) 10 mg tablet TAKE 1 TABLET BY MOUTH DAILY AT BEDTIME    [DISCONTINUED] Omeprazole 20 MG TBDD Take 20 mg by mouth daily (Patient not taking: Reported on 6/23/2021)     No current facility-administered medications on file prior to visit  She is allergic to penicillins       Review of Systems   Constitutional: Negative for fever  Respiratory: Negative for shortness of breath  Cardiovascular: Negative for chest pain  Objective:      /76 (BP Location: Left arm, Patient Position: Sitting, Cuff Size: Large)   Pulse 76   Temp (!) 97 4 °F (36 3 °C)   Resp 16   Ht 5' 8" (1 727 m)   Wt 122 kg (270 lb)   BMI 41 05 kg/m²          Physical Exam  Constitutional:       General: She is not in acute distress       Appearance: She is obese  She is not ill-appearing  Cardiovascular:      Rate and Rhythm: Normal rate and regular rhythm  Pulses: no weak pulses          Dorsalis pedis pulses are 2+ on the right side and 2+ on the left side  Posterior tibial pulses are 2+ on the right side and 2+ on the left side  Heart sounds: Normal heart sounds  Pulmonary:      Effort: Pulmonary effort is normal       Breath sounds: Normal breath sounds  Feet:      Right foot:      Skin integrity: No ulcer, skin breakdown, erythema, warmth, callus or dry skin  Left foot:      Skin integrity: No ulcer, skin breakdown, erythema, warmth, callus or dry skin  Diabetic Foot Exam    Patient's shoes and socks removed  Right Foot/Ankle   Right Foot Inspection  Skin Exam: skin normal and skin intact no dry skin, no warmth, no callus, no erythema, no maceration, no abnormal color, no pre-ulcer, no ulcer and no callus                          Toe Exam: ROM and strength within normal limits  Sensory   Vibration: intact    Monofilament testing: intact  Vascular  Capillary refills: < 3 seconds  The right DP pulse is 2+  The right PT pulse is 2+  Left Foot/Ankle  Left Foot Inspection  Skin Exam: skin normal and skin intactno dry skin, no warmth, no erythema, no maceration, normal color, no pre-ulcer, no ulcer and no callus                         Toe Exam: ROM and strength within normal limits                   Sensory   Vibration: intact    Monofilament: intact  Vascular  Capillary refills: < 3 seconds  The left DP pulse is 2+  The left PT pulse is 2+  Assign Risk Category:  No deformity present; No loss of protective sensation;  No weak pulses       Risk: 0

## 2021-07-09 ENCOUNTER — HOSPITAL ENCOUNTER (OUTPATIENT)
Dept: MRI IMAGING | Facility: HOSPITAL | Age: 61
Discharge: HOME | End: 2021-07-09
Attending: PODIATRIST
Payer: MEDICARE

## 2021-07-09 DIAGNOSIS — M79.675 PAIN AND SWELLING OF TOE OF LEFT FOOT: ICD-10-CM

## 2021-07-09 DIAGNOSIS — M79.89 PAIN AND SWELLING OF TOE OF LEFT FOOT: ICD-10-CM

## 2021-07-09 PROCEDURE — 73718 MRI LOWER EXTREMITY W/O DYE: CPT

## 2021-07-09 PROCEDURE — G1004 CDSM NDSC: HCPCS

## 2021-08-17 ENCOUNTER — APPOINTMENT (OUTPATIENT)
Dept: LAB | Facility: MEDICAL CENTER | Age: 61
End: 2021-08-17
Payer: MEDICARE

## 2021-08-17 DIAGNOSIS — E11.9 TYPE 2 DIABETES MELLITUS WITHOUT COMPLICATION, WITHOUT LONG-TERM CURRENT USE OF INSULIN (HCC): ICD-10-CM

## 2021-08-17 DIAGNOSIS — M10.9 GOUT, UNSPECIFIED CAUSE, UNSPECIFIED CHRONICITY, UNSPECIFIED SITE: ICD-10-CM

## 2021-08-17 DIAGNOSIS — I10 ESSENTIAL HYPERTENSION: ICD-10-CM

## 2021-08-17 LAB
ALBUMIN SERPL BCP-MCNC: 3.5 G/DL (ref 3.5–5)
ALP SERPL-CCNC: 115 U/L (ref 46–116)
ALT SERPL W P-5'-P-CCNC: 28 U/L (ref 12–78)
ANION GAP SERPL CALCULATED.3IONS-SCNC: 7 MMOL/L (ref 4–13)
AST SERPL W P-5'-P-CCNC: 23 U/L (ref 5–45)
BILIRUB SERPL-MCNC: 0.4 MG/DL (ref 0.2–1)
BILIRUB UR QL STRIP: NEGATIVE
BUN SERPL-MCNC: 23 MG/DL (ref 5–25)
CALCIUM SERPL-MCNC: 9.2 MG/DL (ref 8.3–10.1)
CHLORIDE SERPL-SCNC: 103 MMOL/L (ref 100–108)
CHOLEST SERPL-MCNC: 142 MG/DL (ref 50–200)
CLARITY UR: CLEAR
CO2 SERPL-SCNC: 27 MMOL/L (ref 21–32)
COLOR UR: YELLOW
CREAT SERPL-MCNC: 0.74 MG/DL (ref 0.6–1.3)
CREAT UR-MCNC: 196 MG/DL
GFR SERPL CREATININE-BSD FRML MDRD: 90 ML/MIN/1.73SQ M
GLUCOSE P FAST SERPL-MCNC: 117 MG/DL (ref 65–99)
GLUCOSE UR STRIP-MCNC: NEGATIVE MG/DL
HDLC SERPL-MCNC: 63 MG/DL
HGB UR QL STRIP.AUTO: NEGATIVE
KETONES UR STRIP-MCNC: NEGATIVE MG/DL
LDLC SERPL CALC-MCNC: 59 MG/DL (ref 0–100)
LDLC SERPL DIRECT ASSAY-MCNC: 63 MG/DL (ref 0–100)
LEUKOCYTE ESTERASE UR QL STRIP: NEGATIVE
MICROALBUMIN UR-MCNC: 9.4 MG/L (ref 0–20)
MICROALBUMIN/CREAT 24H UR: 5 MG/G CREATININE (ref 0–30)
NITRITE UR QL STRIP: NEGATIVE
NONHDLC SERPL-MCNC: 79 MG/DL
PH UR STRIP.AUTO: 5.5 [PH]
POTASSIUM SERPL-SCNC: 4.4 MMOL/L (ref 3.5–5.3)
PROT SERPL-MCNC: 8.1 G/DL (ref 6.4–8.2)
PROT UR STRIP-MCNC: NEGATIVE MG/DL
SODIUM SERPL-SCNC: 137 MMOL/L (ref 136–145)
SP GR UR STRIP.AUTO: 1.03 (ref 1–1.03)
TRIGL SERPL-MCNC: 100 MG/DL
URATE SERPL-MCNC: 4.8 MG/DL (ref 2–6.8)
UROBILINOGEN UR QL STRIP.AUTO: 0.2 E.U./DL

## 2021-08-17 PROCEDURE — 36415 COLL VENOUS BLD VENIPUNCTURE: CPT

## 2021-08-17 PROCEDURE — 83721 ASSAY OF BLOOD LIPOPROTEIN: CPT

## 2021-08-17 PROCEDURE — 84550 ASSAY OF BLOOD/URIC ACID: CPT

## 2021-08-17 PROCEDURE — 82570 ASSAY OF URINE CREATININE: CPT | Performed by: FAMILY MEDICINE

## 2021-08-17 PROCEDURE — 80061 LIPID PANEL: CPT

## 2021-08-17 PROCEDURE — 82043 UR ALBUMIN QUANTITATIVE: CPT | Performed by: FAMILY MEDICINE

## 2021-08-17 PROCEDURE — 80053 COMPREHEN METABOLIC PANEL: CPT

## 2021-08-17 PROCEDURE — 81003 URINALYSIS AUTO W/O SCOPE: CPT | Performed by: FAMILY MEDICINE

## 2021-08-18 DIAGNOSIS — K21.9 GASTROESOPHAGEAL REFLUX DISEASE WITHOUT ESOPHAGITIS: ICD-10-CM

## 2021-08-18 RX ORDER — OMEPRAZOLE 20 MG/1
CAPSULE, DELAYED RELEASE ORAL
Qty: 180 CAPSULE | Refills: 1 | Status: SHIPPED | OUTPATIENT
Start: 2021-08-18 | End: 2022-02-11

## 2021-08-19 ENCOUNTER — TELEPHONE (OUTPATIENT)
Dept: FAMILY MEDICINE CLINIC | Facility: MEDICAL CENTER | Age: 61
End: 2021-08-19

## 2021-10-12 DIAGNOSIS — I10 ESSENTIAL HYPERTENSION: ICD-10-CM

## 2021-10-12 DIAGNOSIS — E11.9 TYPE 2 DIABETES MELLITUS WITHOUT COMPLICATION, WITHOUT LONG-TERM CURRENT USE OF INSULIN (HCC): ICD-10-CM

## 2021-10-12 RX ORDER — PRAVASTATIN SODIUM 10 MG
TABLET ORAL
Qty: 90 TABLET | Refills: 1 | Status: SHIPPED | OUTPATIENT
Start: 2021-10-12 | End: 2022-05-31

## 2021-10-12 RX ORDER — AMLODIPINE BESYLATE 10 MG/1
TABLET ORAL
Qty: 90 TABLET | Refills: 1 | Status: SHIPPED | OUTPATIENT
Start: 2021-10-12 | End: 2022-06-01

## 2021-11-10 ENCOUNTER — TELEPHONE (OUTPATIENT)
Dept: FAMILY MEDICINE CLINIC | Facility: MEDICAL CENTER | Age: 61
End: 2021-11-10

## 2021-11-10 DIAGNOSIS — I10 ESSENTIAL HYPERTENSION: ICD-10-CM

## 2021-11-10 DIAGNOSIS — Z20.822 SUSPECTED COVID-19 VIRUS INFECTION: Primary | ICD-10-CM

## 2021-11-10 PROCEDURE — U0003 INFECTIOUS AGENT DETECTION BY NUCLEIC ACID (DNA OR RNA); SEVERE ACUTE RESPIRATORY SYNDROME CORONAVIRUS 2 (SARS-COV-2) (CORONAVIRUS DISEASE [COVID-19]), AMPLIFIED PROBE TECHNIQUE, MAKING USE OF HIGH THROUGHPUT TECHNOLOGIES AS DESCRIBED BY CMS-2020-01-R: HCPCS | Performed by: FAMILY MEDICINE

## 2021-11-10 PROCEDURE — U0005 INFEC AGEN DETEC AMPLI PROBE: HCPCS | Performed by: FAMILY MEDICINE

## 2021-11-10 RX ORDER — BENAZEPRIL HYDROCHLORIDE 40 MG/1
TABLET, FILM COATED ORAL
Qty: 90 TABLET | Refills: 1 | Status: SHIPPED | OUTPATIENT
Start: 2021-11-10 | End: 2022-06-24

## 2021-11-12 ENCOUNTER — TELEPHONE (OUTPATIENT)
Dept: FAMILY MEDICINE CLINIC | Facility: MEDICAL CENTER | Age: 61
End: 2021-11-12

## 2021-11-21 ENCOUNTER — NURSE TRIAGE (OUTPATIENT)
Dept: OTHER | Facility: OTHER | Age: 61
End: 2021-11-21

## 2021-11-21 DIAGNOSIS — R05.8 PRODUCTIVE COUGH: Primary | ICD-10-CM

## 2021-11-21 RX ORDER — AZITHROMYCIN 250 MG/1
TABLET, FILM COATED ORAL
Qty: 6 TABLET | Refills: 0 | Status: SHIPPED | OUTPATIENT
Start: 2021-11-21 | End: 2021-11-25

## 2021-11-26 ENCOUNTER — TELEPHONE (OUTPATIENT)
Dept: FAMILY MEDICINE CLINIC | Facility: MEDICAL CENTER | Age: 61
End: 2021-11-26

## 2022-01-31 ENCOUNTER — RA CDI HCC (OUTPATIENT)
Dept: OTHER | Facility: HOSPITAL | Age: 62
End: 2022-01-31

## 2022-02-11 ENCOUNTER — OFFICE VISIT (OUTPATIENT)
Dept: FAMILY MEDICINE CLINIC | Facility: MEDICAL CENTER | Age: 62
End: 2022-02-11
Payer: MEDICARE

## 2022-02-11 VITALS
BODY MASS INDEX: 43.95 KG/M2 | HEIGHT: 68 IN | WEIGHT: 290 LBS | TEMPERATURE: 99.4 F | HEART RATE: 80 BPM | DIASTOLIC BLOOD PRESSURE: 84 MMHG | SYSTOLIC BLOOD PRESSURE: 130 MMHG

## 2022-02-11 DIAGNOSIS — Z13.220 LIPID SCREENING: ICD-10-CM

## 2022-02-11 DIAGNOSIS — J30.89 ENVIRONMENTAL AND SEASONAL ALLERGIES: ICD-10-CM

## 2022-02-11 DIAGNOSIS — Z11.4 ENCOUNTER FOR SCREENING FOR HIV: ICD-10-CM

## 2022-02-11 DIAGNOSIS — I10 ESSENTIAL HYPERTENSION: ICD-10-CM

## 2022-02-11 DIAGNOSIS — Z13.29 THYROID DISORDER SCREEN: ICD-10-CM

## 2022-02-11 DIAGNOSIS — L65.9 HAIR LOSS: ICD-10-CM

## 2022-02-11 DIAGNOSIS — K21.9 GASTROESOPHAGEAL REFLUX DISEASE WITHOUT ESOPHAGITIS: ICD-10-CM

## 2022-02-11 DIAGNOSIS — E11.9 TYPE 2 DIABETES MELLITUS WITHOUT COMPLICATION, WITHOUT LONG-TERM CURRENT USE OF INSULIN (HCC): Primary | ICD-10-CM

## 2022-02-11 PROBLEM — R09.89 UPPER RESPIRATORY SYMPTOM: Status: ACTIVE | Noted: 2022-02-11

## 2022-02-11 LAB — SL AMB POCT HEMOGLOBIN AIC: 6.6 (ref ?–6.5)

## 2022-02-11 PROCEDURE — 83036 HEMOGLOBIN GLYCOSYLATED A1C: CPT

## 2022-02-11 PROCEDURE — 92250 FUNDUS PHOTOGRAPHY W/I&R: CPT

## 2022-02-11 PROCEDURE — 99214 OFFICE O/P EST MOD 30 MIN: CPT

## 2022-02-11 RX ORDER — OMEPRAZOLE 20 MG/1
CAPSULE, DELAYED RELEASE ORAL
Qty: 180 CAPSULE | Refills: 1 | Status: SHIPPED | OUTPATIENT
Start: 2022-02-11 | End: 2022-06-24

## 2022-02-11 RX ORDER — MONTELUKAST SODIUM 10 MG/1
10 TABLET ORAL
Qty: 90 TABLET | Refills: 3 | Status: SHIPPED | OUTPATIENT
Start: 2022-02-11 | End: 2022-05-12

## 2022-02-11 RX ORDER — FLUTICASONE PROPIONATE 50 MCG
1 SPRAY, SUSPENSION (ML) NASAL DAILY
Qty: 16 G | Refills: 3 | Status: SHIPPED | OUTPATIENT
Start: 2022-02-11

## 2022-02-11 RX ORDER — DESLORATADINE 5 MG/1
5 TABLET ORAL DAILY
Qty: 90 TABLET | Refills: 3 | Status: SHIPPED | OUTPATIENT
Start: 2022-02-11 | End: 2022-05-12

## 2022-02-11 NOTE — PROGRESS NOTES
Assessment/Plan:    No problem-specific Assessment & Plan notes found for this encounter  Colonoscopy up-to-date  Patient declined mammogram   Patient declined COVID vaccine  Patient does not wish to see OBGYN or have PAP performed  Patient follows with Podiatry regularly  Patient does not see optometrist however is open to having diabetic iris exam performed in office today  Advised patient to have Tdap done at local pharmacy  Advised patient to follow up with ENT regarding chronic congestion and ongoing smell issues that she has had since COVID  Patient has her own ENT and does not need referral        1  Type 2 diabetes mellitus without complication, without long-term current use of insulin ([facility])  Component      Latest Ref Rng & Units 2/11/2022   Hemoglobin A1C      6 5 6 6 (A)   Discussed following a healthy diet and exercise and decreasing intake of carbohydrates and sugary foods  Will recheck A1C at next office visit in 6 months   - POCT hemoglobin A1c  - Comprehensive metabolic panel; Future  - IRIS Diabetic eye exam    2  Essential hypertension  Current regimen includes amlodipine and benazepril  Patient tolerating well  - CBC and differential; Future  - Comprehensive metabolic panel; Future    3  Environmental and seasonal allergies  Will refill medications per patient request   Advised patient to restart these medications  - desloratadine (CLARINEX) 5 MG tablet; Take 1 tablet (5 mg total) by mouth daily  Dispense: 90 tablet; Refill: 3  - fluticasone (FLONASE) 50 mcg/act nasal spray; 1 spray into each nostril daily  Dispense: 16 g; Refill: 3  - montelukast (SINGULAIR) 10 mg tablet; Take 1 tablet (10 mg total) by mouth daily at bedtime  Dispense: 90 tablet; Refill: 3    4  Hair loss  Reports recent hair loss over several months  No other associated symptoms; intolerance to cold, fatigue   - TSH, 3rd generation with Free T4 reflex; Future    5   Thyroid disorder screen  - TSH, 3rd generation with Free T4 reflex; Future    6  Encounter for screening for HIV  - HIV 1/2 Antigen/Antibody (4th Generation) w Reflex [facility]; Future    7  Lipid screening  - Lipid panel; Future      Subjective:      Patient ID: Roxanna Sullivan is a 63 y o  female  61-year-old female presents for follow-up  She has type 2 diabetes and is controlled with diet, she is still taking Pravastatin for cardiovascular protection  Tolerating medication well  She has hypertension  Current treatment includes amlodipine and benazepril  Tolerating medication well  She has allergies and current regimen includes Clarinex, Flonase, Singulair  Well controlled with current regimen  Patient reports since having COVID in 11/21 she still has a weird smell and some congestion  She is not currently taking her allergy medication because she ran out and needs a refill  She has gout, takes allopurinol daily  Follows with Podiatry regularly  The following portions of the patient's history were reviewed and updated as appropriate: allergies, current medications, past medical history, past social history, past surgical history and problem list     Review of Systems   Constitutional: Negative for activity change, appetite change, chills, diaphoresis, fatigue, fever and unexpected weight change  HENT: Positive for congestion  Negative for dental problem, ear discharge, ear pain, nosebleeds, postnasal drip, rhinorrhea, sinus pressure, sinus pain, sore throat and trouble swallowing  Eyes: Negative for pain and visual disturbance  Respiratory: Negative for cough and shortness of breath  Cardiovascular: Negative for chest pain, palpitations and leg swelling  Gastrointestinal: Negative for abdominal distention, abdominal pain, constipation, nausea and vomiting  Endocrine:        Reports hair loss for several months   Genitourinary: Negative for difficulty urinating, dysuria, frequency, hematuria and urgency     Musculoskeletal: Negative for arthralgias, back pain, gait problem, myalgias and neck pain  Skin: Negative for color change and rash  Neurological: Negative for dizziness, seizures, syncope and headaches  Psychiatric/Behavioral: Negative for agitation, behavioral problems and confusion  All other systems reviewed and are negative  Objective:      /84 (BP Location: Left arm, Patient Position: Sitting, Cuff Size: Large)   Pulse 80   Temp 99 4 °F (37 4 °C)   Ht 5' 8" (1 727 m)   Wt 132 kg (290 lb)   BMI 44 09 kg/m²          Physical Exam  Constitutional:       General: She is not in acute distress  Appearance: Normal appearance  She is obese  She is not ill-appearing  HENT:      Head: Normocephalic and atraumatic  Eyes:      Pupils: Pupils are equal, round, and reactive to light  Cardiovascular:      Rate and Rhythm: Normal rate  Pulses: Normal pulses  Heart sounds: Normal heart sounds  Pulmonary:      Effort: Pulmonary effort is normal  No respiratory distress  Breath sounds: Normal breath sounds  No wheezing  Abdominal:      General: Abdomen is flat  Bowel sounds are normal       Palpations: Abdomen is soft  Tenderness: There is no abdominal tenderness  Musculoskeletal:         General: Normal range of motion  Cervical back: Normal range of motion and neck supple  Skin:     General: Skin is warm and dry  Neurological:      General: No focal deficit present  Mental Status: She is alert and oriented to person, place, and time  Mental status is at baseline  Psychiatric:         Mood and Affect: Mood normal          Behavior: Behavior normal            BMI Counseling: Body mass index is 44 09 kg/m²  The BMI is above normal  Nutrition recommendations include encouraging healthy choices of fruits and vegetables, decreasing fast food intake, consuming healthier snacks and moderation in carbohydrate intake  Exercise recommendations include exercising 3-5 times per week   No pharmacotherapy was ordered  Rationale for BMI follow-up plan is due to patient being overweight or obese               RAHEL White

## 2022-02-11 NOTE — PATIENT INSTRUCTIONS
-continue healthy diet and exercise     -decrease consumption of carbohydrates, sugary foods and increase exercise routine     -continue to follow-up with podiatry regularly     -call and schedule an appointment with your ENT doctor     -get blood work done at your earliest convenience, fasting     -Follow-up in 6 months

## 2022-02-22 LAB
LEFT EYE DIABETIC RETINOPATHY: NORMAL
LEFT EYE IMAGE QUALITY: NORMAL
LEFT EYE MACULAR EDEMA: NORMAL
LEFT EYE OTHER RETINOPATHY: NORMAL
RIGHT EYE DIABETIC RETINOPATHY: NORMAL
RIGHT EYE IMAGE QUALITY: NORMAL
RIGHT EYE MACULAR EDEMA: NORMAL
RIGHT EYE OTHER RETINOPATHY: NORMAL
SEVERITY (EYE EXAM): NORMAL

## 2022-02-23 ENCOUNTER — TELEPHONE (OUTPATIENT)
Dept: FAMILY MEDICINE CLINIC | Facility: MEDICAL CENTER | Age: 62
End: 2022-02-23

## 2022-02-24 ENCOUNTER — TELEPHONE (OUTPATIENT)
Dept: FAMILY MEDICINE CLINIC | Facility: MEDICAL CENTER | Age: 62
End: 2022-02-24

## 2022-02-24 NOTE — TELEPHONE ENCOUNTER
----- Message from [address] [place] [address] sent at 2/23/2022  9:01 PM EST -----  Please call patient and inform her the diabetic IRIS exam performed in office was negative for diabetic retinopathy and it is recommended she have another one in 1 year  I recommend she still see an ophthalmologist for more detailed exam and confirmation of IRIS exam findings

## 2022-03-15 ENCOUNTER — APPOINTMENT (OUTPATIENT)
Dept: LAB | Facility: MEDICAL CENTER | Age: 62
End: 2022-03-15
Payer: MEDICARE

## 2022-03-15 DIAGNOSIS — Z11.4 ENCOUNTER FOR SCREENING FOR HIV: ICD-10-CM

## 2022-03-15 DIAGNOSIS — Z13.220 LIPID SCREENING: ICD-10-CM

## 2022-03-15 DIAGNOSIS — Z13.29 THYROID DISORDER SCREEN: ICD-10-CM

## 2022-03-15 DIAGNOSIS — I10 ESSENTIAL HYPERTENSION: ICD-10-CM

## 2022-03-15 DIAGNOSIS — L65.9 HAIR LOSS: ICD-10-CM

## 2022-03-15 DIAGNOSIS — E11.9 TYPE 2 DIABETES MELLITUS WITHOUT COMPLICATION, WITHOUT LONG-TERM CURRENT USE OF INSULIN (HCC): ICD-10-CM

## 2022-03-15 LAB
ALBUMIN SERPL BCP-MCNC: 3.9 G/DL (ref 3.5–5)
ALP SERPL-CCNC: 122 U/L (ref 46–116)
ALT SERPL W P-5'-P-CCNC: 37 U/L (ref 12–78)
ANION GAP SERPL CALCULATED.3IONS-SCNC: 8 MMOL/L (ref 4–13)
AST SERPL W P-5'-P-CCNC: 21 U/L (ref 5–45)
BASOPHILS # BLD AUTO: 0.14 THOUSANDS/ΜL (ref 0–0.1)
BASOPHILS NFR BLD AUTO: 1 % (ref 0–1)
BILIRUB SERPL-MCNC: 0.5 MG/DL (ref 0.2–1)
BUN SERPL-MCNC: 25 MG/DL (ref 5–25)
CALCIUM SERPL-MCNC: 9.5 MG/DL (ref 8.3–10.1)
CHLORIDE SERPL-SCNC: 104 MMOL/L (ref 100–108)
CHOLEST SERPL-MCNC: 183 MG/DL
CO2 SERPL-SCNC: 25 MMOL/L (ref 21–32)
CREAT SERPL-MCNC: 0.91 MG/DL (ref 0.6–1.3)
EOSINOPHIL # BLD AUTO: 0.26 THOUSAND/ΜL (ref 0–0.61)
EOSINOPHIL NFR BLD AUTO: 2 % (ref 0–6)
ERYTHROCYTE [DISTWIDTH] IN BLOOD BY AUTOMATED COUNT: 14.2 % (ref 11.6–15.1)
GFR SERPL CREATININE-BSD FRML MDRD: 69 ML/MIN/1.73SQ M
GLUCOSE P FAST SERPL-MCNC: 117 MG/DL (ref 65–99)
HCT VFR BLD AUTO: 46.7 % (ref 34.8–46.1)
HDLC SERPL-MCNC: 78 MG/DL
HGB BLD-MCNC: 15.2 G/DL (ref 11.5–15.4)
IMM GRANULOCYTES # BLD AUTO: 0.14 THOUSAND/UL (ref 0–0.2)
IMM GRANULOCYTES NFR BLD AUTO: 1 % (ref 0–2)
LDLC SERPL CALC-MCNC: 81 MG/DL (ref 0–100)
LYMPHOCYTES # BLD AUTO: 4.36 THOUSANDS/ΜL (ref 0.6–4.47)
LYMPHOCYTES NFR BLD AUTO: 28 % (ref 14–44)
MCH RBC QN AUTO: 28.7 PG (ref 26.8–34.3)
MCHC RBC AUTO-ENTMCNC: 32.5 G/DL (ref 31.4–37.4)
MCV RBC AUTO: 88 FL (ref 82–98)
MONOCYTES # BLD AUTO: 0.97 THOUSAND/ΜL (ref 0.17–1.22)
MONOCYTES NFR BLD AUTO: 6 % (ref 4–12)
NEUTROPHILS # BLD AUTO: 9.46 THOUSANDS/ΜL (ref 1.85–7.62)
NEUTS SEG NFR BLD AUTO: 62 % (ref 43–75)
NONHDLC SERPL-MCNC: 105 MG/DL
NRBC BLD AUTO-RTO: 0 /100 WBCS
PLATELET # BLD AUTO: 454 THOUSANDS/UL (ref 149–390)
PMV BLD AUTO: 10.9 FL (ref 8.9–12.7)
POTASSIUM SERPL-SCNC: 4.2 MMOL/L (ref 3.5–5.3)
PROT SERPL-MCNC: 8.5 G/DL (ref 6.4–8.2)
RBC # BLD AUTO: 5.3 MILLION/UL (ref 3.81–5.12)
SODIUM SERPL-SCNC: 137 MMOL/L (ref 136–145)
TRIGL SERPL-MCNC: 120 MG/DL
TSH SERPL DL<=0.05 MIU/L-ACNC: 1.64 UIU/ML (ref 0.36–3.74)
WBC # BLD AUTO: 15.33 THOUSAND/UL (ref 4.31–10.16)

## 2022-03-15 PROCEDURE — 85025 COMPLETE CBC W/AUTO DIFF WBC: CPT

## 2022-03-15 PROCEDURE — 84443 ASSAY THYROID STIM HORMONE: CPT

## 2022-03-15 PROCEDURE — 36415 COLL VENOUS BLD VENIPUNCTURE: CPT

## 2022-03-15 PROCEDURE — 80061 LIPID PANEL: CPT

## 2022-03-15 PROCEDURE — 87389 HIV-1 AG W/HIV-1&-2 AB AG IA: CPT

## 2022-03-15 PROCEDURE — 80053 COMPREHEN METABOLIC PANEL: CPT

## 2022-03-16 ENCOUNTER — TELEPHONE (OUTPATIENT)
Dept: FAMILY MEDICINE CLINIC | Facility: MEDICAL CENTER | Age: 62
End: 2022-03-16

## 2022-03-16 ENCOUNTER — TELEPHONE (OUTPATIENT)
Dept: HEMATOLOGY ONCOLOGY | Facility: CLINIC | Age: 62
End: 2022-03-16

## 2022-03-16 LAB — HIV 1+2 AB+HIV1 P24 AG SERPL QL IA: NORMAL

## 2022-03-16 NOTE — TELEPHONE ENCOUNTER
New Patient Intake Form   Patient Details:    Joslyn Harvey  1960  [id number]    Appointment Information   Who is calling to schedule? Patient   If not self, what is the caller's name? Please put name of RBC nurse as well  n/a   Referring provider Priyanka Streeter   What is the diagnosis? Abnormal blood cell count     Is there a confirmed tissue diagnosis? No   Is there a biopsy ordered or pending? Please specify dates   n/a     Is patient aware of diagnosis? Yes   Have you had any imaging or labs done? If yes, where? (If imaging done outside of [facility], please remind patient to bring a disk ) Yes   [facility]     If imaging done at outside facility, did you instruct patient to obtain discs and bring to visit? n/a   Have you been seen by another Oncologist/Hematologist?  If so, who and where? no   Are the records in [facility] or Care Everywhere? yes   Does the patient have records at another facility/hospital? no   If yes, Name of facility, city and state where facility is located  Did you instruct patient to have records faxed to rightx and provide rightfax number? n/a   Preferred [place]   Is the patient willing to be seen by another provider?   (This is for breast patients only) n/a   Miscellaneous Information: Scheduled appointment 4/8/22 11:20 am  Patient request Fabby woodruff
Principal Discharge DX:	Encounter for medical screening examination

## 2022-03-16 NOTE — TELEPHONE ENCOUNTER
----- Message from [address] [place] [address] sent at 3/15/2022  4:57 PM EDT -----  Please call patient and inform her blood work shows some elevations in her blood counts  I would her to see Hematology regarding this  I placed a referral  If she does not hear from them she should let us know so we can provide her with the contact information  The rest of her labs are unremarkable  Thank you!

## 2022-03-18 ENCOUNTER — TELEPHONE (OUTPATIENT)
Dept: FAMILY MEDICINE CLINIC | Facility: MEDICAL CENTER | Age: 62
End: 2022-03-18

## 2022-03-18 NOTE — TELEPHONE ENCOUNTER
----- Message from [address] [place] [address] sent at 3/18/2022  8:49 AM EDT -----  Please call patient and inform her HIV testing was negative  Thank you

## 2022-04-08 ENCOUNTER — CONSULT (OUTPATIENT)
Dept: HEMATOLOGY ONCOLOGY | Facility: CLINIC | Age: 62
End: 2022-04-08
Payer: MEDICARE

## 2022-04-08 VITALS
OXYGEN SATURATION: 96 % | HEART RATE: 120 BPM | BODY MASS INDEX: 44.41 KG/M2 | SYSTOLIC BLOOD PRESSURE: 122 MMHG | DIASTOLIC BLOOD PRESSURE: 84 MMHG | TEMPERATURE: 98.6 F | HEIGHT: 68 IN | WEIGHT: 293 LBS | RESPIRATION RATE: 18 BRPM

## 2022-04-08 DIAGNOSIS — Z12.31 ENCOUNTER FOR SCREENING MAMMOGRAM FOR MALIGNANT NEOPLASM OF BREAST: ICD-10-CM

## 2022-04-08 DIAGNOSIS — D75.839 THROMBOCYTOSIS: Primary | ICD-10-CM

## 2022-04-08 DIAGNOSIS — R79.9 ABNORMAL BLOOD CELL COUNT: ICD-10-CM

## 2022-04-08 PROBLEM — D72.829 LEUKOCYTOSIS: Status: ACTIVE | Noted: 2022-04-08

## 2022-04-08 PROCEDURE — 99204 OFFICE O/P NEW MOD 45 MIN: CPT | Performed by: INTERNAL MEDICINE

## 2022-04-08 NOTE — PROGRESS NOTES
Hematology/Oncology Consult Note    Date of Service: 4/8/2022    [facility]ATOLOG[facility]   [address] [facility] [address]    Reason for Consultation:  Leukocytosis and thrombocytosis  AJCC 8th Edition Cancer Stage:   Cancer Staging  No matching staging information was found for the patient  Referral Physician: RAHEL Lopez    Oncology/Hematology History:  · Chronic leukocytosis with WBC between 10-16  since Nellie 10, 2015  · March 15, 2022, WBC 15 33, hemoglobin 15 2, platelet count 070  Absolute basal cell 0 14, absolute neutrophil 9 46  Assessment and Recommendations:     I personally reviewed the lab results,  the image studies,  pathology, other specialty/physicians consult notes/recommendations, and outside medical records  I had a lengthy discussion with the patient and shared the work-up findings  We discussed the diagnosis and management plan as below  1  Chronic Leukocytosis, likely reactive to steroids, myeloproliferative disorder cannot be ruled out  Patient took steroids for gout flare  Last gout flare was in March 2022  CBC in March 15, 2022 showed WBC 15 33  Platelet count 474  Patient has no B symptoms  I will check CBC, CMP, ESR, C-reactive protein, lymphoma leukemia panel from peripheral blood, bcr/ABL by fish to rule out CML  2  Thrombocytosis with normal hemoglobin  I will repeat CBC  Check JAK2 mutation and MPL mutation  3  Health maintenance  Patient never had mammogram   Patient is asymptomatic  I will order screening mammogram at baseline  4  Follow-up:  Return to clinic in 4-5 weeks  Thank you very much for your consultation and making us part of this nice patient's care  I will continue to follow closely with you  Please contact me with any additional questions  Disclaimer: This document was prepared using [employer] Direct technology   If a word or phrase is confusing, or does not make sense, this is likely due to recognition error which was not discovered during the providers review  If you believe an error has occurred, please contact me through [employer] service for marisol? cation  HPI:   Carli Valdez is a 63 y o  female with a past medical history of gout, diabetes mellitus, GERD, and hypertension who had lab which showed chronic leukocytosis and thrombocytosis  WBC between 10-16 since 2015  Patient took steroids on and off for gout flare  Last gout flare was in March 2022  Lab on March 15, 2022 showed WBC 15 33, hemoglobin 15 2, platelet count 848  Normal kidney function and liver function  The patient is referred to Medical Oncology Hematology for management of says thrombocytosis and the leukocytosis  Patient feels fine  No fever or chills  No night sweats  No exertional chest pain, diaphoresis or shortness  of breath  No cough and phlegm, no hemoptysis  Patient denied nausea  and vomiting  No abdominal pain  No diarrhea or constipation  No  symptoms  No headache or blurred vision  No seizure activity  Appetite good  No significant weight loss or weight gain  The patient denies bleeding anywhere      Review of system:  12-point review of system was performed, pertinent positive and negative were detailed as above    Past Medical History:   Diagnosis Date    Diabetes mellitus ([address] )     Fatty liver     GERD (gastroesophageal reflux disease)     Hypertension        Past Surgical History:   Procedure Laterality Date    BACK SURGERY      FINGER SURGERY      SINUS SURGERY      TUBAL LIGATION         Family History   Problem Relation Age of Onset    Heart disease Mother     Atrial fibrillation Mother     Thyroid disease Mother     Coronary artery disease Father     Diabetes Father        Social History     Socioeconomic History    Marital status: Single     Spouse name: Not on file    Number of children: Not on file  Years of education: Not on file    Highest education level: Not on file   Occupational History    Not on file   Tobacco Use    Smoking status: Never Smoker    Smokeless tobacco: Never Used   Vaping Use    Vaping Use: Never used   Substance and Sexual Activity    Alcohol use: Never    Drug use: Never    Sexual activity: Not on file   Other Topics Concern    Not on file   Social History Narrative    Not on file     Social Determinants of Health     Financial Resource Strain: Not on file   Food Insecurity: Not on file   Transportation Needs: Not on file   Physical Activity: Not on file   Stress: Not on file   Social Connections: Not on file   Intimate Partner Violence: Not on file   Housing Stability: Not on file       Allergies   Allergen Reactions    Penicillins        Current Outpatient Medications   Medication Sig Dispense Refill    allopurinol (ZYLOPRIM) 100 mg tablet TAKE 1 TABLET BY MOUTH EVERY DAY (Patient taking differently: Take 300 mg by mouth daily ) 30 tablet 0    amLODIPine (NORVASC) 10 mg tablet TAKE 1 TABLET BY MOUTH EVERY DAY 90 tablet 1    benazepril (LOTENSIN) 40 MG tablet TAKE 1 TABLET BY MOUTH EVERY DAY 90 tablet 1    desloratadine (CLARINEX) 5 MG tablet Take 1 tablet (5 mg total) by mouth daily 90 tablet 3    fluticasone (FLONASE) 50 mcg/act nasal spray 1 spray into each nostril daily 16 g 3    ibuprofen (MOTRIN) 800 mg tablet Take 1 tablet (800 mg total) by mouth 3 (three) times a day Take with meals 21 tablet 0    montelukast (SINGULAIR) 10 mg tablet Take 1 tablet (10 mg total) by mouth daily at bedtime 90 tablet 3    omeprazole (PriLOSEC) 20 mg delayed release capsule TAKE ONE CAPSULE 1/2 HOUR BEFORE BREAKFAST AND ONE CAPSULE 1/2 HOUR BEFORE DINNER 180 capsule 1    pravastatin (PRAVACHOL) 10 mg tablet TAKE 1 TABLET BY MOUTH EVERYDAY AT BEDTIME 90 tablet 1     No current facility-administered medications for this visit         (Not in a hospital admission)      Objective: 24 Hour Vitals Assessment:  Vitals:    04/08/22 1100   BP: 122/84   Pulse: (!) 120   Resp: 18   Temp: 98 6 °F (37 °C)   SpO2: 96%       PHYSICIAN EXAM:    General: Appearance: alert, cooperative, no distress  Morbid obese lady with BMI 45 31  HEENT: Normocephalic, atraumatic  No scleral icterus  conjunctivae clear  PERRL, EOM's intact  No sinus drainage or tenderness  Chest: No tenderness  Lungs: Clear to auscultation bilaterally, Respirations unlabored  Cardiac: Regular rate and rhythm, S1and S2 are normal, no murmur  Abdomen: Soft, non-tender, non-distended  Bowel sounds are normal  No masses, no organomegaly  Pelvic: deferred  Extremities:  No edema, cyanosis, clubbing  Skin: Skin color, turgor are normal  No rashes  Lymphatics: no palpable adenopathy  Neurologic: Alert and oriented X 3,  no focal neuro deficits  Normal strength and sensation  DATA REVIEW:    Pathology Result:    No results found for: [facility]     Image Results: They are reviewed and documented in Hematology/Oncology history    US abdomen complete  1 2 [id number] [id number] 1 [id number]  3 [id number]      LABS:  Lab data are reviewed and documented in HemOnc history  No results found for this or any previous visit (from the past 48 hour(s))      By:  Yu Narvaez MD, 4/8/2022, 11:54 AM                                  Primary Care Physician:  RAHEL Key

## 2022-04-25 ENCOUNTER — APPOINTMENT (OUTPATIENT)
Dept: LAB | Facility: HOSPITAL | Age: 62
End: 2022-04-25
Payer: MEDICARE

## 2022-04-25 DIAGNOSIS — R79.9 ABNORMAL BLOOD CELL COUNT: ICD-10-CM

## 2022-04-25 DIAGNOSIS — D75.839 THROMBOCYTOSIS: ICD-10-CM

## 2022-04-25 PROCEDURE — 88185 FLOWCYTOMETRY/TC ADD-ON: CPT

## 2022-04-28 ENCOUNTER — TELEPHONE (OUTPATIENT)
Dept: HEMATOLOGY ONCOLOGY | Facility: CLINIC | Age: 62
End: 2022-04-28

## 2022-04-28 NOTE — TELEPHONE ENCOUNTER
CALL RETURN FORM   Reason for patient call? Patient called in for to discuss results for recent lab work completed by Dr Hilario Gupta    Informed patient not all of the results are in the system yet    Patient request to be called to go over results when all of the results are in the chart   Patient's primary oncologist? Dr Hilario Gupta   Name of person the patient was calling for? Dr Hilario Gupta   Any additional information to add, if applicable? Informed patient that the message will be forwarded to the team and someone will get back to them as soon as possible    Did you relay this information to the patient?  Yes

## 2022-05-09 ENCOUNTER — TELEPHONE (OUTPATIENT)
Dept: HEMATOLOGY ONCOLOGY | Facility: CLINIC | Age: 62
End: 2022-05-09

## 2022-05-09 NOTE — TELEPHONE ENCOUNTER
Reviewed with Jerad Torres PA-C that patient labs are not all back to review and to go over  Reviewed that some of the testing will not be back yet and that our office will reach out when results are back   Patient does have an appointment on 5/20

## 2022-05-09 NOTE — TELEPHONE ENCOUNTER
CALL RETURN FORM   Reason for patient call? Patient requesting Dr Luz Maria Myles call her to go over lab results       Patient's primary oncologist? Dr Luz Maria Myles   Name of person the patient was calling for? Dr Luz Maria Myles   Any additional information to add, if applicable? [phone]   Informed patient that the message will be forwarded to the team and someone will get back to them as soon as possible    Did you relay this information to the patient?  yes

## 2022-05-16 ENCOUNTER — TELEPHONE (OUTPATIENT)
Dept: LAB | Facility: HOSPITAL | Age: 62
End: 2022-05-16

## 2022-05-16 NOTE — TELEPHONE ENCOUNTER
Hello! We have been waiting for this JAK2 test for several weeks now  Are you able to call to see if we can have results? Should be back by now  Thank you!     [employer]

## 2022-05-16 NOTE — TELEPHONE ENCOUNTER
JAK2 with reflex to CALR, MPL obtained  These results were negative  Rest of patients results were unrevealing  Soledad or Lavada Every, can you call patient and let her know all her results were unrevealing and we will discuss them further at appt 5/20? Thank you!

## 2022-05-18 NOTE — PROGRESS NOTES
Hematology/Oncology Progress Note    Date of Service: 5/20/2022    [facility] HEMATOLOGY [facility]   [address]   Daya Vancourt  [address] [place] [id number]    Hem/Onc Problem List:   1  Leukocytosis  2  Thrombocytosis  3  History of Rheumatoid Arthritis      Chief Complaint:    Follow up after workup of above    Assessment/Plan:     I personally reviewed the lab results, image studies results and other specialties/physicians consult notes and recommendations  I shared the findings with patient and family, discussed the diagnosis and management plan as below  1  Chronic leukocytosis, patient has leukocytosis since at least 2015  She did have elevation recently after taking steroids for gout flare up  However, repeat lab work as follows --> April 25, 2022, WBC 8 55, Hgb 14, MCV 92, PLT 350K, abs eosinophil 0 62, other diff normal   Further workup to rule out MPN, etc  --> BCR-ABL negative  Leukemia/lymphoma flow negative  Sed rate 81  CRP 32 1  JAK2 with reflex to CALR, MPL negative  Likely etiology of patient's leukocytosis is her history of rheumatoid arthritis, chronic osteoarthritis of her spine  She has no constitutional symptoms or frequent infections  Patient will have repeat lab work in 3 months  Does have elevated CRP, sed rate  She will continue to follow with her rheumatologist     2  Thrombocytosis, patient thrombocytosis resolved  ET ruled out with JAK2 with reflex testing  We will monitor this with next lab work  3  Not up-to-date on mammogram, patient has never had a mammogram   Defers for now  We will discuss this again at next visit  Follow up in 3 months with repeat CBC-D and clinical examination    Disclaimer: This document was prepared using [employer] technology  If a word or phrase is confusing, or does not make sense, this is likely due to recognition error which was not discovered during the providers review   If you believe an error has occurred, please Contact me through [employer] service for marisol? cation  AJCC 8th Edition Cancer Stage :      Cancer Staging  No matching staging information was found for the patient  Hematology/Oncology History:   · Chronic leukocytosis with WBC between 10-16  since Nellie 10, 2015  · March 15, 2022, WBC 15 33, hemoglobin 15 2, platelet count 300  Absolute basal cell 0 14, absolute neutrophil 9 46  · April 25, 2022, WBC 8 55, Hgb 14, MCV 92, PLT 350K, abs eosinophil 0 62, other diff normal  BCR-ABL negative  Leukemia/lymphoma flow negative  Sed rate 81  CRP 32 1  JAK2 with reflex to CALR, MPL negative  History of Present Illiness:   Ellen Molina is a 63 y o  female with the above-noted HemOnc history who is here  to follow-up regarding leukocytosis, thrombocytosis  She has had both showing mild elevation since at least 2019  WBC between 10-16 since 2015  Patient had a gout flare up in March and took steroids  Her WBC increased to 15 33, hemoglobin was 15 2, platelet was 652 K  she was referred to us for this  Patient's recent lab work as above from April 25, 2022 showed normalized WBC as well as platelet count  She did have mildly elevated absolute eosinophils  However, BCR-ABL negative, flow cytometry leukemia lymphoma negative, JAK2 with reflex to CALR, MPL negative  She does have an elevated sed rate, CRP  She does have a history of chronic arthritis in the back as well as rheumatoid arthritis  Patient has no complaints today  No constitutional symptoms  Patient feels fine  No fever or chills  No exertional chest pain, diaphoresis or shortness  of breath  No cough and phlegm, no hemoptysis  Patient denied nausea  and vomiting  No abdominal pain  No diarrhea or constipation  No  symptoms  No headache or blurred vision  No seizure activity  Appetite good  No significant weight loss or weight gain   The patient denies bleeding anywhere  ROS: A 12-point of review of systems is obtained and other than the above is noncontributory  Objective:   VITALS:   /88 (BP Location: Left arm, Patient Position: Sitting, Cuff Size: Adult)   Pulse 96   Temp 97 8 °F (36 6 °C)   Resp 18   Ht 5' 8" (1 727 m)   SpO2 100%   BMI 45 31 kg/m²     Physical EXAM  General:  Alert, cooperative, no distress, appears stated age  Head:  Normocephalic, without obvious abnormality, atraumatic  Eyes:  Conjunctivae/corneas clear  PERRL, EOMs intact  No evidence of conjunctivitis     Throat: Lips, mucosa, and tongue normal   No bleeding from mouth  Neck: Supple, symmetrical, trachea midline    Lungs:   Respiratory effort easy, nonlabored    Heart:  Regular rate and rhythm  Abdomen:   Soft, non-tender,nondistended  Bowel sounds normal  No masses,  No organomegaly  Extremities:  Lymphatics: Extremities normal, atraumatic, no cyanosis or edema  No cervical, axillary or inguinal adenopathy   Skin: Skin color, texture, turgor normal  No rashes  Neurologic: A&Ox4   No focal neuro deficits       Allergies   Allergen Reactions    Penicillins        Past Medical History:   Diagnosis Date    Diabetes mellitus [address] [address] )     Fatty liver     GERD (gastroesophageal reflux disease)     Hypertension        Past Surgical History:   Procedure Laterality Date    BACK SURGERY      FINGER SURGERY      SINUS SURGERY      TUBAL LIGATION         Family History   Problem Relation Age of Onset    Heart disease Mother     Atrial fibrillation Mother     Thyroid disease Mother     Coronary artery disease Father     Diabetes Father        Social History     Socioeconomic History    Marital status: Single     Spouse name: Not on file    Number of children: Not on file    Years of education: Not on file    Highest education level: Not on file   Occupational History    Not on file   Tobacco Use    Smoking status: Never Smoker    Smokeless tobacco: Never Used   Vaping Use    Vaping Use: Never used   Substance and Sexual Activity    Alcohol use: Never    Drug use: Never    Sexual activity: Not on file   Other Topics Concern    Not on file   Social History Narrative    Not on file     Social Determinants of Health     Financial Resource Strain: Not on file   Food Insecurity: Not on file   Transportation Needs: Not on file   Physical Activity: Not on file   Stress: Not on file   Social Connections: Not on file   Intimate Partner Violence: Not on file   Housing Stability: Not on file       Current Outpatient Medications   Medication Sig Dispense Refill    allopurinol (ZYLOPRIM) 100 mg tablet TAKE 1 TABLET BY MOUTH EVERY DAY (Patient taking differently: Take 300 mg by mouth daily ) 30 tablet 0    amLODIPine (NORVASC) 10 mg tablet TAKE 1 TABLET BY MOUTH EVERY DAY 90 tablet 1    benazepril (LOTENSIN) 40 MG tablet TAKE 1 TABLET BY MOUTH EVERY DAY 90 tablet 1    desloratadine (CLARINEX) 5 MG tablet Take 1 tablet (5 mg total) by mouth daily 90 tablet 3    fluticasone (FLONASE) 50 mcg/act nasal spray 1 spray into each nostril daily 16 g 3    ibuprofen (MOTRIN) 800 mg tablet Take 1 tablet (800 mg total) by mouth 3 (three) times a day Take with meals 21 tablet 0    montelukast (SINGULAIR) 10 mg tablet Take 1 tablet (10 mg total) by mouth daily at bedtime 90 tablet 3    omeprazole (PriLOSEC) 20 mg delayed release capsule TAKE ONE CAPSULE 1/2 HOUR BEFORE BREAKFAST AND ONE CAPSULE 1/2 HOUR BEFORE DINNER 180 capsule 1    pravastatin (PRAVACHOL) 10 mg tablet TAKE 1 TABLET BY MOUTH EVERYDAY AT BEDTIME 90 tablet 1     No current facility-administered medications for this visit  (Not in a hospital admission)      DATA REVIEW:    Pathology Result:    No results found for: [facility]     Image Results: They are reviewed and documented in Hematology/Oncology history    US abdomen complete  1 2 840 [id number] 99 1 [id number]  3 [id number]        LABS:  Lab data are reviewed and documented in HemOnc history  No results found for this or any previous visit (from the past 48 hour(s))            Graeme Elkins PA-C  5/20/2022, 9:31 AM

## 2022-05-20 ENCOUNTER — OFFICE VISIT (OUTPATIENT)
Dept: HEMATOLOGY ONCOLOGY | Facility: CLINIC | Age: 62
End: 2022-05-20
Payer: MEDICARE

## 2022-05-20 VITALS
DIASTOLIC BLOOD PRESSURE: 88 MMHG | TEMPERATURE: 97.8 F | RESPIRATION RATE: 18 BRPM | OXYGEN SATURATION: 100 % | SYSTOLIC BLOOD PRESSURE: 142 MMHG | HEIGHT: 68 IN | HEART RATE: 96 BPM | BODY MASS INDEX: 45.31 KG/M2

## 2022-05-20 DIAGNOSIS — R79.9 ABNORMAL BLOOD CELL COUNT: ICD-10-CM

## 2022-05-20 DIAGNOSIS — D75.839 THROMBOCYTOSIS: Primary | ICD-10-CM

## 2022-05-20 PROCEDURE — 99213 OFFICE O/P EST LOW 20 MIN: CPT | Performed by: INTERNAL MEDICINE

## 2022-05-28 DIAGNOSIS — E11.9 TYPE 2 DIABETES MELLITUS WITHOUT COMPLICATION, WITHOUT LONG-TERM CURRENT USE OF INSULIN (HCC): ICD-10-CM

## 2022-05-31 RX ORDER — PRAVASTATIN SODIUM 10 MG
TABLET ORAL
Qty: 90 TABLET | Refills: 1 | Status: SHIPPED | OUTPATIENT
Start: 2022-05-31

## 2022-06-01 DIAGNOSIS — I10 ESSENTIAL HYPERTENSION: ICD-10-CM

## 2022-06-01 RX ORDER — AMLODIPINE BESYLATE 10 MG/1
TABLET ORAL
Qty: 90 TABLET | Refills: 1 | Status: SHIPPED | OUTPATIENT
Start: 2022-06-01

## 2022-06-24 DIAGNOSIS — K21.9 GASTROESOPHAGEAL REFLUX DISEASE WITHOUT ESOPHAGITIS: ICD-10-CM

## 2022-06-24 DIAGNOSIS — I10 ESSENTIAL HYPERTENSION: ICD-10-CM

## 2022-06-24 RX ORDER — OMEPRAZOLE 20 MG/1
CAPSULE, DELAYED RELEASE ORAL
Qty: 180 CAPSULE | Refills: 1 | Status: SHIPPED | OUTPATIENT
Start: 2022-06-24

## 2022-06-24 RX ORDER — BENAZEPRIL HYDROCHLORIDE 40 MG/1
TABLET, FILM COATED ORAL
Qty: 90 TABLET | Refills: 1 | Status: SHIPPED | OUTPATIENT
Start: 2022-06-24

## 2022-06-26 ENCOUNTER — OFFICE VISIT (OUTPATIENT)
Dept: URGENT CARE | Facility: MEDICAL CENTER | Age: 62
End: 2022-06-26
Payer: MEDICARE

## 2022-06-26 VITALS
OXYGEN SATURATION: 96 % | RESPIRATION RATE: 20 BRPM | SYSTOLIC BLOOD PRESSURE: 128 MMHG | DIASTOLIC BLOOD PRESSURE: 83 MMHG | HEART RATE: 90 BPM | TEMPERATURE: 98 F

## 2022-06-26 DIAGNOSIS — J02.9 SORE THROAT: ICD-10-CM

## 2022-06-26 DIAGNOSIS — J06.0 ACUTE LARYNGOPHARYNGITIS: Primary | ICD-10-CM

## 2022-06-26 DIAGNOSIS — R50.9 FEVER, UNSPECIFIED FEVER CAUSE: ICD-10-CM

## 2022-06-26 LAB — S PYO AG THROAT QL: NEGATIVE

## 2022-06-26 PROCEDURE — 87880 STREP A ASSAY W/OPTIC: CPT | Performed by: PHYSICIAN ASSISTANT

## 2022-06-26 PROCEDURE — 87636 SARSCOV2 & INF A&B AMP PRB: CPT | Performed by: PHYSICIAN ASSISTANT

## 2022-06-26 PROCEDURE — 99213 OFFICE O/P EST LOW 20 MIN: CPT | Performed by: PHYSICIAN ASSISTANT

## 2022-06-26 PROCEDURE — G0463 HOSPITAL OUTPT CLINIC VISIT: HCPCS | Performed by: PHYSICIAN ASSISTANT

## 2022-06-26 RX ORDER — BENZONATATE 200 MG/1
200 CAPSULE ORAL 3 TIMES DAILY PRN
Qty: 20 CAPSULE | Refills: 0 | Status: SHIPPED | OUTPATIENT
Start: 2022-06-26

## 2022-06-26 NOTE — PROGRESS NOTES
[facility]        NAME: Edwin Chong is a 63 y o  female  : 1960    MRN: [id number]  DATE: 2022  TIME: 10:24 AM    Assessment and Plan   Acute laryngopharyngitis [J06 0]  1  Acute laryngopharyngitis  benzonatate (TESSALON) 200 MG capsule    Covid19 and INFLUENZA A/B PCR   2  Fever, unspecified fever cause  Covid19 and INFLUENZA A/B PCR   3  Sore throat  POCT rapid strepA    Covid19 and INFLUENZA A/B PCR         Patient Instructions     1  Over-the-counter ibuprofen and/or acetaminophen as needed for pain or fever  2  Oxymetazoline nasal spray 2 sprays in each nostril every 12 hours for no more than the next 5 days as needed for nasal congestion  3  Over-the-counter guaifenesin as needed for mucus relief  4  Gargle salt water as needed for sore throat relief  5  Increase oral fluid consumption  6  Follow-up with primary care provider in 7 days for any persistent symptoms  7  Quarantine pending the results of your COVID/flu test         Chief Complaint     Chief Complaint   Patient presents with    Cold Like Symptoms     Fever at home- Tmax 102 this morning, pt took tylenol at 630am  All week c/o sore throat, earache and HA  History of Present Illness       61-year-old female patient with a 1 week history of sore throat, mild nasal congestion, mild dry cough  More recently she has developed fever with a T-max of a 102°  She has also developed hoarseness  She denies any chest pain, shortness of breath, body aches  She does complain of fatigue  She is not COVID vaccinated but had a negative home COVID test       Review of Systems   Review of Systems   Constitutional: Positive for fever  HENT: Positive for congestion, sore throat and voice change  Negative for facial swelling, rhinorrhea and sinus pain  Respiratory: Positive for cough  Cardiovascular: Negative for chest pain  Gastrointestinal: Negative for abdominal pain     Musculoskeletal: Negative for myalgias  Skin: Negative for rash           Current Medications       Current Outpatient Medications:     allopurinol (ZYLOPRIM) 100 mg tablet, TAKE 1 TABLET BY MOUTH EVERY DAY (Patient taking differently: Take 300 mg by mouth daily), Disp: 30 tablet, Rfl: 0    amLODIPine (NORVASC) 10 mg tablet, TAKE 1 TABLET BY MOUTH EVERY DAY, Disp: 90 tablet, Rfl: 1    benazepril (LOTENSIN) 40 MG tablet, TAKE 1 TABLET BY MOUTH EVERY DAY, Disp: 90 tablet, Rfl: 1    benzonatate (TESSALON) 200 MG capsule, Take 1 capsule (200 mg total) by mouth 3 (three) times a day as needed for cough, Disp: 20 capsule, Rfl: 0    fluticasone (FLONASE) 50 mcg/act nasal spray, 1 spray into each nostril daily, Disp: 16 g, Rfl: 3    ibuprofen (MOTRIN) 800 mg tablet, Take 1 tablet (800 mg total) by mouth 3 (three) times a day Take with meals, Disp: 21 tablet, Rfl: 0    omeprazole (PriLOSEC) 20 mg delayed release capsule, TAKE ONE CAPSULE 1/2 HOUR BEFORE BREAKFAST AND ONE CAPSULE 1/2 HOUR BEFORE DINNER, Disp: 180 capsule, Rfl: 1    pravastatin (PRAVACHOL) 10 mg tablet, TAKE 1 TABLET BY MOUTH EVERYDAY AT BEDTIME, Disp: 90 tablet, Rfl: 1    desloratadine (CLARINEX) 5 MG tablet, Take 1 tablet (5 mg total) by mouth daily, Disp: 90 tablet, Rfl: 3    montelukast (SINGULAIR) 10 mg tablet, Take 1 tablet (10 mg total) by mouth daily at bedtime, Disp: 90 tablet, Rfl: 3    Current Allergies     Allergies as of 06/26/2022 - Reviewed 06/26/2022   Allergen Reaction Noted    Penicillins  06/19/2014            The following portions of the patient's history were reviewed and updated as appropriate: allergies, current medications, past family history, past medical history, past social history, past surgical history and problem list      Past Medical History:   Diagnosis Date    Diabetes mellitus ([address] )     Fatty liver     GERD (gastroesophageal reflux disease)     Hypertension        Past Surgical History:   Procedure Laterality Date    BACK SURGERY      FINGER SURGERY      SINUS SURGERY      TUBAL LIGATION         Family History   Problem Relation Age of Onset    Heart disease Mother     Atrial fibrillation Mother     Thyroid disease Mother     Coronary artery disease Father     Diabetes Father          Medications have been verified  Objective   /83   Pulse 90   Temp 98 °F (36 7 °C)   Resp 20   SpO2 96%        Physical Exam     Physical Exam  Vitals and nursing note reviewed  Constitutional:       General: She is not in acute distress  Appearance: Normal appearance  She is not ill-appearing or toxic-appearing  HENT:      Head: Normocephalic  Right Ear: Tympanic membrane normal       Left Ear: Tympanic membrane normal       Nose: Congestion present  Mouth/Throat:      Mouth: Mucous membranes are moist       Pharynx: Oropharynx is clear  Posterior oropharyngeal erythema present  Eyes:      Conjunctiva/sclera: Conjunctivae normal       Pupils: Pupils are equal, round, and reactive to light  Cardiovascular:      Rate and Rhythm: Normal rate and regular rhythm  Pulses: Normal pulses  Pulmonary:      Effort: Pulmonary effort is normal       Breath sounds: Normal breath sounds  Musculoskeletal:         General: Normal range of motion  Cervical back: Normal range of motion  Skin:     General: Skin is warm and dry  Capillary Refill: Capillary refill takes less than 2 seconds  Neurological:      General: No focal deficit present  Mental Status: She is alert and oriented to person, place, and time     Psychiatric:         Mood and Affect: Mood normal          Behavior: Behavior normal

## 2022-06-27 LAB
FLUAV RNA RESP QL NAA+PROBE: NEGATIVE
FLUBV RNA RESP QL NAA+PROBE: NEGATIVE
SARS-COV-2 RNA RESP QL NAA+PROBE: NEGATIVE

## 2022-07-11 ENCOUNTER — TELEPHONE (OUTPATIENT)
Dept: FAMILY MEDICINE CLINIC | Facility: MEDICAL CENTER | Age: 62
End: 2022-07-11

## 2022-07-11 NOTE — TELEPHONE ENCOUNTER
Pt said she needs an excuse for jury duty for her chronic back pain and gout in her feet  Pt would like it faxed to [facility] at [phone]  Please, advise if letter can be sent  Include her juror ID number [id number]

## 2022-07-12 NOTE — TELEPHONE ENCOUNTER
Letter composed  Attempted to fax to original number but it would not go through  Called patient and got the correct number of [phone]  Letter then faxed successfully

## 2022-08-05 ENCOUNTER — RA CDI HCC (OUTPATIENT)
Dept: OTHER | Facility: HOSPITAL | Age: 62
End: 2022-08-05

## 2022-08-05 NOTE — PROGRESS NOTES
[address]  coding opportunities       Chart reviewed, no opportunity found: CHART REVIEWED, NO OPPORTUNITY FOUND        Patients Insurance     Medicare Insurance: Medicare

## 2022-08-10 NOTE — PROGRESS NOTES
Assessment and Plan:   Fasting blood work ordered, to be done earliest convenience  Follow-up in 6 months or sooner if needed  Patient advised if upper respiratory symptoms worsen or persist, call office  No treatment at this time as PE unremarkable  Continue allergy medication and Ibuprofen as needed  Patient overdue for cervical cancer screening, declined gyn visit/referral   Patient overdue for mammogram, declined  Discussed COVID vaccination with patient, declined  Colonoscopy up-to-date  Problem List Items Addressed This Visit        Endocrine    Diabetes ([facility] [address] )    Relevant Orders    Lipid panel    Hemoglobin A1C       Other    Upper respiratory symptom      Other Visit Diagnoses     Medicare annual wellness visit, subsequent    -  Primary    Essential hypertension        Relevant Orders    Comprehensive metabolic panel    Lipid screening        Encounter for vitamin deficiency screening              Depression Screening and Follow-up Plan: Patient was screened for depression during today's encounter  They screened negative with a PHQ-2 score of 0  Preventive health issues were discussed with patient, and age appropriate screening tests were ordered as noted in patient's After Visit Summary  Personalized health advice and appropriate referrals for health education or preventive services given if needed, as noted in patient's After Visit Summary  History of Present Illness:     Patient presents for a Medicare Wellness Visit    63year old female presents for AWV  She is doing well overall  No changes since last AWV  She is c/o mid back pain with movement, certain position changes, and palpation x 3 weeks  Denies fall/injury  Also c/o exertional sob with wheezing x 1 week  She is also having associated congestion, runny nose, on and off loss of voice  She has seasonal allergies  Denies sore throat, cough, fever, chills, cp     She reports being seen at urgent care end of June and has not gotten fully better since then  COVID and Strep test then both negative  Patient Care Team:  Sukh Langley as PCP - General (Nurse Practitioner)  Mynor Carter MD (Gastroenterology)     Review of Systems:     Review of Systems   Constitutional: Negative  HENT: Positive for congestion, rhinorrhea and voice change  Eyes: Positive for discharge (clear)  Respiratory: Positive for shortness of breath and wheezing  Cardiovascular: Negative  Gastrointestinal: Negative  Endocrine: Negative  Genitourinary: Negative  Musculoskeletal: Positive for back pain  Skin: Negative  Allergic/Immunologic: Positive for environmental allergies  Neurological: Negative  Hematological: Negative  Psychiatric/Behavioral: Negative           Problem List:     Patient Active Problem List   Diagnosis    Cervical spondylosis    HTN (hypertension)    GERD (gastroesophageal reflux disease)    Morbid obesity (HCC)    Environmental and seasonal allergies    Diabetes ([address] )    Gout    Upper respiratory symptom    Leukocytosis    Thrombocytosis      Past Medical and Surgical History:     Past Medical History:   Diagnosis Date    Diabetes mellitus ([address] )     Fatty liver     GERD (gastroesophageal reflux disease)     Hypertension      Past Surgical History:   Procedure Laterality Date    BACK SURGERY      FINGER SURGERY      SINUS SURGERY      TUBAL LIGATION        Family History:     Family History   Problem Relation Age of Onset    Heart disease Mother     Atrial fibrillation Mother     Thyroid disease Mother     Coronary artery disease Father     Diabetes Father       Social History:     Social History     Socioeconomic History    Marital status: Single     Spouse name: None    Number of children: None    Years of education: None    Highest education level: None   Occupational History    None   Tobacco Use    Smoking status: Never Smoker    Smokeless tobacco: Never Used   Vaping Use  Vaping Use: Never used   Substance and Sexual Activity    Alcohol use: Never    Drug use: Never    Sexual activity: None   Other Topics Concern    None   Social History Narrative    None     Social Determinants of Health     Financial Resource Strain: Low Risk     Difficulty of Paying Living Expenses: Not hard at all   Food Insecurity: Not on file   Transportation Needs: No Transportation Needs    Lack of Transportation (Medical): No    Lack of Transportation (Non-Medical): No   Physical Activity: Not on file   Stress: Not on file   Social Connections: Not on file   Intimate Partner Violence: Not on file   Housing Stability: Not on file      Medications and Allergies:     Current Outpatient Medications   Medication Sig Dispense Refill    allopurinol (ZYLOPRIM) 100 mg tablet TAKE 1 TABLET BY MOUTH EVERY DAY (Patient taking differently: Take 300 mg by mouth daily) 30 tablet 0    amLODIPine (NORVASC) 10 mg tablet TAKE 1 TABLET BY MOUTH EVERY DAY 90 tablet 1    benazepril (LOTENSIN) 40 MG tablet TAKE 1 TABLET BY MOUTH EVERY DAY 90 tablet 1    desloratadine (CLARINEX) 5 MG tablet Take 1 tablet (5 mg total) by mouth daily 90 tablet 3    fluticasone (FLONASE) 50 mcg/act nasal spray 1 spray into each nostril daily 16 g 3    ibuprofen (MOTRIN) 800 mg tablet Take 1 tablet (800 mg total) by mouth 3 (three) times a day Take with meals 21 tablet 0    omeprazole (PriLOSEC) 20 mg delayed release capsule TAKE ONE CAPSULE 1/2 HOUR BEFORE BREAKFAST AND ONE CAPSULE 1/2 HOUR BEFORE DINNER 180 capsule 1    pravastatin (PRAVACHOL) 10 mg tablet TAKE 1 TABLET BY MOUTH EVERYDAY AT BEDTIME 90 tablet 1    montelukast (SINGULAIR) 10 mg tablet Take 1 tablet (10 mg total) by mouth daily at bedtime 90 tablet 3     No current facility-administered medications for this visit       Allergies   Allergen Reactions    Penicillins       Immunizations:     Immunization History   Administered Date(s) Administered    INFLUENZA 10/17/2017, 10/19/2018, 11/01/2021    Influenza, recombinant, quadrivalent,injectable, preservative free 09/22/2020    Pneumococcal Polysaccharide PPV23 09/22/2020      Health Maintenance:         Topic Date Due    Cervical Cancer Screening  09/11/2022 (Originally 11/9/1981)    Breast Cancer Screening: Mammogram  02/11/2023 (Originally 11/9/2002)    Colorectal Cancer Screening  11/01/2027    HIV Screening  Completed    Hepatitis C Screening  Completed         Topic Date Due    Pneumococcal Vaccine: Pediatrics (0 to 5 Years) and At-Risk Patients (6 to 59 Years) (2 - PCV) 09/22/2021    Influenza Vaccine (1) 09/01/2022      Medicare Screening Tests and Risk Assessments:     Chet Lo is here for her Subsequent Wellness visit  Last Medicare Wellness visit information reviewed, patient interviewed, no change since last AWV  Health Risk Assessment:   Patient rates overall health as good  Patient feels that their physical health rating is same  Patient is very satisfied with their life  Eyesight was rated as same  Hearing was rated as same  Patient feels that their emotional and mental health rating is same  Patients states they are never, rarely angry  Patient states they are sometimes unusually tired/fatigued  Pain experienced in the last 7 days has been none  Patient states that she has experienced no weight loss or gain in last 6 months  Depression Screening:   PHQ-2 Score: 0      Fall Risk Screening: In the past year, patient has experienced: no history of falling in past year      Urinary Incontinence Screening:   Patient has not leaked urine accidently in the last six months  Home Safety:  Patient does not have trouble with stairs inside or outside of their home  Patient has working smoke alarms and has working carbon monoxide detector  Home safety hazards include: none  Nutrition:   Current diet is Regular  Medications:   Patient is not currently taking any over-the-counter supplements  Patient is able to manage medications  Activities of Daily Living (ADLs)/Instrumental Activities of Daily Living (IADLs):   Walk and transfer into and out of bed and chair?: Yes  Dress and groom yourself?: Yes    Bathe or shower yourself?: Yes    Feed yourself? Yes  Do your laundry/housekeeping?: Yes  Manage your money, pay your bills and track your expenses?: Yes  Make your own meals?: Yes    Do your own shopping?: Yes    Previous Hospitalizations:   Any hospitalizations or ED visits within the last 12 months?: No      Advance Care Planning:   Living will: No    Durable POA for healthcare: No    Advanced directive: No      Comments: Does not want information on the above  Cognitive Screening:   Provider or family/friend/caregiver concerned regarding cognition?: No    PREVENTIVE SCREENINGS      Cardiovascular Screening:    General: Screening Current      Diabetes Screening:     General: Screening Not Indicated and History Diabetes      Colorectal Cancer Screening:     General: Screening Current      Breast Cancer Screening:     General: Patient Declines      Cervical Cancer Screening:    General: Patient Declines      Lung Cancer Screening:     General: Screening Not Indicated      Hepatitis C Screening:    General: Screening Current    Screening, Brief Intervention, and Referral to Treatment (SBIRT)    Screening  Typical number of drinks in a day: 0  Typical number of drinks in a week: 0  Interpretation: Low risk drinking behavior  Single Item Drug Screening:  How often have you used an illegal drug (including marijuana) or a prescription medication for non-medical reasons in the past year? never    Single Item Drug Screen Score: 0  Interpretation: Negative screen for possible drug use disorder    Brief Intervention  Alcohol & drug use screenings were reviewed  No concerns regarding substance use disorder identified       Other Counseling Topics:   Car/seat belt/driving safety, skin self-exam, sunscreen and calcium and vitamin D intake  No exam data present     Physical Exam:     /84 (BP Location: Left arm, Patient Position: Sitting, Cuff Size: Large)   Pulse 103   Ht 5' 8" (1 727 m)   Wt (!) 136 kg (300 lb 12 8 oz)   SpO2 97%   BMI 45 74 kg/m²     Physical Exam  Vitals and nursing note reviewed  Constitutional:       General: She is not in acute distress  Appearance: Normal appearance  She is obese  She is not ill-appearing  HENT:      Head: Normocephalic and atraumatic  Right Ear: Tympanic membrane, ear canal and external ear normal       Left Ear: Tympanic membrane, ear canal and external ear normal       Nose: Nose normal       Mouth/Throat:      Mouth: Mucous membranes are moist       Pharynx: Oropharynx is clear  No oropharyngeal exudate or posterior oropharyngeal erythema  Eyes:      General:         Right eye: No discharge  Left eye: No discharge  Conjunctiva/sclera: Conjunctivae normal       Pupils: Pupils are equal, round, and reactive to light  Cardiovascular:      Rate and Rhythm: Normal rate and regular rhythm  Pulses: Normal pulses  Heart sounds: Normal heart sounds  Pulmonary:      Effort: Pulmonary effort is normal  No respiratory distress  Breath sounds: Normal breath sounds  No stridor  No wheezing, rhonchi or rales  Musculoskeletal:         General: Normal range of motion  Cervical back: Normal, normal range of motion and neck supple  Thoracic back: Tenderness present  Lumbar back: Normal         Back:    Lymphadenopathy:      Cervical: No cervical adenopathy  Skin:     General: Skin is warm and dry  Neurological:      General: No focal deficit present  Mental Status: She is alert and oriented to person, place, and time  Mental status is at baseline  Psychiatric:         Mood and Affect: Mood normal          Behavior: Behavior normal          Thought Content:  Thought content normal           Tee Granados Mireya Keith

## 2022-08-11 ENCOUNTER — OFFICE VISIT (OUTPATIENT)
Dept: FAMILY MEDICINE CLINIC | Facility: MEDICAL CENTER | Age: 62
End: 2022-08-11
Payer: MEDICARE

## 2022-08-11 ENCOUNTER — APPOINTMENT (OUTPATIENT)
Dept: LAB | Facility: MEDICAL CENTER | Age: 62
End: 2022-08-11
Payer: MEDICARE

## 2022-08-11 VITALS
DIASTOLIC BLOOD PRESSURE: 84 MMHG | WEIGHT: 293 LBS | OXYGEN SATURATION: 97 % | HEART RATE: 103 BPM | HEIGHT: 68 IN | SYSTOLIC BLOOD PRESSURE: 118 MMHG | BODY MASS INDEX: 44.41 KG/M2

## 2022-08-11 DIAGNOSIS — Z13.220 LIPID SCREENING: ICD-10-CM

## 2022-08-11 DIAGNOSIS — D75.839 THROMBOCYTOSIS: ICD-10-CM

## 2022-08-11 DIAGNOSIS — Z13.21 ENCOUNTER FOR VITAMIN DEFICIENCY SCREENING: ICD-10-CM

## 2022-08-11 DIAGNOSIS — Z00.00 MEDICARE ANNUAL WELLNESS VISIT, SUBSEQUENT: Primary | ICD-10-CM

## 2022-08-11 DIAGNOSIS — I10 ESSENTIAL HYPERTENSION: ICD-10-CM

## 2022-08-11 DIAGNOSIS — E11.9 TYPE 2 DIABETES MELLITUS WITHOUT COMPLICATION, WITHOUT LONG-TERM CURRENT USE OF INSULIN (HCC): ICD-10-CM

## 2022-08-11 DIAGNOSIS — R79.9 ABNORMAL BLOOD CELL COUNT: ICD-10-CM

## 2022-08-11 DIAGNOSIS — R09.89 UPPER RESPIRATORY SYMPTOM: ICD-10-CM

## 2022-08-11 LAB
ALBUMIN SERPL BCP-MCNC: 3.8 G/DL (ref 3.5–5)
ALP SERPL-CCNC: 117 U/L (ref 46–116)
ALT SERPL W P-5'-P-CCNC: 34 U/L (ref 12–78)
ANION GAP SERPL CALCULATED.3IONS-SCNC: 5 MMOL/L (ref 4–13)
AST SERPL W P-5'-P-CCNC: 32 U/L (ref 5–45)
BASOPHILS # BLD AUTO: 0.07 THOUSANDS/ΜL (ref 0–0.1)
BASOPHILS NFR BLD AUTO: 1 % (ref 0–1)
BILIRUB SERPL-MCNC: 0.37 MG/DL (ref 0.2–1)
BUN SERPL-MCNC: 18 MG/DL (ref 5–25)
CALCIUM SERPL-MCNC: 9.8 MG/DL (ref 8.3–10.1)
CHLORIDE SERPL-SCNC: 105 MMOL/L (ref 96–108)
CHOLEST SERPL-MCNC: 138 MG/DL
CO2 SERPL-SCNC: 27 MMOL/L (ref 21–32)
CREAT SERPL-MCNC: 0.92 MG/DL (ref 0.6–1.3)
EOSINOPHIL # BLD AUTO: 0.44 THOUSAND/ΜL (ref 0–0.61)
EOSINOPHIL NFR BLD AUTO: 5 % (ref 0–6)
ERYTHROCYTE [DISTWIDTH] IN BLOOD BY AUTOMATED COUNT: 13.7 % (ref 11.6–15.1)
EST. AVERAGE GLUCOSE BLD GHB EST-MCNC: 148 MG/DL
GFR SERPL CREATININE-BSD FRML MDRD: 68 ML/MIN/1.73SQ M
GLUCOSE P FAST SERPL-MCNC: 124 MG/DL (ref 65–99)
HBA1C MFR BLD: 6.8 %
HCT VFR BLD AUTO: 44.6 % (ref 34.8–46.1)
HDLC SERPL-MCNC: 58 MG/DL
HGB BLD-MCNC: 13.9 G/DL (ref 11.5–15.4)
IMM GRANULOCYTES # BLD AUTO: 0.04 THOUSAND/UL (ref 0–0.2)
IMM GRANULOCYTES NFR BLD AUTO: 0 % (ref 0–2)
LDLC SERPL CALC-MCNC: 59 MG/DL (ref 0–100)
LYMPHOCYTES # BLD AUTO: 1.9 THOUSANDS/ΜL (ref 0.6–4.47)
LYMPHOCYTES NFR BLD AUTO: 21 % (ref 14–44)
MCH RBC QN AUTO: 29.1 PG (ref 26.8–34.3)
MCHC RBC AUTO-ENTMCNC: 31.2 G/DL (ref 31.4–37.4)
MCV RBC AUTO: 93 FL (ref 82–98)
MONOCYTES # BLD AUTO: 0.64 THOUSAND/ΜL (ref 0.17–1.22)
MONOCYTES NFR BLD AUTO: 7 % (ref 4–12)
NEUTROPHILS # BLD AUTO: 5.89 THOUSANDS/ΜL (ref 1.85–7.62)
NEUTS SEG NFR BLD AUTO: 66 % (ref 43–75)
NONHDLC SERPL-MCNC: 80 MG/DL
NRBC BLD AUTO-RTO: 0 /100 WBCS
PLATELET # BLD AUTO: 374 THOUSANDS/UL (ref 149–390)
PMV BLD AUTO: 11.3 FL (ref 8.9–12.7)
POTASSIUM SERPL-SCNC: 4.6 MMOL/L (ref 3.5–5.3)
PROT SERPL-MCNC: 8.4 G/DL (ref 6.4–8.4)
RBC # BLD AUTO: 4.78 MILLION/UL (ref 3.81–5.12)
SODIUM SERPL-SCNC: 137 MMOL/L (ref 135–147)
TRIGL SERPL-MCNC: 105 MG/DL
WBC # BLD AUTO: 8.98 THOUSAND/UL (ref 4.31–10.16)

## 2022-08-11 PROCEDURE — 83036 HEMOGLOBIN GLYCOSYLATED A1C: CPT

## 2022-08-11 PROCEDURE — 36415 COLL VENOUS BLD VENIPUNCTURE: CPT

## 2022-08-11 PROCEDURE — 85025 COMPLETE CBC W/AUTO DIFF WBC: CPT

## 2022-08-11 PROCEDURE — G0439 PPPS, SUBSEQ VISIT: HCPCS

## 2022-08-11 PROCEDURE — 80053 COMPREHEN METABOLIC PANEL: CPT

## 2022-08-11 PROCEDURE — 80061 LIPID PANEL: CPT

## 2022-08-11 NOTE — PROGRESS NOTES
Diabetic Foot Exam    Patient's shoes and socks removed  Right Foot/Ankle   Right Foot Inspection  Skin Exam: skin normal  No dry skin, no warmth, no callus, no erythema, no maceration, no abnormal color, no pre-ulcer, no ulcer and no callus  Toe Exam: ROM and strength within normal limits  Sensory   Monofilament testing: intact    Vascular  The right DP pulse is 2+  Left Foot/Ankle  Left Foot Inspection  Skin Exam: skin normal  Skin not intact, no dry skin, no warmth, no erythema, no maceration, normal color, no pre-ulcer, no ulcer and no callus  Toe Exam: ROM and strength within normal limits  Sensory   Monofilament testing: diminished    Vascular  The left DP pulse is 2+       Assign Risk Category  No deformity present  No loss of protective sensation  No weak pulses  Risk: 0

## 2022-08-12 ENCOUNTER — TELEPHONE (OUTPATIENT)
Dept: FAMILY MEDICINE CLINIC | Facility: MEDICAL CENTER | Age: 62
End: 2022-08-12

## 2022-08-12 DIAGNOSIS — E11.9 TYPE 2 DIABETES MELLITUS WITHOUT COMPLICATION, WITHOUT LONG-TERM CURRENT USE OF INSULIN (HCC): Primary | ICD-10-CM

## 2022-08-12 NOTE — TELEPHONE ENCOUNTER
----- Message from [address] [place] [address] sent at 8/12/2022 10:15 AM EDT -----  Please call patient and inform her A1c is still elevated, higher than 6 months ago when we checked it  She is in diabetic range and at this point it does not appear diet, exercise and lifestyle modifications have been helping to control these numbers as they continue to rise  At this point I recommend starting metformin twice daily for blood sugar control  This medication should be taken with food  Once in the morning with breakfast and once in the evening with dinner  I also recommend continuing to maintain a healthy diet with limiting sugars and carbohydrates  Increasing lean meats, fruits, vegetables  Increasing exercise and weight loss  I would like to see her back in the office in 3 months to recheck A1c instead of 6 months  If she is agreeable to all of this please let me know and I will send medication to her pharmacy and she should schedule 3 month follow-up

## 2022-08-12 NOTE — TELEPHONE ENCOUNTER
Pt aware and agreeable to Metformin  Please send to [facility] in [address]  Forwarded to clerical to make appt  Todd Estrada says the pharmacy lets her know when her rx's are ready so need for return call

## 2022-08-19 ENCOUNTER — TELEPHONE (OUTPATIENT)
Dept: HEMATOLOGY ONCOLOGY | Facility: CLINIC | Age: 62
End: 2022-08-19

## 2022-08-21 NOTE — PROGRESS NOTES
Hematology/Oncology Progress Note    Date of Service: 8/23/2022    [address] [facility] HEMATOLOGY [facility]   200   Marlon Quintana  [address] [place] [id number]    Hem/Onc Problem List:   1  Leukocytosis  2  Thrombocytosis  3  History of Rheumatoid Arthritis      Chief Complaint:    Follow up for #1 & #2 above     Assessment/Plan:   1  Leukocytosis, unspecified type  2  Thrombocytosis  Both patient's chronic leukocytosis and thrombocytosis have resolved  Most recent counts are showing normalized WBC and platelet count  Her prior CBC in 04/2022 also showed normalized counts  Her initial workup including JAK2 with reflex testing, leukemia/lymphoma flow cytometry,BCR-ABL testing were all negative  Patient has no constitutional symptoms or frequent infections  Patient can be seen as needed since counts have normalized on 2 occasions  3  Encounter for screening mammogram for malignant neoplasm of breast   Patient encouraged to have mammogram as she has never had this done  She will schedule the shortly  · Discussion of decision making    I personally reviewed the following lab results, the image studies, pathology, other specialty/physicians consult notes and recommendations, and outside medical records from [facility]  I had a lengthy discussion with the patient and shared the work-up findings  I spent 20 minutes reviewing the records (labs, clinician notes, outside records, medical history, ordering medicine/tests/procedures, interpreting the imaging/labs previously done) and coordination of care as well as direct time with the patient today, of which greater than 50% of the time was spent in counseling and coordination of care with the patient/family  · Plan/Labs  · Follow-up as needed  · Patient to schedule mammogram shortly    Follow Up: PRN    All questions were answered to the patient's satisfaction during this encounter   The patient knows the contact information for our office and knows to reach out for any relevant concerns related to this encounter  They are to call for any temperature 100 4 or higher, new symptoms including but not restricted to shaking chills, decreased appetite, nausea, vomiting, diarrhea, increased fatigue, shortness of breath or chest pain, confusion, and not feeling the strength to come to the clinic  For all other listed problems and medical diagnosis in their chart - they are managed by PCP and/or other specialists, which the patient acknowledges  Thank you very much for your consultation and making us a part of this patient's care  We are continuing to follow closely with you  Please do not hesitate to reach out to me with any additional questions or concerns  AJCC 8th Edition Cancer Stage :      Cancer Staging  No matching staging information was found for the patient  Hematology/Oncology History:   · Chronic leukocytosis with WBC between 10-16  since Nellie 10, 2015  · March 15, 2022, WBC 15 33, hemoglobin 15 2, platelet count 823  Absolute basal cell 0 14, absolute neutrophil 9 46      · April 25, 2022, WBC 8 55, Hgb 14, MCV 92, PLT 350K, abs eosinophil 0 62, other diff normal  BCR-ABL negative  Leukemia/lymphoma flow negative  Sed rate 81  CRP 32 1  JAK2 with reflex to CALR, MPL negative  · 08/11/2022:  WBC 8 98, hemoglobin 13 9, MCV 93, platelets 348 K, differential normal      History of Present Illiness:   Harsha Tierney is a 63 y o  female with the above-noted HemOnc history who is here  to follow up regarding leukocytosis, thrombocytosis  Mild elevation seen since at least 2019  Initially WBC was between 10-16     08/11/2022:  WBC 8 98, hemoglobin 13 9, MCV 93, platelets 037 K, differential normal   Patient's prior CBC in 4/22 also showed normal counts  Patient has no constitutional symptoms or frequent infections  Feels well    She did have a gout flare up earlier this year and she had to take steroids for this   Likely this was cause acute increasing counts  She also has history of rheumatoid arthritis, chronic osteoarthritis of her spine which could be cause of leukocytosis chronically as well  Patient feels fine  No fever or chills  No exertional chest pain, diaphoresis or shortness  of breath  No cough and phlegm, no hemoptysis  Patient denied nausea  and vomiting  No abdominal pain  No diarrhea or constipation  No  symptoms  No headache or blurred vision  No seizure activity  Appetite good  No significant weight loss or weight gain  The patient denies bleeding anywhere  ROS: A 12-point of review of systems is obtained and other than the above is noncontributory  Objective:   VITALS:   /78   Pulse 92   Temp 98 1 °F (36 7 °C)   Resp 16   Ht 5' 8" (1 727 m)   Wt 135 kg (297 lb)   SpO2 98%   BMI 45 16 kg/m²     Physical EXAM:  General:  Alert, cooperative, no distress, appears stated age  Head:  Normocephalic, without obvious abnormality, atraumatic  Eyes:  Conjunctivae/corneas clear  PERRL, EOMs intact  No evidence of conjunctivitis     Throat: Lips, mucosa, and tongue normal   No bleeding from mouth  Neck: Supple, symmetrical, trachea midline    Lungs:     Respiratory effort easy, nonlabored    Heart:  Regular rate and rhythm, S1, S2 [place]  Abdomen:   Soft, non-tender,nondistended  No masses,  No organomegaly  Extremities:  Lymphatics: Extremities normal, atraumatic, no cyanosis or edema  No cervical, axillary or inguinal adenopathy   Skin: Skin color, texture, turgor normal  No rashes  Neurologic: A&Ox4   No focal neuro deficits       Allergies   Allergen Reactions    Penicillins        Past Medical History:   Diagnosis Date    Diabetes mellitus ([address] )     Fatty liver     GERD (gastroesophageal reflux disease)     Hypertension        Past Surgical History:   Procedure Laterality Date    BACK SURGERY      FINGER SURGERY      SINUS SURGERY      TUBAL LIGATION         Family History   Problem Relation Age of Onset    Heart disease Mother     Atrial fibrillation Mother     Thyroid disease Mother     Coronary artery disease Father     Diabetes Father        Social History     Socioeconomic History    Marital status: Single     Spouse name: Not on file    Number of children: Not on file    Years of education: Not on file    Highest education level: Not on file   Occupational History    Not on file   Tobacco Use    Smoking status: Never Smoker    Smokeless tobacco: Never Used   Vaping Use    Vaping Use: Never used   Substance and Sexual Activity    Alcohol use: Never    Drug use: Never    Sexual activity: Not on file   Other Topics Concern    Not on file   Social History Narrative    Not on file     Social Determinants of Health     Financial Resource Strain: Low Risk     Difficulty of Paying Living Expenses: Not hard at all   Food Insecurity: Not on file   Transportation Needs: No Transportation Needs    Lack of Transportation (Medical): No    Lack of Transportation (Non-Medical):  No   Physical Activity: Not on file   Stress: Not on file   Social Connections: Not on file   Intimate Partner Violence: Not on file   Housing Stability: Not on file       Current Outpatient Medications   Medication Sig Dispense Refill    allopurinol (ZYLOPRIM) 100 mg tablet TAKE 1 TABLET BY MOUTH EVERY DAY (Patient taking differently: Take 300 mg by mouth daily) 30 tablet 0    amLODIPine (NORVASC) 10 mg tablet TAKE 1 TABLET BY MOUTH EVERY DAY 90 tablet 1    benazepril (LOTENSIN) 40 MG tablet TAKE 1 TABLET BY MOUTH EVERY DAY 90 tablet 1    fluticasone (FLONASE) 50 mcg/act nasal spray 1 spray into each nostril daily 16 g 3    ibuprofen (MOTRIN) 800 mg tablet Take 1 tablet (800 mg total) by mouth 3 (three) times a day Take with meals 21 tablet 0    metFORMIN (GLUCOPHAGE) 500 mg tablet Take 1 tablet (500 mg total) by mouth 2 (two) times a day with meals 180 tablet 1  omeprazole (PriLOSEC) 20 mg delayed release capsule TAKE ONE CAPSULE 1/2 HOUR BEFORE BREAKFAST AND ONE CAPSULE 1/2 HOUR BEFORE DINNER 180 capsule 1    pravastatin (PRAVACHOL) 10 mg tablet TAKE 1 TABLET BY MOUTH EVERYDAY AT BEDTIME 90 tablet 1    desloratadine (CLARINEX) 5 MG tablet Take 1 tablet (5 mg total) by mouth daily 90 tablet 3    montelukast (SINGULAIR) 10 mg tablet Take 1 tablet (10 mg total) by mouth daily at bedtime 90 tablet 3     No current facility-administered medications for this visit  (Not in a hospital admission)      DATA REVIEW:    Pathology Result:    No results found for: [facility]     Image Results: They are reviewed and documented in Hematology/Oncology history    US abdomen complete  1 2 [id number] 1 [id number]  3 [id number]        LABS:  Lab data are reviewed and documented in HemOnc history  No results found for this or any previous visit (from the past 48 hour(s))            Olive Alejandro PA-C  8/23/2022, 10:24 AM

## 2022-08-22 ENCOUNTER — TELEPHONE (OUTPATIENT)
Dept: HEMATOLOGY ONCOLOGY | Facility: CLINIC | Age: 62
End: 2022-08-22

## 2022-08-22 NOTE — TELEPHONE ENCOUNTER
----- Message from Wil Kennedy MA sent at 8/19/2022  3:36 PM EDT -----  Regarding: MAMMO  COULD ONE OF YOU PLEASE GET PATIENT SCHEDULED FOR HER MAMMO  NOT URGENT  THANK YOU!

## 2022-08-22 NOTE — TELEPHONE ENCOUNTER
Spoke with patient about scheduling mammogram with her  Patient states for the time being she would like to skip it and not schedule it

## 2022-08-23 ENCOUNTER — OFFICE VISIT (OUTPATIENT)
Dept: HEMATOLOGY ONCOLOGY | Facility: CLINIC | Age: 62
End: 2022-08-23
Payer: MEDICARE

## 2022-08-23 VITALS
SYSTOLIC BLOOD PRESSURE: 118 MMHG | DIASTOLIC BLOOD PRESSURE: 78 MMHG | RESPIRATION RATE: 16 BRPM | TEMPERATURE: 98.1 F | HEART RATE: 92 BPM | BODY MASS INDEX: 44.41 KG/M2 | HEIGHT: 68 IN | OXYGEN SATURATION: 98 % | WEIGHT: 293 LBS

## 2022-08-23 DIAGNOSIS — D72.829 LEUKOCYTOSIS, UNSPECIFIED TYPE: Primary | ICD-10-CM

## 2022-08-23 DIAGNOSIS — Z12.31 ENCOUNTER FOR SCREENING MAMMOGRAM FOR MALIGNANT NEOPLASM OF BREAST: ICD-10-CM

## 2022-08-23 DIAGNOSIS — D75.839 THROMBOCYTOSIS: ICD-10-CM

## 2022-08-23 PROCEDURE — 99213 OFFICE O/P EST LOW 20 MIN: CPT | Performed by: INTERNAL MEDICINE

## 2022-09-02 ENCOUNTER — TELEPHONE (OUTPATIENT)
Dept: FAMILY MEDICINE CLINIC | Facility: MEDICAL CENTER | Age: 62
End: 2022-09-02

## 2022-09-02 NOTE — TELEPHONE ENCOUNTER
Pt said that she was recently diagnosed with diabetes  She would like to know if she can have a prescription for a glucometer sent to [address] [place] [address] so she can check her sugars at home  Pt will check with her insurance company to see which brands would be covered  Pt aware that you are out until Tuesday

## 2022-09-06 NOTE — TELEPHONE ENCOUNTER
Please call patient and inform her Glucometer is not necessary as she is on metformin, not insulin and her A1c is under 7 0%  However, if she would like a glucometer to monitor her home bs readings we can prescribe one but I would like her to see a diabetic educator in addition so she can go over how to use glucometer, when to check her sugar and modifications she can make in the event she gets high bs readings as she is not on any type of insulin or sliding scale  If she is interested you can go ahead and place order for glucometer, supplies and diabetic education referral   Thanks

## 2022-09-30 DIAGNOSIS — E11.9 TYPE 2 DIABETES MELLITUS WITHOUT COMPLICATION, WITHOUT LONG-TERM CURRENT USE OF INSULIN (HCC): ICD-10-CM

## 2022-09-30 DIAGNOSIS — I10 ESSENTIAL HYPERTENSION: ICD-10-CM

## 2022-09-30 RX ORDER — PRAVASTATIN SODIUM 10 MG
TABLET ORAL
Qty: 90 TABLET | Refills: 1 | Status: SHIPPED | OUTPATIENT
Start: 2022-09-30

## 2022-09-30 RX ORDER — BENAZEPRIL HYDROCHLORIDE 40 MG/1
TABLET, FILM COATED ORAL
Qty: 90 TABLET | Refills: 1 | Status: SHIPPED | OUTPATIENT
Start: 2022-09-30

## 2022-09-30 RX ORDER — AMLODIPINE BESYLATE 10 MG/1
TABLET ORAL
Qty: 90 TABLET | Refills: 1 | Status: SHIPPED | OUTPATIENT
Start: 2022-09-30

## 2022-10-24 ENCOUNTER — TELEPHONE (OUTPATIENT)
Dept: GASTROENTEROLOGY | Facility: CLINIC | Age: 62
End: 2022-10-24

## 2022-10-27 DIAGNOSIS — J30.89 ENVIRONMENTAL AND SEASONAL ALLERGIES: ICD-10-CM

## 2022-10-27 RX ORDER — DESLORATADINE 5 MG/1
TABLET ORAL
Qty: 90 TABLET | Refills: 3 | Status: SHIPPED | OUTPATIENT
Start: 2022-10-27

## 2022-10-27 RX ORDER — FLUTICASONE PROPIONATE 50 MCG
SPRAY, SUSPENSION (ML) NASAL
Qty: 16 G | Refills: 3 | Status: SHIPPED | OUTPATIENT
Start: 2022-10-27

## 2022-10-27 RX ORDER — MONTELUKAST SODIUM 10 MG/1
TABLET ORAL
Qty: 90 TABLET | Refills: 3 | Status: SHIPPED | OUTPATIENT
Start: 2022-10-27

## 2022-11-03 ENCOUNTER — RA CDI HCC (OUTPATIENT)
Dept: OTHER | Facility: HOSPITAL | Age: 62
End: 2022-11-03

## 2022-11-11 ENCOUNTER — APPOINTMENT (OUTPATIENT)
Dept: LAB | Facility: MEDICAL CENTER | Age: 62
End: 2022-11-11

## 2022-11-11 ENCOUNTER — OFFICE VISIT (OUTPATIENT)
Dept: FAMILY MEDICINE CLINIC | Facility: MEDICAL CENTER | Age: 62
End: 2022-11-11

## 2022-11-11 VITALS
HEIGHT: 68 IN | HEART RATE: 100 BPM | BODY MASS INDEX: 44.41 KG/M2 | SYSTOLIC BLOOD PRESSURE: 120 MMHG | WEIGHT: 293 LBS | TEMPERATURE: 98 F | DIASTOLIC BLOOD PRESSURE: 82 MMHG | OXYGEN SATURATION: 96 %

## 2022-11-11 DIAGNOSIS — R10.10 UPPER ABDOMINAL PAIN: ICD-10-CM

## 2022-11-11 DIAGNOSIS — E11.9 TYPE 2 DIABETES MELLITUS WITHOUT COMPLICATION, WITHOUT LONG-TERM CURRENT USE OF INSULIN (HCC): Primary | ICD-10-CM

## 2022-11-11 DIAGNOSIS — Z23 IMMUNIZATION DUE: ICD-10-CM

## 2022-11-11 DIAGNOSIS — R11.0 NAUSEA: ICD-10-CM

## 2022-11-11 LAB
ALBUMIN SERPL BCP-MCNC: 3.5 G/DL (ref 3.5–5)
ALP SERPL-CCNC: 119 U/L (ref 46–116)
ALT SERPL W P-5'-P-CCNC: 33 U/L (ref 12–78)
ANION GAP SERPL CALCULATED.3IONS-SCNC: 5 MMOL/L (ref 4–13)
AST SERPL W P-5'-P-CCNC: 24 U/L (ref 5–45)
BASOPHILS # BLD AUTO: 0.1 THOUSANDS/ÂΜL (ref 0–0.1)
BASOPHILS NFR BLD AUTO: 1 % (ref 0–1)
BILIRUB SERPL-MCNC: 0.48 MG/DL (ref 0.2–1)
BILIRUB UR QL STRIP: NEGATIVE
BUN SERPL-MCNC: 16 MG/DL (ref 5–25)
CALCIUM SERPL-MCNC: 9.9 MG/DL (ref 8.3–10.1)
CHLORIDE SERPL-SCNC: 103 MMOL/L (ref 96–108)
CLARITY UR: CLEAR
CO2 SERPL-SCNC: 27 MMOL/L (ref 21–32)
COLOR UR: NORMAL
CREAT SERPL-MCNC: 0.91 MG/DL (ref 0.6–1.3)
EOSINOPHIL # BLD AUTO: 0.48 THOUSAND/ÂΜL (ref 0–0.61)
EOSINOPHIL NFR BLD AUTO: 5 % (ref 0–6)
ERYTHROCYTE [DISTWIDTH] IN BLOOD BY AUTOMATED COUNT: 13.8 % (ref 11.6–15.1)
GFR SERPL CREATININE-BSD FRML MDRD: 68 ML/MIN/1.73SQ M
GLUCOSE SERPL-MCNC: 119 MG/DL (ref 65–140)
GLUCOSE UR STRIP-MCNC: NEGATIVE MG/DL
HCT VFR BLD AUTO: 43.1 % (ref 34.8–46.1)
HGB BLD-MCNC: 13.7 G/DL (ref 11.5–15.4)
HGB UR QL STRIP.AUTO: NEGATIVE
IMM GRANULOCYTES # BLD AUTO: 0.04 THOUSAND/UL (ref 0–0.2)
IMM GRANULOCYTES NFR BLD AUTO: 0 % (ref 0–2)
KETONES UR STRIP-MCNC: NEGATIVE MG/DL
LEUKOCYTE ESTERASE UR QL STRIP: NEGATIVE
LIPASE SERPL-CCNC: 127 U/L (ref 73–393)
LYMPHOCYTES # BLD AUTO: 2.38 THOUSANDS/ÂΜL (ref 0.6–4.47)
LYMPHOCYTES NFR BLD AUTO: 23 % (ref 14–44)
MCH RBC QN AUTO: 29.8 PG (ref 26.8–34.3)
MCHC RBC AUTO-ENTMCNC: 31.8 G/DL (ref 31.4–37.4)
MCV RBC AUTO: 94 FL (ref 82–98)
MONOCYTES # BLD AUTO: 0.8 THOUSAND/ÂΜL (ref 0.17–1.22)
MONOCYTES NFR BLD AUTO: 8 % (ref 4–12)
NEUTROPHILS # BLD AUTO: 6.8 THOUSANDS/ÂΜL (ref 1.85–7.62)
NEUTS SEG NFR BLD AUTO: 63 % (ref 43–75)
NITRITE UR QL STRIP: NEGATIVE
NRBC BLD AUTO-RTO: 0 /100 WBCS
PH UR STRIP.AUTO: 6 [PH]
PLATELET # BLD AUTO: 408 THOUSANDS/UL (ref 149–390)
PMV BLD AUTO: 11.2 FL (ref 8.9–12.7)
POTASSIUM SERPL-SCNC: 4.7 MMOL/L (ref 3.5–5.3)
PROT SERPL-MCNC: 8.4 G/DL (ref 6.4–8.4)
PROT UR STRIP-MCNC: NEGATIVE MG/DL
RBC # BLD AUTO: 4.6 MILLION/UL (ref 3.81–5.12)
SL AMB POCT HEMOGLOBIN AIC: 6.2 (ref ?–6.5)
SODIUM SERPL-SCNC: 135 MMOL/L (ref 135–147)
SP GR UR STRIP.AUTO: 1.02 (ref 1–1.03)
UROBILINOGEN UR STRIP-ACNC: <2 MG/DL
WBC # BLD AUTO: 10.6 THOUSAND/UL (ref 4.31–10.16)

## 2022-11-11 RX ORDER — DICYCLOMINE HYDROCHLORIDE 10 MG/1
10 CAPSULE ORAL
Qty: 30 CAPSULE | Refills: 0 | Status: SHIPPED | OUTPATIENT
Start: 2022-11-11

## 2022-11-11 RX ORDER — ONDANSETRON 4 MG/1
4 TABLET, ORALLY DISINTEGRATING ORAL EVERY 6 HOURS PRN
Qty: 20 TABLET | Refills: 0 | Status: SHIPPED | OUTPATIENT
Start: 2022-11-11

## 2022-11-11 NOTE — PROGRESS NOTES
Assessment/Plan:    Check labs, urine, abdominal ultrasound for upper abdominal pain complaint  Continue Metformin and healthy diet as discussed  Call if symptoms worsen or persist   Follow up in 3 months as scheduled  1  Type 2 diabetes mellitus without complication, without long-term current use of insulin (HCC)  A1c improving  Continue metformin 500 mg b i d   Advised patient to continue healthy diet with limiting carbohydrate and sugar intake  Component      Latest Ref Rng & Units 11/11/2022   Hemoglobin A1C      6 5 6 2   - POCT hemoglobin A1c    2  Upper abdominal pain  Check blood work and urine  Check abdominal ultrasound  Dicyclomine and Zofran prescribed  Advised patient to have labs and ultrasound done asap  Call the office if symptoms worsen or persist    Advised patient to proceed to ER if symptoms worsen such as severe abdominal pain, fever, unable to tolerate p o  Intake  - CBC and differential; Future  - Comprehensive metabolic panel; Future  - UA w Reflex to Microscopic w Reflex to Culture  - Lipase; Future  - dicyclomine (BENTYL) 10 mg capsule; Take 1 capsule (10 mg total) by mouth 4 (four) times a day (before meals and at bedtime)  Dispense: 30 capsule; Refill: 0  - US abdomen complete; Future    3  Nausea  Zofran 4 mg as needed for nausea  Advised patient to take this medication at least 15-20 minutes prior to p o  Intake  - ondansetron (Zofran ODT) 4 mg disintegrating tablet; Take 1 tablet (4 mg total) by mouth every 6 (six) hours as needed for nausea or vomiting  Dispense: 20 tablet; Refill: 0    4  Immunization due  Influenza vaccine updated today, tolerated well  - influenza vaccine, quadrivalent, recombinant, PF, 0 5 mL, for patients 18 yr+ (FLUBLOK)      Subjective:      Patient ID: Kimberly Eid is a 63 y o  female  26-year-old female presents for follow-up diabetes  She was started on Metformin 500 mg bid after her last office visit   She is tolerating medication well  She is still having some diarrhea once a day usually in the morning  Her A1c has improved, prior was 6 6% and is now 6 2%  She is complaining of constant generalized upper abdominal pain x 3 days with associated nausea and vomited once 3 days ago  She also reports fever, TMAX 102 on first day of abdominal pain which and since improved  She is having some diarrhea however this started when she started Metformin 3 months ago  Denies blood in stool, denies urinary symptoms  She has not taken any medications for the abdominal pain  No triggers for the pain  Pain is described as sharp with a 9/10 pain in severity  She also feels bloated  She does have GERD  She takes Omeprazole 20 mg bid  The following portions of the patient's history were reviewed and updated as appropriate: allergies, current medications, past family history, past medical history, past social history and problem list     Review of Systems   Constitutional: Negative  HENT: Negative  Eyes: Negative  Respiratory: Negative  Cardiovascular: Negative  Gastrointestinal: Positive for abdominal distention, abdominal pain (upper abdominal pain), diarrhea and nausea  Negative for constipation and vomiting  Endocrine: Negative  Genitourinary: Negative  Musculoskeletal: Negative  Skin: Negative  Allergic/Immunologic: Negative  Neurological: Negative  Hematological: Negative  Psychiatric/Behavioral: Negative  Objective:      /82 (BP Location: Left arm, Patient Position: Sitting, Cuff Size: Large)   Pulse 100   Temp 98 °F (36 7 °C) (Oral)   Ht 5' 8" (1 727 m)   Wt 134 kg (296 lb)   SpO2 96%   BMI 45 01 kg/m²          Physical Exam  Vitals and nursing note reviewed  Constitutional:       General: She is not in acute distress  Appearance: Normal appearance  She is obese  She is not ill-appearing  HENT:      Head: Normocephalic and atraumatic     Eyes:      Conjunctiva/sclera: Conjunctivae normal       Pupils: Pupils are equal, round, and reactive to light  Cardiovascular:      Rate and Rhythm: Normal rate and regular rhythm  Pulses: Normal pulses  Heart sounds: Normal heart sounds  No murmur heard  Pulmonary:      Effort: Pulmonary effort is normal  No respiratory distress  Breath sounds: Normal breath sounds  No stridor  No wheezing, rhonchi or rales  Abdominal:      General: Bowel sounds are normal  There is distension  Palpations: Abdomen is soft  Tenderness: There is abdominal tenderness (diffuse upper abdominal tenderness)  Musculoskeletal:         General: Normal range of motion  Cervical back: Normal range of motion and neck supple  Skin:     General: Skin is warm and dry  Neurological:      General: No focal deficit present  Mental Status: She is alert and oriented to person, place, and time  Mental status is at baseline  Psychiatric:         Mood and Affect: Mood normal          Behavior: Behavior normal          Thought Content:  Thought content normal                     Asha Bartlett

## 2022-11-14 ENCOUNTER — HOSPITAL ENCOUNTER (OUTPATIENT)
Dept: RADIOLOGY | Facility: MEDICAL CENTER | Age: 62
Discharge: HOME | End: 2022-11-14

## 2022-11-14 DIAGNOSIS — R10.10 UPPER ABDOMINAL PAIN: ICD-10-CM

## 2022-11-15 ENCOUNTER — TELEPHONE (OUTPATIENT)
Dept: FAMILY MEDICINE CLINIC | Facility: MEDICAL CENTER | Age: 62
End: 2022-11-15

## 2022-11-15 NOTE — TELEPHONE ENCOUNTER
----- Message from [address] [place] [address] sent at 11/15/2022 12:26 PM EST -----  Please call patient and inform her labs are overall unremarkable including urine testing  Ultrasound of abdomen result is still pending and I will contact her once I have results for this  How is she feeling?

## 2022-11-15 NOTE — TELEPHONE ENCOUNTER
Are the medications I prescribed helping? Again, I will call with ultrasound results once we have them, still not read by Radiology

## 2022-11-18 ENCOUNTER — TELEPHONE (OUTPATIENT)
Dept: FAMILY MEDICINE CLINIC | Facility: MEDICAL CENTER | Age: 62
End: 2022-11-18

## 2022-11-18 NOTE — TELEPHONE ENCOUNTER
----- Message from [address] [place] [address] sent at 11/18/2022  8:29 AM EST -----  Please inform patient ultrasound shows fatty liver which is likely chronic, not new  This is usually caused by the foods you eat and your weight  It also shows some bowel gas  This may be contributing to her pain  She can try gas-x, walking after meals, eliminate foods with lactose, high fat meals  Otherwise, ultrasound is unremarkable

## 2022-11-21 ENCOUNTER — TELEPHONE (OUTPATIENT)
Dept: GASTROENTEROLOGY | Facility: CLINIC | Age: 62
End: 2022-11-21

## 2022-11-21 NOTE — TELEPHONE ENCOUNTER
Initiated prior auth for Omeprazole 20 mg daily through CoverMyMeds  Key: [id number]    Awaiting response

## 2022-12-26 DIAGNOSIS — E11.9 TYPE 2 DIABETES MELLITUS WITHOUT COMPLICATION, WITHOUT LONG-TERM CURRENT USE OF INSULIN (HCC): ICD-10-CM

## 2022-12-29 DIAGNOSIS — I10 ESSENTIAL HYPERTENSION: ICD-10-CM

## 2022-12-29 RX ORDER — AMLODIPINE BESYLATE 10 MG/1
TABLET ORAL
Qty: 90 TABLET | Refills: 1 | Status: SHIPPED | OUTPATIENT
Start: 2022-12-29

## 2023-01-05 ENCOUNTER — OFFICE VISIT (OUTPATIENT)
Dept: GASTROENTEROLOGY | Facility: CLINIC | Age: 63
End: 2023-01-05

## 2023-01-05 ENCOUNTER — TELEPHONE (OUTPATIENT)
Dept: GASTROENTEROLOGY | Facility: CLINIC | Age: 63
End: 2023-01-05

## 2023-01-05 VITALS
BODY MASS INDEX: 44.41 KG/M2 | HEIGHT: 68 IN | SYSTOLIC BLOOD PRESSURE: 127 MMHG | WEIGHT: 293 LBS | DIASTOLIC BLOOD PRESSURE: 89 MMHG | HEART RATE: 115 BPM

## 2023-01-05 DIAGNOSIS — K21.9 GASTROESOPHAGEAL REFLUX DISEASE, UNSPECIFIED WHETHER ESOPHAGITIS PRESENT: Primary | ICD-10-CM

## 2023-01-05 DIAGNOSIS — Z86.010 HISTORY OF COLON POLYPS: ICD-10-CM

## 2023-01-05 DIAGNOSIS — K76.0 FATTY LIVER: ICD-10-CM

## 2023-01-05 DIAGNOSIS — R16.0 HEPATOMEGALY: ICD-10-CM

## 2023-01-05 DIAGNOSIS — R10.13 DYSPEPSIA: ICD-10-CM

## 2023-01-05 DIAGNOSIS — K57.90 DIVERTICULAR DISEASE: ICD-10-CM

## 2023-01-05 DIAGNOSIS — R14.0 BLOATING: ICD-10-CM

## 2023-01-05 RX ORDER — POLYETHYLENE GLYCOL 3350, SODIUM SULFATE ANHYDROUS, SODIUM BICARBONATE, SODIUM CHLORIDE, POTASSIUM CHLORIDE 236; 22.74; 6.74; 5.86; 2.97 G/4L; G/4L; G/4L; G/4L; G/4L
4 POWDER, FOR SOLUTION ORAL ONCE
Qty: 4000 ML | Refills: 0 | Status: SHIPPED | OUTPATIENT
Start: 2023-01-05 | End: 2023-01-05

## 2023-01-05 NOTE — PROGRESS NOTES
[facility] - Outpatient Consultation  Reji Anna 63 y o  female MRN: [id number]  Encounter: [id number]          ASSESSMENT AND PLAN:      1  Gastroesophageal reflux disease, unspecified whether esophagitis present    2  Bloating    3  Diverticular disease    4  Dyspepsia    5  Gastroesophageal reflux disease without esophagitis    6  Fatty liver    7  Hepatomegaly    8  BMI 45 0-49 9, adult (HCC)      History of GERD, bloating dyspepsia, and most likely is hiatal hernia/esophagitis  Antireflux measures reviewed diet discussed avoid late night meals and try to lose weight  Schedule EGD, does have a history of H  pylori gastritis  History of colon polyps, last exam 5 years ago, 5-year follow-up colonoscopy discussed and scheduled  Procedure and prep discussed at length  History of fatty liver, hepatomegaly, consequences and advanced liver disease discussed, encourage her to lose some weight  Better control diabetes and hyperlipidemia  Suggest bariatric evaluation as well      ______________________________________________________________________    HPI:      Patient came in for evaluation of her multiple GI symptoms, she complains of fullness bloating dyspepsia acid reflux heartburn indigestion appetite is fair weight is fluctuates, denies any blood in stools melena hematochezia bowels sometimes constipated  Denies any chest pain shortness of breath, no history of CVA CAD CAD stents pacemakers  Trying to consume more fluid and fiber in the diet, trying to watch her diet and may be some lose some weight  Diet medications more than 10 pertinent systems some of the prior records noted  REVIEW OF SYSTEMS:    CONSTITUTIONAL: Denies any fever, chills, rigors, and weight loss  HEENT: No earache or tinnitus  CARDIOVASCULAR: No chest pain or palpitations  RESPIRATORY: Denies any cough, hemoptysis, shortness of breath or dyspnea on exertion    GASTROINTESTINAL: As noted in the History of Present Illness  GENITOURINARY: Denies any hematuria or dysuria  NEUROLOGIC: No dizziness or vertigo  MUSCULOSKELETAL: Denies any joint swellings  SKIN: Denies skin rashes or itching  ENDOCRINE: Denies excessive thirst  Denies intolerance to heat or cold  PSYCHOSOCIAL: Denies depression or anxiety  Denies any recent memory loss  Historical Information   Past Medical History:   Diagnosis Date   • Colon polyp    • Diabetes mellitus ([address] )    • Fatty liver    • GERD (gastroesophageal reflux disease)    • Hypertension      Past Surgical History:   Procedure Laterality Date   • BACK SURGERY     • COLONOSCOPY     • FINGER SURGERY     • SINUS SURGERY     • TUBAL LIGATION     • UPPER GASTROINTESTINAL ENDOSCOPY       Social History   Social History     Substance and Sexual Activity   Alcohol Use Never     Social History     Substance and Sexual Activity   Drug Use Never     Social History     Tobacco Use   Smoking Status Never   Smokeless Tobacco Never     Family History   Problem Relation Age of Onset   • Heart disease Mother    • Atrial fibrillation Mother    • Thyroid disease Mother    • Coronary artery disease Father    • Diabetes Father        Meds/Allergies       Current Outpatient Medications:   •  allopurinol (ZYLOPRIM) 100 mg tablet  •  amLODIPine (NORVASC) 10 mg tablet  •  benazepril (LOTENSIN) 40 MG tablet  •  desloratadine (CLARINEX) 5 MG tablet  •  fluticasone (FLONASE) 50 mcg/act nasal spray  •  ibuprofen (MOTRIN) 800 mg tablet  •  metFORMIN (GLUCOPHAGE) 500 mg tablet  •  montelukast (SINGULAIR) 10 mg tablet  •  dicyclomine (BENTYL) 10 mg capsule  •  omeprazole (PriLOSEC) 20 mg delayed release capsule  •  ondansetron (Zofran ODT) 4 mg disintegrating tablet  •  pravastatin (PRAVACHOL) 10 mg tablet    Allergies   Allergen Reactions   • Penicillins            Objective     Blood pressure 127/89, pulse (!) 115, height 5' 8" (1 727 m), weight 136 kg (300 lb)   Body mass index is 45 61 kg/m²         PHYSICAL EXAM:      General Appearance:   Alert, cooperative, no distress   HEENT:   Normocephalic, atraumatic, anicteric  Neck:  Supple, symmetrical, trachea midline   Lungs:   Clear to auscultation bilaterally; no rales, rhonchi or wheezing; respirations unlabored    Heart[de-identified]   Regular rate and rhythm; no murmur  Abdomen:   Soft, non-tender, non-distended; normal bowel sounds; no masses, no organomegaly    Genitalia:   Deferred    Rectal:   Deferred    Extremities:  No cyanosis, clubbing or edema    Skin:  No jaundice, rashes, or lesions    Lymph nodes:  No palpable cervical lymphadenopathy        Lab Results:   No visits with results within 1 Day(s) from this visit     Latest known visit with results is:   Appointment on 11/11/2022   Component Date Value   • WBC 11/11/2022 10 60 (H)    • RBC 11/11/2022 4 60    • Hemoglobin 11/11/2022 13 7    • Hematocrit 11/11/2022 43 1    • MCV 11/11/2022 94    • MCH 11/11/2022 29 8    • MCHC 11/11/2022 31 8    • RDW 11/11/2022 13 8    • MPV 11/11/2022 11 2    • Platelets 03/24/3893 408 (H)    • nRBC 11/11/2022 0    • Neutrophils Relative 11/11/2022 63    • Immat GRANS % 11/11/2022 0    • Lymphocytes Relative 11/11/2022 23    • Monocytes Relative 11/11/2022 8    • Eosinophils Relative 11/11/2022 5    • Basophils Relative 11/11/2022 1    • Neutrophils Absolute 11/11/2022 6 80    • Immature Grans Absolute 11/11/2022 0 04    • Lymphocytes Absolute 11/11/2022 2 38    • Monocytes Absolute 11/11/2022 0 80    • Eosinophils Absolute 11/11/2022 0 48    • Basophils Absolute 11/11/2022 0 10    • Sodium 11/11/2022 135    • Potassium 11/11/2022 4 7    • Chloride 11/11/2022 103    • CO2 11/11/2022 27    • ANION GAP 11/11/2022 5    • BUN 11/11/2022 16    • Creatinine 11/11/2022 0 91    • Glucose 11/11/2022 119    • Calcium 11/11/2022 9 9    • AST 11/11/2022 24    • ALT 11/11/2022 33    • Alkaline Phosphatase 11/11/2022 119 (H)    • Total Protein 11/11/2022 8 4    • Albumin 11/11/2022 3 5    • Total Bilirubin 11/11/2022 0 48    • eGFR 11/11/2022 68    • Lipase 11/11/2022 127          Radiology Results:   No results found

## 2023-01-05 NOTE — TELEPHONE ENCOUNTER
Scheduled date of EGD/colonoscopy (as of today):1/24/23  Physician performing EGD/colonoscopy:Holly  Location of EGD/colonoscopy:  Desired bowel prep reviewed with patient:Leticia  Instructions reviewed with patient by:Dimple ROJAS  Clearances:  None

## 2023-01-06 DIAGNOSIS — E11.9 TYPE 2 DIABETES MELLITUS WITHOUT COMPLICATION, WITHOUT LONG-TERM CURRENT USE OF INSULIN (HCC): ICD-10-CM

## 2023-01-06 RX ORDER — PRAVASTATIN SODIUM 10 MG
TABLET ORAL
Qty: 90 TABLET | Refills: 1 | Status: SHIPPED | OUTPATIENT
Start: 2023-01-06

## 2023-01-13 ENCOUNTER — TELEPHONE (OUTPATIENT)
Dept: FAMILY MEDICINE CLINIC | Facility: MEDICAL CENTER | Age: 63
End: 2023-01-13

## 2023-01-13 NOTE — TELEPHONE ENCOUNTER
Pt is having a colonoscopy on 1/24/23  They want her to take her metformin the night before at dinnertime, then not in the morning  Pt wants to make sure that that's OK      Routed to [facility], [facility]

## 2023-01-17 ENCOUNTER — TELEPHONE (OUTPATIENT)
Dept: GASTROENTEROLOGY | Facility: CLINIC | Age: 63
End: 2023-01-17

## 2023-01-17 NOTE — TELEPHONE ENCOUNTER
Spoke to pt confirming pt's colonoscopy/egd scheduled on 1/24/23 at [facility] with Dr Ade Monsivais   Informed [facility] would be calling the day prior with the arrival time  Pt has instructions and did not have any questions

## 2023-01-18 DIAGNOSIS — K21.9 GASTROESOPHAGEAL REFLUX DISEASE WITHOUT ESOPHAGITIS: ICD-10-CM

## 2023-01-18 RX ORDER — OMEPRAZOLE 20 MG/1
CAPSULE, DELAYED RELEASE ORAL
Qty: 180 CAPSULE | Refills: 1 | Status: SHIPPED | OUTPATIENT
Start: 2023-01-18

## 2023-01-19 ENCOUNTER — TELEPHONE (OUTPATIENT)
Dept: GASTROENTEROLOGY | Facility: CLINIC | Age: 63
End: 2023-01-19

## 2023-01-19 NOTE — TELEPHONE ENCOUNTER
Patient Education     A rib contusion is a bruise to one or more rib bones. It may cause pain, tenderness, swelling, and a purplish tint to the skin. There may be a sharp pain with each breath. A rib contusion takes anywhere from a few days to a few weeks to heal. A minor rib fracture or break may cause the same symptoms as a rib contusion. The small crack may not be seen on a regular chest X-ray. Treatment for both problems is the same.  Home care  · You may use over-the-counter pain medicine to control pain, unless another pain medicine was prescribed. If you have chronic liver or kidney disease or ever had a stomach ulcer or GI bleeding, talk with your healthcare provider before using these medicines.  · Rest. Do not lift anything heavy or do any activity that causes pain.  · Apply an ice pack over the injured area for 15 to 20 minutes every 1 to 2 hours. You should do this for the first 24 to 48 hours. You can make an ice pack by filling a plastic bag that seals at the top with ice cubes and then wrapping it with a thin towel. Continue with ice packs as needed for the relief of pain and swelling.  · The first 3 to 4 weeks of healing will be the most painful. If your pain is not under control with the treatment given, call your healthcare provider. Sometimes a stronger pain medicine may be needed. A nerve block can be done in case of severe pain. It will numb the nerve between the ribs.  Follow-up care  Follow up with your healthcare provider, or as advised.  If X-rays were taken, you will be told of any new findings that may affect your care.  Call 911  Call 911 if you have:  · Dizziness, weakness or fainting  · Shortness of breath with or without chest discomfort  · New or worsening pain  When to seek medical advice  Call your healthcare provider right away if any of these occur:  · Fever of 100.4°F (38°C) or higher, or as directed by your healthcare provider  · Stomach pain  Date Last Reviewed: 12/2/2015  ©  Pt called to r/s procedures on 1/24 with Dr Santiago Francois, Procedures have been rescheduled for 2/22  [id number] [employer]. [address] [place] [address], [place] [address]. All rights reserved. This information is not intended as a substitute for professional medical care. Always follow your healthcare professional's instructions.         You may be receiving a patient experience survey regarding your visit today.  We value your feedback and hope you can provide us your insight. Thank you.

## 2023-02-13 ENCOUNTER — TELEPHONE (OUTPATIENT)
Dept: GASTROENTEROLOGY | Facility: CLINIC | Age: 63
End: 2023-02-13

## 2023-02-13 NOTE — TELEPHONE ENCOUNTER
Spoke to ptconfirming pt's colonoscopy and EGD scheduled on 2/22/23 at [facility] with Dr Victoriano Barkley   Informed [facility] would be calling the day prior with the arrival time  Pt has instructions and did not have any questions

## 2023-02-15 ENCOUNTER — RA CDI HCC (OUTPATIENT)
Dept: OTHER | Facility: HOSPITAL | Age: 63
End: 2023-02-15

## 2023-02-15 NOTE — PROGRESS NOTES
[facility]  coding opportunities     E66 01     Chart Reviewed number of suggestions sent to Provider: 1     Patients Insurance     Medicare Insurance: [employer]

## 2023-02-21 ENCOUNTER — NURSE TRIAGE (OUTPATIENT)
Dept: OTHER | Facility: OTHER | Age: 63
End: 2023-02-21

## 2023-02-21 ENCOUNTER — OFFICE VISIT (OUTPATIENT)
Dept: FAMILY MEDICINE CLINIC | Facility: MEDICAL CENTER | Age: 63
End: 2023-02-21

## 2023-02-21 VITALS
WEIGHT: 293 LBS | HEIGHT: 68 IN | BODY MASS INDEX: 44.41 KG/M2 | DIASTOLIC BLOOD PRESSURE: 88 MMHG | OXYGEN SATURATION: 96 % | SYSTOLIC BLOOD PRESSURE: 130 MMHG | HEART RATE: 100 BPM

## 2023-02-21 DIAGNOSIS — E66.01 MORBID OBESITY (HCC): ICD-10-CM

## 2023-02-21 DIAGNOSIS — M79.89 PAIN AND SWELLING OF LOWER LEG, UNSPECIFIED LATERALITY: ICD-10-CM

## 2023-02-21 DIAGNOSIS — M79.669 PAIN AND SWELLING OF LOWER LEG, UNSPECIFIED LATERALITY: ICD-10-CM

## 2023-02-21 DIAGNOSIS — I10 PRIMARY HYPERTENSION: ICD-10-CM

## 2023-02-21 DIAGNOSIS — E11.9 TYPE 2 DIABETES MELLITUS WITHOUT COMPLICATION, WITHOUT LONG-TERM CURRENT USE OF INSULIN (HCC): Primary | ICD-10-CM

## 2023-02-21 LAB — SL AMB POCT HEMOGLOBIN AIC: 6.4 (ref ?–6.5)

## 2023-02-21 RX ORDER — LIDOCAINE HYDROCHLORIDE 10 MG/ML
0.5 INJECTION, SOLUTION EPIDURAL; INFILTRATION; INTRACAUDAL; PERINEURAL ONCE AS NEEDED
Status: CANCELLED | OUTPATIENT
Start: 2023-02-21

## 2023-02-21 RX ORDER — SODIUM CHLORIDE, SODIUM LACTATE, POTASSIUM CHLORIDE, CALCIUM CHLORIDE 600; 310; 30; 20 MG/100ML; MG/100ML; MG/100ML; MG/100ML
125 INJECTION, SOLUTION INTRAVENOUS CONTINUOUS
Status: CANCELLED | OUTPATIENT
Start: 2023-02-21

## 2023-02-21 NOTE — PROGRESS NOTES
Assessment/Plan:    Colonoscopy scheduled for tomorrow  Mammogram declined  COVID-and pneumococcal vaccines declined  Discussed cervical cancer screenings with patient, declined  Fasting blood work ordered, to be done in 3 months prior to next office visit  Encouraged patient to start exercising/walking at least 3 days/week  Discussed treatment for leg pain and swelling  Compression stocking order placed  Follow-up in 3 months or sooner if needed  1  Type 2 diabetes mellitus without complication, without long-term current use of insulin (HCC)  Stable  Continue metformin  Recheck labs and urine test as ordered in 3 months  Encouraged to follow healthy diet and start exercising at least 3 days/week  Component      Latest Ref Rng & Units 2/21/2023   Hemoglobin A1C      6 5 6 4   - Urine Microalbumin/creatinine ratio; Future  - POCT hemoglobin A1c  - Lipid panel; Future  - Hemoglobin A1C; Future    2  Primary hypertension  Blood pressure stable  Continue amlodipine and benazepril  Check labs to monitor kidney function  - Comprehensive metabolic panel; Future  - Lipid panel; Future    3  Morbid obesity ([address] )  Encouraged patient to follow healthy diet, start exercising at least 3 days/week  May start with walking   - Compression Stocking    4  Pain and swelling of lower leg, unspecified laterality  Encouraged patient to follow healthy diet, exercise and lose weight  Advised patient to elevate legs  May try Tylenol arthritis for pain relief  Use compression stockings as discussed  If no improvement, will consider PT    - Compression Stocking      Subjective:      Patient ID: Noemi Navarro is a 63 y o  female  74-year-old female presents for 6-month follow-up  She has diabetes mellitus type 2  Currently on metformin and pravastatin for cardioprotection  She has hypertension  Currently on amlodipine and benazepril  Tolerating medications well    She is scheduled for colonoscopy and endoscopy by GI for tomorrow  She is complaining of bilateral leg pain and swelling x 3 weeks  Reports pain is entire leg from thigh down to ankles, occurs after being on feet all day  She reports pain and swelling improves with elevation of legs and mineral ice otc gel  She does have a history of osteoarthritis  Denies calf pain, redness, swelling, hot to the touch, fever, chills  The following portions of the patient's history were reviewed and updated as appropriate: allergies, current medications, past family history, past medical history, past surgical history and problem list     Review of Systems   Constitutional: Negative  HENT: Negative  Eyes: Negative  Respiratory: Negative  Cardiovascular: Negative  Gastrointestinal: Negative  Endocrine: Negative  Genitourinary: Negative  Musculoskeletal:        Bilateral leg pain and swelling     Skin: Negative  Allergic/Immunologic: Negative  Neurological: Negative  Hematological: Negative  Psychiatric/Behavioral: Negative  Objective:      /88 (BP Location: Left arm, Patient Position: Sitting, Cuff Size: Large)   Pulse 100   Ht 5' 8" (1 727 m)   Wt (!) 139 kg (305 lb 9 6 oz)   SpO2 96%   BMI 46 47 kg/m²          Physical Exam  Vitals and nursing note reviewed  Constitutional:       General: She is not in acute distress  Appearance: Normal appearance  She is obese  She is not ill-appearing  HENT:      Head: Normocephalic and atraumatic  Eyes:      General:         Right eye: No discharge  Left eye: No discharge  Conjunctiva/sclera: Conjunctivae normal       Pupils: Pupils are equal, round, and reactive to light  Cardiovascular:      Rate and Rhythm: Normal rate and regular rhythm  Pulses: Normal pulses  Heart sounds: Normal heart sounds  Pulmonary:      Effort: Pulmonary effort is normal  No respiratory distress  Breath sounds: Normal breath sounds  No stridor   No wheezing, rhonchi or rales  Musculoskeletal:         General: No tenderness  Normal range of motion  Cervical back: Normal range of motion and neck supple  Right lower leg: Normal  No swelling or tenderness  No edema  Left lower leg: Normal  No swelling or tenderness  No edema  Skin:     General: Skin is warm and dry  Findings: No erythema  Neurological:      General: No focal deficit present  Mental Status: She is alert and oriented to person, place, and time  Mental status is at baseline  Psychiatric:         Mood and Affect: Mood normal          Behavior: Behavior normal          Thought Content: Thought content normal            BMI Counseling: Body mass index is 46 47 kg/m²  The BMI is above normal  Nutrition recommendations include encouraging healthy choices of fruits and vegetables, moderation in carbohydrate intake and increasing intake of lean protein  Exercise recommendations include moderate physical activity 150 minutes/week and exercising 3-5 times per week  No pharmacotherapy was ordered  Rationale for BMI follow-up plan is due to patient being overweight or obese  Depression Screening and Follow-up Plan: Patient was screened for depression during today's encounter  They screened negative with a PHQ-2 score of 0              RAHEL Prakash

## 2023-02-21 NOTE — TELEPHONE ENCOUNTER
Regarding: Prep instructions  ----- Message from Jeffry Medina sent at 2/21/2023  9:58 AM EST -----  "I have some questions about the prep instructions "

## 2023-02-21 NOTE — TELEPHONE ENCOUNTER
Patient advised to take metformin at dinnertime  Patient would like to know how to take medication with dinner if she is remain on clears  Advised broth is an option for dinnertime  Requesting call back to confirm   Colonoscopy tomorrow  Reason for Disposition  • Nursing judgment    Protocols used: INFORMATION ONLY CALL - NO TRIAGE-ADULT-OH

## 2023-02-22 ENCOUNTER — ANESTHESIA (OUTPATIENT)
Dept: GASTROENTEROLOGY | Facility: HOSPITAL | Age: 63
End: 2023-02-22

## 2023-02-22 ENCOUNTER — HOSPITAL ENCOUNTER (OUTPATIENT)
Dept: GASTROENTEROLOGY | Facility: HOSPITAL | Age: 63
Setting detail: OUTPATIENT SURGERY
Discharge: HOME | End: 2023-02-22
Attending: INTERNAL MEDICINE

## 2023-02-22 ENCOUNTER — ANESTHESIA EVENT (OUTPATIENT)
Dept: GASTROENTEROLOGY | Facility: HOSPITAL | Age: 63
End: 2023-02-22

## 2023-02-22 VITALS
TEMPERATURE: 98.6 F | BODY MASS INDEX: 44.41 KG/M2 | RESPIRATION RATE: 18 BRPM | OXYGEN SATURATION: 97 % | SYSTOLIC BLOOD PRESSURE: 100 MMHG | WEIGHT: 293 LBS | DIASTOLIC BLOOD PRESSURE: 56 MMHG | HEIGHT: 68 IN | HEART RATE: 106 BPM

## 2023-02-22 DIAGNOSIS — R14.0 BLOATING: ICD-10-CM

## 2023-02-22 DIAGNOSIS — K21.9 GASTROESOPHAGEAL REFLUX DISEASE, UNSPECIFIED WHETHER ESOPHAGITIS PRESENT: ICD-10-CM

## 2023-02-22 DIAGNOSIS — Z86.010 HISTORY OF COLON POLYPS: ICD-10-CM

## 2023-02-22 LAB — GLUCOSE SERPL-MCNC: 139 MG/DL (ref 65–140)

## 2023-02-22 RX ORDER — LIDOCAINE HYDROCHLORIDE 20 MG/ML
INJECTION, SOLUTION EPIDURAL; INFILTRATION; INTRACAUDAL; PERINEURAL AS NEEDED
Status: DISCONTINUED | OUTPATIENT
Start: 2023-02-22 | End: 2023-02-22

## 2023-02-22 RX ORDER — SODIUM CHLORIDE, SODIUM LACTATE, POTASSIUM CHLORIDE, CALCIUM CHLORIDE 600; 310; 30; 20 MG/100ML; MG/100ML; MG/100ML; MG/100ML
125 INJECTION, SOLUTION INTRAVENOUS CONTINUOUS
Status: DISCONTINUED | OUTPATIENT
Start: 2023-02-22 | End: 2023-02-26 | Stop reason: HOSPADM

## 2023-02-22 RX ORDER — PROPOFOL 10 MG/ML
INJECTION, EMULSION INTRAVENOUS AS NEEDED
Status: DISCONTINUED | OUTPATIENT
Start: 2023-02-22 | End: 2023-02-22

## 2023-02-22 RX ORDER — LIDOCAINE HYDROCHLORIDE 10 MG/ML
0.5 INJECTION, SOLUTION EPIDURAL; INFILTRATION; INTRACAUDAL; PERINEURAL ONCE AS NEEDED
Status: DISCONTINUED | OUTPATIENT
Start: 2023-02-22 | End: 2023-02-26 | Stop reason: HOSPADM

## 2023-02-22 RX ORDER — DEXMEDETOMIDINE HYDROCHLORIDE 100 UG/ML
INJECTION, SOLUTION INTRAVENOUS AS NEEDED
Status: DISCONTINUED | OUTPATIENT
Start: 2023-02-22 | End: 2023-02-22

## 2023-02-22 RX ORDER — GLYCOPYRROLATE 0.2 MG/ML
INJECTION INTRAMUSCULAR; INTRAVENOUS AS NEEDED
Status: DISCONTINUED | OUTPATIENT
Start: 2023-02-22 | End: 2023-02-22

## 2023-02-22 RX ADMIN — DEXMEDETOMIDINE HCL 12 MCG: 100 INJECTION INTRAVENOUS at 09:10

## 2023-02-22 RX ADMIN — DEXMEDETOMIDINE HCL 4 MCG: 100 INJECTION INTRAVENOUS at 09:13

## 2023-02-22 RX ADMIN — GLYCOPYRROLATE 0.1 MCG: 0.2 INJECTION INTRAMUSCULAR; INTRAVENOUS at 09:05

## 2023-02-22 RX ADMIN — Medication 50 MG: at 09:07

## 2023-02-22 RX ADMIN — DEXMEDETOMIDINE HCL 20 MCG: 100 INJECTION INTRAVENOUS at 09:06

## 2023-02-22 RX ADMIN — PROPOFOL 50 MG: 10 INJECTION, EMULSION INTRAVENOUS at 09:21

## 2023-02-22 RX ADMIN — SODIUM CHLORIDE, SODIUM LACTATE, POTASSIUM CHLORIDE, AND CALCIUM CHLORIDE: .6; .31; .03; .02 INJECTION, SOLUTION INTRAVENOUS at 09:03

## 2023-02-22 RX ADMIN — DEXMEDETOMIDINE HCL 4 MCG: 100 INJECTION INTRAVENOUS at 09:15

## 2023-02-22 RX ADMIN — PROPOFOL 50 MG: 10 INJECTION, EMULSION INTRAVENOUS at 09:27

## 2023-02-22 RX ADMIN — LIDOCAINE HYDROCHLORIDE 100 MG: 20 INJECTION, SOLUTION EPIDURAL; INFILTRATION; INTRACAUDAL; PERINEURAL at 09:05

## 2023-02-22 RX ADMIN — PROPOFOL 100 MG: 10 INJECTION, EMULSION INTRAVENOUS at 09:07

## 2023-02-22 RX ADMIN — PROPOFOL 50 MG: 10 INJECTION, EMULSION INTRAVENOUS at 09:17

## 2023-02-22 RX ADMIN — GLYCOPYRROLATE 0.2 MCG: 0.2 INJECTION INTRAMUSCULAR; INTRAVENOUS at 09:25

## 2023-02-22 RX ADMIN — TOPICAL ANESTHETIC 2 SPRAY: 200 SPRAY DENTAL; PERIODONTAL at 09:05

## 2023-02-22 NOTE — H&P
History and Physical - [facility]  Bereket Melchor 63 y o  female MRN: [id number]                  HPI: Bereket Melchor is a 63y o  year old female who presents for polyp GERD      REVIEW OF SYSTEMS: Per the HPI, and otherwise unremarkable      Historical Information   Past Medical History:   Diagnosis Date   • Colon polyp    • Diabetes mellitus ([address] )    • Fatty liver    • GERD (gastroesophageal reflux disease)    • Hypertension      Past Surgical History:   Procedure Laterality Date   • BACK SURGERY     • COLONOSCOPY     • FINGER SURGERY     • SINUS SURGERY     • TUBAL LIGATION     • UPPER GASTROINTESTINAL ENDOSCOPY       Social History   Social History     Substance and Sexual Activity   Alcohol Use Never     Social History     Substance and Sexual Activity   Drug Use Never     Social History     Tobacco Use   Smoking Status Never   Smokeless Tobacco Never     Family History   Problem Relation Age of Onset   • Heart disease Mother    • Atrial fibrillation Mother    • Thyroid disease Mother    • Coronary artery disease Father    • Diabetes Father        Meds/Allergies       Current Outpatient Medications:   •  allopurinol (ZYLOPRIM) 100 mg tablet  •  amLODIPine (NORVASC) 10 mg tablet  •  benazepril (LOTENSIN) 40 MG tablet  •  desloratadine (CLARINEX) 5 MG tablet  •  dicyclomine (BENTYL) 10 mg capsule  •  fluticasone (FLONASE) 50 mcg/act nasal spray  •  ibuprofen (MOTRIN) 800 mg tablet  •  metFORMIN (GLUCOPHAGE) 500 mg tablet  •  montelukast (SINGULAIR) 10 mg tablet  •  omeprazole (PriLOSEC) 20 mg delayed release capsule  •  ondansetron (Zofran ODT) 4 mg disintegrating tablet  •  pravastatin (PRAVACHOL) 10 mg tablet  •  polyethylene glycol (Golytely) 4000 mL solution    Current Facility-Administered Medications:   •  lactated ringers infusion, 125 mL/hr, Intravenous, Continuous  •  lidocaine (PF) (XYLOCAINE-MPF) 1 % injection 0 5 mL, 0 5 mL, Infiltration, Once PRN    Allergies   Allergen Reactions • Penicillins        Objective     /60   Pulse 99   Temp (!) 97 1 °F (36 2 °C) (Temporal)   Resp 18   Ht 5' 8" (1 727 m)   Wt (!) 136 kg (300 lb 11 2 oz)   SpO2 97%   BMI 45 72 kg/m²       PHYSICAL EXAM    Gen: NAD  Head: NCAT  CV: RRR  CHEST: Clear  ABD: soft, NT/ND  EXT: no edema      ASSESSMENT/PLAN:  This is a 63y o  year old female here for EGD colon , and she is stable and optimized for her procedure

## 2023-02-22 NOTE — ANESTHESIA PREPROCEDURE EVALUATION
Procedure:  COLONOSCOPY  EGD    Relevant Problems   CARDIO   (+) HTN (hypertension)      GI/HEPATIC   (+) GERD (gastroesophageal reflux disease)      MUSCULOSKELETAL   (+) Cervical spondylosis   (+) Gout        Physical Exam    Airway    Mallampati score: I  TM Distance: >3 FB  Neck ROM: full     Dental   No notable dental hx     Cardiovascular  Rhythm: regular, Rate: normal, Cardiovascular exam normal    Pulmonary  Pulmonary exam normal Breath sounds clear to auscultation,     Other Findings  Very large tonsils  MP1 but narrowed posterior oral pharynx      Anesthesia Plan  ASA Score- 3     Anesthesia Type- IV sedation with anesthesia with ASA Monitors  Additional Monitors:   Airway Plan:     Comment: Discussed risks/benefits, including medication reactions, awareness, aspiration, and serious/life threatening complications  Plan to maintain native airway with IVGA, monitored with EtCO2  Plan Factors-Exercise tolerance (METS): >4 METS  Patient summary reviewed  Patient instructed to abstain from smoking on day of procedure  Patient did not smoke on day of surgery  Induction- intravenous  Postoperative Plan-     Informed Consent- Anesthetic plan and risks discussed with patient  I personally reviewed this patient with the CRNA  Discussed and agreed on the Anesthesia Plan with the CRNA Gerhardt Sep

## 2023-02-22 NOTE — ANESTHESIA POSTPROCEDURE EVALUATION
Post-Op Assessment Note    CV Status:  Stable  Pain Score: 0    Pain management: adequate     Mental Status:  Awake   Hydration Status:  Stable   PONV Controlled:  Controlled   Airway Patency:  Patent      Post Op Vitals Reviewed: Yes      Staff: CRNA         No notable events documented      BP   114/67   Temp     Pulse  74   Resp   12   SpO2   98

## 2023-03-17 ENCOUNTER — TELEPHONE (OUTPATIENT)
Dept: FAMILY MEDICINE CLINIC | Facility: MEDICAL CENTER | Age: 63
End: 2023-03-17

## 2023-03-17 DIAGNOSIS — Z20.5 EXPOSURE TO HEPATITIS C: Primary | ICD-10-CM

## 2023-03-17 NOTE — TELEPHONE ENCOUNTER
Pt said her ex has hepatitis but she has not been with him in 4 years  She had testing done in 2017  Please, advise if pt needs more b/w

## 2023-03-17 NOTE — TELEPHONE ENCOUNTER
Hep C screening labs from 5/2017 negative however since she was with him after the lab work, I would recommend repeating this  Order placed

## 2023-03-20 ENCOUNTER — APPOINTMENT (OUTPATIENT)
Dept: LAB | Facility: MEDICAL CENTER | Age: 63
End: 2023-03-20

## 2023-03-20 DIAGNOSIS — Z20.5 EXPOSURE TO HEPATITIS C: ICD-10-CM

## 2023-03-20 LAB — HCV AB SER QL: NORMAL

## 2023-03-22 ENCOUNTER — TELEPHONE (OUTPATIENT)
Dept: FAMILY MEDICINE CLINIC | Facility: MEDICAL CENTER | Age: 63
End: 2023-03-22

## 2023-03-22 NOTE — TELEPHONE ENCOUNTER
Pt called backs he remembered what Deysi Gomez told her, and said the dx had to be related to edema  [facility] phone # [phone]

## 2023-03-22 NOTE — TELEPHONE ENCOUNTER
Can we please contact [facility] and ask what diagnosis they need for coverage and what other information is needed for the compression stockings to be covered? Is there a form that we need to fill out or just send new script with new information on it? Need clarification to change order

## 2023-03-22 NOTE — TELEPHONE ENCOUNTER
Diagnosis more specific like what? Is there a list of diagnosis listed for coverage? Knee high is fine

## 2023-03-22 NOTE — TELEPHONE ENCOUNTER
Pt went to get the compression stockings from [facility], and they told her the dx needs to be more specific for insurance to cover it, and they need to know the strength, and if it is knee high, or full leg  [facility] fax # [phone]  We just need to let the pt know the status

## 2023-03-22 NOTE — TELEPHONE ENCOUNTER
Pt aware knee high is fine and is going to call [facility] to see if there is a more specific dx related to her current dx's  Pt will call back

## 2023-03-23 DIAGNOSIS — R60.0 BILATERAL LOWER EXTREMITY EDEMA: Primary | ICD-10-CM

## 2023-03-23 NOTE — TELEPHONE ENCOUNTER
Would it be Ok to use [facility] , addend the order and fax back  See if that will be sufficient? A form is not needed , the order is all they need

## 2023-03-23 NOTE — TELEPHONE ENCOUNTER
New order placed with diagnosis of lower extremity edema, I also chose knee high and specified compression  I sent this directly to [facility]

## 2023-04-01 DIAGNOSIS — J30.89 ENVIRONMENTAL AND SEASONAL ALLERGIES: ICD-10-CM

## 2023-04-03 RX ORDER — FLUTICASONE PROPIONATE 50 MCG
SPRAY, SUSPENSION (ML) NASAL
Qty: 16 G | Refills: 3 | Status: SHIPPED | OUTPATIENT
Start: 2023-04-03

## 2023-04-30 ENCOUNTER — HOSPITAL ENCOUNTER (OUTPATIENT)
Dept: MRI IMAGING | Facility: HOSPITAL | Age: 63
Discharge: HOME | End: 2023-04-30
Attending: PODIATRIST

## 2023-04-30 DIAGNOSIS — M25.472 PAIN AND SWELLING OF LEFT ANKLE: ICD-10-CM

## 2023-04-30 DIAGNOSIS — M25.572 PAIN AND SWELLING OF LEFT ANKLE: ICD-10-CM

## 2023-04-30 RX ADMIN — GADOBUTROL 13 ML: 604.72 INJECTION INTRAVENOUS at 17:27

## 2023-05-03 DIAGNOSIS — J30.89 ENVIRONMENTAL AND SEASONAL ALLERGIES: ICD-10-CM

## 2023-05-03 RX ORDER — MONTELUKAST SODIUM 10 MG/1
TABLET ORAL
Qty: 90 TABLET | Refills: 3 | Status: SHIPPED | OUTPATIENT
Start: 2023-05-03

## 2023-05-15 ENCOUNTER — TELEPHONE (OUTPATIENT)
Dept: FAMILY MEDICINE CLINIC | Facility: MEDICAL CENTER | Age: 63
End: 2023-05-15
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
## 2023-05-16 DIAGNOSIS — S40.869A TICK BITE OF UPPER ARM, UNSPECIFIED LATERALITY, INITIAL ENCOUNTER: Primary | ICD-10-CM
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
## 2023-05-16 NOTE — TELEPHONE ENCOUNTER
It may take several weeks after exposure for lymes testing to come up positive  When was her exposure?

## 2023-05-18 ENCOUNTER — APPOINTMENT (OUTPATIENT)
Dept: LAB | Facility: MEDICAL CENTER | Age: 63
End: 2023-05-18

## 2023-05-18 DIAGNOSIS — I10 PRIMARY HYPERTENSION: ICD-10-CM
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
## 2023-05-18 DIAGNOSIS — E11.9 TYPE 2 DIABETES MELLITUS WITHOUT COMPLICATION, WITHOUT LONG-TERM CURRENT USE OF INSULIN (HCC): ICD-10-CM

## 2023-05-18 LAB
ALBUMIN SERPL BCP-MCNC: 3.7 G/DL (ref 3.5–5)
ALP SERPL-CCNC: 123 U/L (ref 46–116)
ALT SERPL W P-5'-P-CCNC: 37 U/L (ref 12–78)
ANION GAP SERPL CALCULATED.3IONS-SCNC: 4 MMOL/L (ref 4–13)
AST SERPL W P-5'-P-CCNC: 41 U/L (ref 5–45)
B BURGDOR IGG+IGM SER-ACNC: <0.2 AI
BILIRUB SERPL-MCNC: 0.37 MG/DL (ref 0.2–1)
BUN SERPL-MCNC: 16 MG/DL (ref 5–25)
CALCIUM SERPL-MCNC: 9.2 MG/DL (ref 8.3–10.1)
CHLORIDE SERPL-SCNC: 107 MMOL/L (ref 96–108)
CHOLEST SERPL-MCNC: 129 MG/DL
CO2 SERPL-SCNC: 26 MMOL/L (ref 21–32)
CREAT SERPL-MCNC: 0.77 MG/DL (ref 0.6–1.3)
CREAT UR-MCNC: 120 MG/DL
GFR SERPL CREATININE-BSD FRML MDRD: 84 ML/MIN/1.73SQ M
GLUCOSE P FAST SERPL-MCNC: 132 MG/DL (ref 65–99)
HDLC SERPL-MCNC: 62 MG/DL
LDLC SERPL CALC-MCNC: 43 MG/DL (ref 0–100)
MICROALBUMIN UR-MCNC: 12.7 MG/L (ref 0–20)
MICROALBUMIN/CREAT 24H UR: 11 MG/G CREATININE (ref 0–30)
NONHDLC SERPL-MCNC: 67 MG/DL
POTASSIUM SERPL-SCNC: 4.4 MMOL/L (ref 3.5–5.3)
PROT SERPL-MCNC: 8.3 G/DL (ref 6.4–8.4)
SODIUM SERPL-SCNC: 137 MMOL/L (ref 135–147)
TRIGL SERPL-MCNC: 118 MG/DL

## 2023-05-19 LAB
EST. AVERAGE GLUCOSE BLD GHB EST-MCNC: 137 MG/DL
HBA1C MFR BLD: 6.4 %

## 2023-05-25 ENCOUNTER — OFFICE VISIT (OUTPATIENT)
Dept: FAMILY MEDICINE CLINIC | Facility: MEDICAL CENTER | Age: 63
End: 2023-05-25

## 2023-05-25 VITALS
DIASTOLIC BLOOD PRESSURE: 84 MMHG | TEMPERATURE: 98.6 F | RESPIRATION RATE: 18 BRPM | HEART RATE: 130 BPM | HEIGHT: 68 IN | BODY MASS INDEX: 44.41 KG/M2 | WEIGHT: 293 LBS | SYSTOLIC BLOOD PRESSURE: 136 MMHG | OXYGEN SATURATION: 98 %

## 2023-05-25 DIAGNOSIS — E11.9 TYPE 2 DIABETES MELLITUS WITHOUT COMPLICATION, WITHOUT LONG-TERM CURRENT USE OF INSULIN (HCC): Primary | ICD-10-CM

## 2023-05-25 DIAGNOSIS — R07.89 CHEST HEAVINESS: ICD-10-CM

## 2023-05-25 DIAGNOSIS — R00.0 TACHYCARDIA: ICD-10-CM

## 2023-05-25 DIAGNOSIS — I10 PRIMARY HYPERTENSION: ICD-10-CM

## 2023-05-25 DIAGNOSIS — R06.02 SOB (SHORTNESS OF BREATH) ON EXERTION: ICD-10-CM

## 2023-05-25 PROBLEM — R09.89 UPPER RESPIRATORY SYMPTOM: Status: RESOLVED | Noted: 2022-02-11 | Resolved: 2023-05-25

## 2023-05-25 RX ORDER — ALLOPURINOL 300 MG/1
300 TABLET ORAL DAILY
COMMUNITY
Start: 2023-03-17

## 2023-05-25 NOTE — PROGRESS NOTES
Assessment/Plan:    Continue medications at current dose, refills not needed  POC ECG performed in office  Holter monitor and echocardiogram ordered, advised patient to schedule as soon as possible  Patient advised to call the office if symptoms of shortness of breath and tachycardia worsen  Follow-up in 3 months for annual Medicare wellness visit, sooner if needed  1  Type 2 diabetes mellitus without complication, without long-term current use of insulin (HCC)  Stable on most recent labs 6 4%  Continue metformin at current dose  2  Primary hypertension  Blood pressure stable  Continue amlodipine and benazepril  3  Tachycardia  Tachycardic in office at 116 initially, 130 upon manual recheck and 111 on in office ECG  Point-of-care ECG performed in office, sinus tachycardia with a heart rate of 111 bpm   Advised patient to call and schedule Holter monitor  Advised patient if symptoms worsen she needs to either call the office or be evaluated urgently in the emergency department  Wells score 1 5, low risk    - POCT ECG  - Holter monitor; Future    4  SOB (shortness of breath) on exertion  Advised patient to call and schedule echocardiogram   Advised patient if symptoms worsen she needs to either call the office or be evaluated urgently in the emergency department  - Echo complete w/ contrast if indicated; Future    5  Chest heaviness  POC ECG shows sinus tachycardia at 111 bpm   Advised patient if symptoms worsen she needs to either call the office or be evaluated urgently in the emergency department   - POCT ECG      Subjective:      Patient ID: Liilya Isabel is a 63 y o  female  29-year-old female presents for 3-month follow-up  She has diabetes mellitus type 2  Currently on metformin  She has hypertension  Currently on benazepril and amlodipine  Tolerating all medications well  She does not need refills today  She is obese    Diet lifestyle modifications discussed with patient at last "office visit  She is complaining of heart racing, exertional sob and chest heaviness x 2 weeks  She reports sob is only with exertion however chest heaviness and tachycardia are present constantly  Denies leg swelling  Denies recent long travel, immobilization  Denies history of PE/DVT  She is a non-smoker  She tells me she is up moving and active for the most part as she helps care for her mother  Patient admits to being under a lot of stress and has a lot going on lately and has attributed her above-mentioned symptoms to this  The following portions of the patient's history were reviewed and updated as appropriate: allergies, current medications, past family history, past medical history, past surgical history and problem list     Review of Systems      Objective:      /84 (BP Location: Left arm, Patient Position: Sitting, Cuff Size: Large)   Pulse (!) 130   Temp 98 6 °F (37 °C)   Resp 18   Ht 5' 8\" (1 727 m)   Wt (!) 137 kg (302 lb)   SpO2 98%   BMI 45 92 kg/m²          Physical Exam  Vitals and nursing note reviewed  Constitutional:       General: She is not in acute distress  Appearance: Normal appearance  She is obese  She is not ill-appearing  HENT:      Head: Normocephalic and atraumatic  Cardiovascular:      Rate and Rhythm: Regular rhythm  Tachycardia present  Pulses: Normal pulses  no weak pulses     Heart sounds: Normal heart sounds  Pulmonary:      Effort: Pulmonary effort is normal  No respiratory distress  Breath sounds: Normal breath sounds  No stridor  No wheezing, rhonchi or rales  Musculoskeletal:         General: Normal range of motion  Cervical back: Normal range of motion and neck supple  Right lower leg: No edema  Left lower leg: No edema  Feet:      Right foot:      Skin integrity: Callus present  No ulcer, skin breakdown, erythema, warmth or dry skin  Left foot:      Skin integrity: Callus present   No ulcer, skin " breakdown, erythema, warmth or dry skin  Skin:     General: Skin is warm and dry  Neurological:      General: No focal deficit present  Mental Status: She is alert and oriented to person, place, and time  Mental status is at baseline  Psychiatric:         Attention and Perception: Attention normal          Mood and Affect: Affect normal  Mood is anxious  Speech: Speech normal          Behavior: Behavior normal  Behavior is cooperative  Thought Content: Thought content normal                     Ignacio Vargheseabetic Foot Exam    Patient's shoes and socks removed  Right Foot/Ankle   Right Foot Inspection  Skin Exam: skin normal, skin intact, callus and callus  No dry skin, no warmth, no erythema, no maceration, no abnormal color, no pre-ulcer and no ulcer  Toe Exam: ROM and strength within normal limits  Sensory   Monofilament testing: intact    Left Foot/Ankle  Left Foot Inspection  Skin Exam: skin normal, skin intact and callus  No dry skin, no warmth, no erythema, no maceration, normal color, no pre-ulcer and no ulcer  Toe Exam: ROM and strength within normal limits       Sensory   Monofilament testing: diminished    Assign Risk Category  No deformity present  Loss of protective sensation  No weak pulses  Risk: 1

## 2023-05-25 NOTE — PATIENT INSTRUCTIONS
Call and schedule your echocardiogram and Holter monitor  Follow up with me in 3 months for annual Medicare wellness visit, sooner if needed

## 2023-05-26 ENCOUNTER — TELEPHONE (OUTPATIENT)
Dept: FAMILY MEDICINE CLINIC | Facility: MEDICAL CENTER | Age: 63
End: 2023-05-26

## 2023-05-26 NOTE — TELEPHONE ENCOUNTER
Pt called to make Veda Brumfield aware she has an appt on 5/31/23 @ 2:30 for her heart monitor and on 6/8/23 for the ultrasound      Routed to Veda Brumfield

## 2023-05-26 NOTE — TELEPHONE ENCOUNTER
Noted  She should call the office or proceed to ED for further evaluation if symptoms worsen as discussed in office yesterday

## 2023-05-31 ENCOUNTER — HOSPITAL ENCOUNTER (OUTPATIENT)
Dept: NON INVASIVE DIAGNOSTICS | Facility: CLINIC | Age: 63
Discharge: HOME | End: 2023-05-31

## 2023-05-31 DIAGNOSIS — R00.0 TACHYCARDIA: ICD-10-CM

## 2023-06-08 ENCOUNTER — TELEPHONE (OUTPATIENT)
Dept: FAMILY MEDICINE CLINIC | Facility: MEDICAL CENTER | Age: 63
End: 2023-06-08

## 2023-06-08 ENCOUNTER — HOSPITAL ENCOUNTER (OUTPATIENT)
Dept: NON INVASIVE DIAGNOSTICS | Facility: CLINIC | Age: 63
Discharge: HOME | End: 2023-06-08
Payer: COMMERCIAL

## 2023-06-08 VITALS
BODY MASS INDEX: 44.41 KG/M2 | SYSTOLIC BLOOD PRESSURE: 136 MMHG | HEART RATE: 90 BPM | HEIGHT: 68 IN | WEIGHT: 293 LBS | DIASTOLIC BLOOD PRESSURE: 84 MMHG

## 2023-06-08 DIAGNOSIS — R06.02 SOB (SHORTNESS OF BREATH) ON EXERTION: ICD-10-CM

## 2023-06-08 LAB
AORTIC ROOT: 3.1 CM
APICAL FOUR CHAMBER EJECTION FRACTION: 73 %
ASCENDING AORTA: 2.7 CM
E WAVE DECELERATION TIME: 206 MS
FRACTIONAL SHORTENING: 36 % (ref 28–44)
INTERVENTRICULAR SEPTUM IN DIASTOLE (PARASTERNAL SHORT AXIS VIEW): 1 CM
INTERVENTRICULAR SEPTUM: 1 CM (ref 0.6–1.1)
LAAS-AP2: 21.1 CM2
LAAS-AP4: 19.6 CM2
LEFT ATRIUM SIZE: 4 CM
LEFT INTERNAL DIMENSION IN SYSTOLE: 2.7 CM (ref 2.1–4)
LEFT VENTRICULAR INTERNAL DIMENSION IN DIASTOLE: 4.2 CM (ref 3.5–6)
LEFT VENTRICULAR POSTERIOR WALL IN END DIASTOLE: 0.9 CM
LEFT VENTRICULAR STROKE VOLUME: 54 ML
LVSV (TEICH): 54 ML
MV E'TISSUE VEL-SEP: 8 CM/S
MV PEAK A VEL: 0.8 M/S
MV PEAK E VEL: 64 CM/S
MV STENOSIS PRESSURE HALF TIME: 60 MS
MV VALVE AREA P 1/2 METHOD: 3.67 CM2
RIGHT ATRIUM AREA SYSTOLE A4C: 15 CM2
RIGHT VENTRICLE ID DIMENSION: 3.2 CM
SL CV LEFT ATRIUM LENGTH A2C: 6.3 CM
SL CV LV EF: 65
SL CV PED ECHO LEFT VENTRICLE DIASTOLIC VOLUME (MOD BIPLANE) 2D: 80 ML
SL CV PED ECHO LEFT VENTRICLE SYSTOLIC VOLUME (MOD BIPLANE) 2D: 26 ML
TRICUSPID ANNULAR PLANE SYSTOLIC EXCURSION: 1.9 CM

## 2023-06-08 PROCEDURE — 93306 TTE W/DOPPLER COMPLETE: CPT | Performed by: INTERNAL MEDICINE

## 2023-06-08 PROCEDURE — 93306 TTE W/DOPPLER COMPLETE: CPT

## 2023-06-08 NOTE — TELEPHONE ENCOUNTER
----- Message from [address] [place] [address] sent at 6/8/2023  2:47 PM EDT -----  Please inform patient Echo returned essentially normal   I do not think the cause of her sob with exertion is related to her heart based on these results  I am awaiting holter monitor results  How is she feeling? Symptoms still present? Improving? Clinical;   Can you please contact heart and vascular center for final read on holter that was done 5/31

## 2023-06-08 NOTE — TELEPHONE ENCOUNTER
Will await report from holter monitor  Is sob improving? If persistent sob and chest pressure, may need to recheck in office

## 2023-06-08 NOTE — TELEPHONE ENCOUNTER
"Pt aware  States she is \"ok\", still with pressure of the chest and it is unchanged  Called [facility]  Left message we are looking for the final report      "

## 2023-06-09 ENCOUNTER — TELEPHONE (OUTPATIENT)
Dept: CARDIOLOGY CLINIC | Facility: CLINIC | Age: 63
End: 2023-06-09

## 2023-06-09 DIAGNOSIS — R00.0 TACHYCARDIA: ICD-10-CM

## 2023-06-09 DIAGNOSIS — R07.89 CHEST HEAVINESS: Primary | ICD-10-CM

## 2023-06-09 DIAGNOSIS — R06.02 SOB (SHORTNESS OF BREATH) ON EXERTION: ICD-10-CM

## 2023-06-09 NOTE — TELEPHONE ENCOUNTER
Spoke with Simi Shepard MA   From [facility] and gave here result for patient over the phone

## 2023-06-09 NOTE — TELEPHONE ENCOUNTER
Good morning, we have a patient who hasn't been seen by our cardiology team       Patient was seen in [facility] 3 years ago   Patient hasn't followed with our cardiologist and  is having active chest pain   Patient is in [facility] PCP office now being seen   Patient had a Holter placed and [place] pcp wanted to know if patient Holter can be read by us because vascular team told them Dr Sigifredo Dong will read it for them   I explained to PCP office patient  has to be seen by our office to establish patient for result       Please advise number to call for [facility] lisette [phone]

## 2023-06-09 NOTE — TELEPHONE ENCOUNTER
Holter monitor results reviewed, normal with no acute findings  Echo also unremarkable overall  Since her symptoms persist and these tests are normal essentially, I would like her to follow up with Cardiology for further evaluation  Referral placed

## 2023-06-09 NOTE — TELEPHONE ENCOUNTER
"Jud called back from [address] [place] [address] Cardiology, she said that Dr Ania Riley resulted the Holter \"as a courtesy\" and no acute findings  I asked what the protocol should be in the future when we order Holters and she said she didn't know and she'd run it by her manager    "

## 2023-06-09 NOTE — TELEPHONE ENCOUNTER
"Received a call from Jackie Ngo  She told me that they \"did everything on their end\" and that it was now up to the cardiologist to read the holter and post the final results  She said to contact Dr Polo Willard at [phone]  I called  Cardiology at the number above and explained the situation  The  sounded confused and told me she'd transfer me to a nurse  The nurse then picked up and told me that the patient does not see anyone at their office  I explained again that she is not established with cardiology, that she went for an outpatient holter monitor that was placed at [facility]) [facility]  And that I contacted them and they told me to contact Dr Polo Willard to get the holter read  The nurse then proceeded to [facility] and said \"I dont know why they keep doing this\" and that [facility] keeps sending people to them  She said that the patient would need to set up an appointment with Cardiology and that they would read the Holter when they met with her for that appointment  I explained that the patient was seen here, we did an EKG, and the provider ordered the Holter  Patient is currently symptomatic with SOB and chest \"pressure\"  She then said \"oh well then send them to the ER\"  She said that she would put a message up to the \"team\" and see what can be done  I gave her our back line  I attempted to call back [facility] at [phone] but it goes right to their voicemail and the mailbox is full  So unable to even leave a message  DOLORES- will forward to ENRIQUE ANDERS [facility]    "

## 2023-06-15 DIAGNOSIS — K21.9 GASTROESOPHAGEAL REFLUX DISEASE WITHOUT ESOPHAGITIS: ICD-10-CM

## 2023-06-15 DIAGNOSIS — E11.9 TYPE 2 DIABETES MELLITUS WITHOUT COMPLICATION, WITHOUT LONG-TERM CURRENT USE OF INSULIN (HCC): ICD-10-CM

## 2023-06-15 RX ORDER — OMEPRAZOLE 20 MG/1
CAPSULE, DELAYED RELEASE ORAL
Qty: 180 CAPSULE | Refills: 1 | Status: SHIPPED | OUTPATIENT
Start: 2023-06-15

## 2023-07-06 ENCOUNTER — TELEPHONE (OUTPATIENT)
Dept: FAMILY MEDICINE CLINIC | Facility: MEDICAL CENTER | Age: 63
End: 2023-07-06

## 2023-07-06 DIAGNOSIS — R60.0 BILATERAL LOWER EXTREMITY EDEMA: Primary | ICD-10-CM

## 2023-07-06 DIAGNOSIS — I10 ESSENTIAL HYPERTENSION: ICD-10-CM

## 2023-07-06 RX ORDER — AMLODIPINE BESYLATE 10 MG/1
TABLET ORAL
Qty: 90 TABLET | Refills: 1 | Status: SHIPPED | OUTPATIENT
Start: 2023-07-06

## 2023-07-06 NOTE — TELEPHONE ENCOUNTER
Please sign the order for stocking .  [employer] is requiring a signature , when faxed back, please include cover sheet attached

## 2023-07-07 NOTE — TELEPHONE ENCOUNTER
Revised Rx faxed to number indicated on cover sheet.   Sent to [facility] to be attached to pt's chart

## 2023-10-02 DIAGNOSIS — I10 ESSENTIAL HYPERTENSION: ICD-10-CM

## 2023-10-02 RX ORDER — BENAZEPRIL HYDROCHLORIDE 40 MG/1
TABLET, FILM COATED ORAL
Qty: 90 TABLET | Refills: 1 | Status: SHIPPED | OUTPATIENT
Start: 2023-10-02

## 2023-10-03 ENCOUNTER — OFFICE VISIT (OUTPATIENT)
Dept: CARDIOLOGY CLINIC | Facility: MEDICAL CENTER | Age: 63
End: 2023-10-03
Payer: COMMERCIAL

## 2023-10-03 VITALS
HEART RATE: 98 BPM | DIASTOLIC BLOOD PRESSURE: 80 MMHG | HEIGHT: 68 IN | BODY MASS INDEX: 44.41 KG/M2 | WEIGHT: 293 LBS | OXYGEN SATURATION: 96 % | SYSTOLIC BLOOD PRESSURE: 126 MMHG

## 2023-10-03 DIAGNOSIS — R00.0 TACHYCARDIA: ICD-10-CM

## 2023-10-03 DIAGNOSIS — R06.02 SOB (SHORTNESS OF BREATH) ON EXERTION: ICD-10-CM

## 2023-10-03 DIAGNOSIS — R07.89 CHEST HEAVINESS: Primary | ICD-10-CM

## 2023-10-03 DIAGNOSIS — I10 PRIMARY HYPERTENSION: ICD-10-CM

## 2023-10-03 DIAGNOSIS — E66.01 MORBID OBESITY (HCC): ICD-10-CM

## 2023-10-03 PROCEDURE — 99204 OFFICE O/P NEW MOD 45 MIN: CPT | Performed by: INTERNAL MEDICINE

## 2023-10-03 NOTE — PROGRESS NOTES
[facility] [place]   [address] [facility] [address]                                            Cardiology Office Consult  Kevin Verma, 63 y.o. female  YOB: 1960  MRN: [id number] Encounter: [id number]      PCP - Juris Prader, CRNP  Referring Provider - Juris Prader, CRNP    Chief Complaint   Patient presents with   • New Patient Visit     Referred to by PCP after Echo       Assessment  Chest pressure  Shortness of breath  Hypertension  Diabetes mellitus type 2  Morbid obesity, Body mass index is 45.16 kg/m². Plan  Chest pressure, Shortness of breath  Overview   On & Off for past 6 months, worsened by stress  Previously had similar issues many years ago as well  ECG - 5/2023: Sinus tachycardia, low voltage QRS , poor R wave progression  Holter monitor 5/2023 showed sinus rhythm, 1 brief run of atrial tachycardia   TTE 6/23 showed LVEF 33%, grade 1 diastolic dysfunction, mildly dilated LA, no significant valvular abnormalities  Impression  Anxiety v obesity/weight gain v CAD  Plan  She does have risk factors for CAD including diabetes mellitus and hyperlipidemia  Recommend nuclear Lexiscan stress test   Counseled regarding dietary modifications and need for weight loss  Increase cardio activity/exercises with walking 10 minutes 2 times daily and then gradually increase to at least 30 minutes per day    Hypertension  Blood pressure well controlled, 126/80  Continue enalapril 40 mg daily, amlodipine 10 mg daily  Low-salt diet  Weight loss recommended    Hyperlipidemia, Morbid obesity - Body mass index is 45.16 kg/m². Well-controlled cholesterol levels  Continue pravastatin 10 mg daily  Emphasized need for weight loss with an eventual weight loss goal of around 90 pounds  But in the interim, I have at least suggested to try and lose 20 pounds over the next 3 to 4 months    No results found for this visit on 10/03/23.     No orders of the defined types were placed in this encounter. Return in about 3 months (around 1/3/2024), or if symptoms worsen or fail to improve. History of Present Illness   63 y.o. female comes in as a new patient for consultation regarding ongoing symptoms of chest tightness/pressure over the past 6 months. She reports on and off episodes without any definite triggers. She does get it with exertion, but also has the similar chest pressure occurring during periods of increased stress. She has chronic symptoms of shortness of breath, but it has slightly progressed over the last year. She admits to having gained about 20 pounds since 2021. She is active taking care of family, but otherwise does not do any significant cardio activity on exercise. No known prior history of coronary artery disease or heart failure    Family history  Father - "some kind of heart problem", but unknown medical history,  at 79. Mother - Afib, HTN, DM2, thyroid problems. no known CAD. Alive at 80.  5 brothers, 1 sister  Brother - irregular heart beat. HTN, DM2.  No CAD  Sister - has cancer  Oldest brother - parkinsons, stroke    Historical Information   Past Medical History:   Diagnosis Date   • Colon polyp    • Diabetes mellitus ([address])    • Fatty liver    • GERD (gastroesophageal reflux disease)    • Hypertension      Past Surgical History:   Procedure Laterality Date   • BACK SURGERY     • COLONOSCOPY     • FINGER SURGERY     • SINUS SURGERY     • TUBAL LIGATION     • UPPER GASTROINTESTINAL ENDOSCOPY       Family History   Problem Relation Age of Onset   • Heart disease Mother    • Atrial fibrillation Mother    • Thyroid disease Mother    • Coronary artery disease Father    • Diabetes Father      Current Outpatient Medications on File Prior to Visit   Medication Sig Dispense Refill   • allopurinol (ZYLOPRIM) 300 mg tablet Take 300 mg by mouth daily     • amLODIPine (NORVASC) 10 mg tablet TAKE 1 TABLET BY MOUTH EVERY DAY 90 tablet 1   • benazepril (LOTENSIN) 40 MG tablet TAKE 1 TABLET BY MOUTH EVERY DAY 90 tablet 1   • desloratadine (CLARINEX) 5 MG tablet TAKE ONE TABLET BY MOUTH DAILY 90 tablet 1   • dicyclomine (BENTYL) 10 mg capsule Take 1 capsule (10 mg total) by mouth 4 (four) times a day (before meals and at bedtime) 30 capsule 0   • fluticasone (FLONASE) 50 mcg/act nasal spray ONE SPRAY IN EACH NOSTRIL EVERY DAY 16 g 3   • ibuprofen (MOTRIN) 800 mg tablet Take 1 tablet (800 mg total) by mouth 3 (three) times a day Take with meals 21 tablet 0   • metFORMIN (GLUCOPHAGE) 500 mg tablet TAKE 1 TABLET BY MOUTH TWICE A DAY WITH MEALS 180 tablet 1   • montelukast (SINGULAIR) 10 mg tablet TAKE ONE TABLET BY MOUTH DAILY AT BEDTIME 90 tablet 3   • omeprazole (PriLOSEC) 20 mg delayed release capsule TAKE ONE CAPSULE 1/2 HOUR BEFORE BREAKFAST AND ONE CAPSULE 1/2 HOUR BEFORE DINNER 180 capsule 1   • ondansetron (Zofran ODT) 4 mg disintegrating tablet Take 1 tablet (4 mg total) by mouth every 6 (six) hours as needed for nausea or vomiting 20 tablet 0   • pravastatin (PRAVACHOL) 10 mg tablet TAKE 1 TABLET BY MOUTH EVERYDAY AT BEDTIME 90 tablet 1     No current facility-administered medications on file prior to visit.      Allergies   Allergen Reactions   • Penicillins      Social History     Socioeconomic History   • Marital status: Single     Spouse name: None   • Number of children: None   • Years of education: None   • Highest education level: None   Occupational History   • None   Tobacco Use   • Smoking status: Never   • Smokeless tobacco: Never   Vaping Use   • Vaping Use: Never used   Substance and Sexual Activity   • Alcohol use: Never   • Drug use: Never   • Sexual activity: None   Other Topics Concern   • None   Social History Narrative   • None     Social Determinants of Health     Financial Resource Strain: Low Risk  (8/10/2022)    Overall Financial Resource Strain (CARDIA)    • Difficulty of Paying Living Expenses: Not hard at all   Food Insecurity: Not on file   Transportation Needs: No Transportation Needs (8/10/2022)    PRAPARE - Transportation    • Lack of Transportation (Medical): No    • Lack of Transportation (Non-Medical): No   Physical Activity: Not on file   Stress: Not on file   Social Connections: Not on file   Intimate Partner Violence: Not on file   Housing Stability: Not on file        Review of Systems   All other systems reviewed and are negative. Vitals:  Vitals:    10/03/23 0953   BP: 126/80   BP Location: Left arm   Patient Position: Sitting   Cuff Size: Large   Pulse: 98   SpO2: 96%   Weight: 135 kg (297 lb)   Height: 5' 8" (1.727 m)     BMI - Body mass index is 45.16 kg/m². Wt Readings from Last 7 Encounters:   10/03/23 135 kg (297 lb)   06/08/23 (!) 137 kg (302 lb)   05/25/23 (!) 137 kg (302 lb)   02/22/23 (!) 136 kg (300 lb 11.2 oz)   02/21/23 (!) 139 kg (305 lb 9.6 oz)   01/05/23 136 kg (300 lb)   11/11/22 134 kg (296 lb)       Physical Exam  Vitals and nursing note reviewed. Constitutional:       General: She is not in acute distress. Appearance: Normal appearance. She is well-developed. She is obese. She is not ill-appearing. HENT:      Head: Normocephalic and atraumatic. Nose: No congestion. Eyes:      General: No scleral icterus. Conjunctiva/sclera: Conjunctivae normal.   Neck:      Vascular: No carotid bruit or JVD. Cardiovascular:      Rate and Rhythm: Normal rate and regular rhythm. Pulses: Normal pulses. Heart sounds: Normal heart sounds. No murmur heard. No friction rub. No gallop. Pulmonary:      Effort: Pulmonary effort is normal. No respiratory distress. Breath sounds: Normal breath sounds. No rales. Abdominal:      General: There is no distension. Palpations: Abdomen is soft. Tenderness: There is no abdominal tenderness. Musculoskeletal:         General: No swelling or tenderness. Cervical back: Neck supple. Right lower leg: No edema. Left lower leg: No edema. Skin:     General: Skin is warm. Neurological:      General: No focal deficit present. Mental Status: She is alert and oriented to person, place, and time. Mental status is at baseline. Psychiatric:         Mood and Affect: Mood normal.         Behavior: Behavior normal.         Thought Content: Thought content normal.           Labs:  CBC:   Lab Results   Component Value Date    WBC 10.60 (H) 11/11/2022    RBC 4.60 11/11/2022    HGB 13.7 11/11/2022    HCT 43.1 11/11/2022    MCV 94 11/11/2022     (H) 11/11/2022    RDW 13.8 11/11/2022       CMP:   Lab Results   Component Value Date     (L) 06/10/2015    K 4.4 05/18/2023     05/18/2023    CO2 26 05/18/2023    ANIONGAP 8 06/10/2015    BUN 16 05/18/2023    CREATININE 0.77 05/18/2023    EGFR 84 05/18/2023    GLUCOSE 110 06/10/2015    CALCIUM 9.2 05/18/2023    AST 41 05/18/2023    ALT 37 05/18/2023    ALKPHOS 123 (H) 05/18/2023    PROT 8.2 06/10/2015    BILITOT 0.31 06/10/2015       Magnesium:  No results found for: "MG"    Lipid Profile:   Lab Results   Component Value Date    CHOL 132 06/10/2015    HDL 62 05/18/2023    TRIG 118 05/18/2023    LDLCALC 43 05/18/2023       Thyroid Studies:   Lab Results   Component Value Date    JQF2ORLMKDCH 1.640 03/15/2022    FREET4 1.16 05/18/2020       A1c:  No components found for: "HGA1C"    INR:  Lab Results   Component Value Date    INR 1.05 10/16/2020   5    Imaging: No results found. Cardiac testing:   No results found for this or any previous visit. No results found for this or any previous visit. No results found for this or any previous visit. No results found for this or any previous visit. Holter monitor  INDICATIONS: tachycardia    DESCRIPTION OF FINDINGS:  The patient was in normal sinus rhythm throughout the study. The average heart rate was 88 beats per minute. The heart rate ranged from   62 to 129 beats per minute.     Ventricular ectopic activity consisted of 1 beats (<0.1% of total beats). There was no sustained or nonsustained ventricular tachycardia. Supraventricular ectopic activity consisted of 86 beats (0.1% of total   beats). There was one 3 beat atrial run noted. There was no evidence of atrial fibrillation or atrial flutter. There were no significant pauses. There was no evidence of advanced degree   heart block. No symptoms reported. Impression:   Normal sinus rhythm  Rare ectopy as noted above  One 3 beat atrial run noted  No other significant arrhythmia noted  No symptoms reported.   -----  Huey Gilmore MD [facility] [place]

## 2023-10-12 ENCOUNTER — HOSPITAL ENCOUNTER (OUTPATIENT)
Dept: NON INVASIVE DIAGNOSTICS | Facility: CLINIC | Age: 63
Discharge: HOME | End: 2023-10-12
Payer: COMMERCIAL

## 2023-10-12 VITALS
SYSTOLIC BLOOD PRESSURE: 122 MMHG | OXYGEN SATURATION: 99 % | DIASTOLIC BLOOD PRESSURE: 76 MMHG | WEIGHT: 293 LBS | HEIGHT: 68 IN | HEART RATE: 96 BPM | BODY MASS INDEX: 44.41 KG/M2

## 2023-10-12 DIAGNOSIS — R07.89 CHEST HEAVINESS: ICD-10-CM

## 2023-10-12 DIAGNOSIS — R06.02 SOB (SHORTNESS OF BREATH) ON EXERTION: ICD-10-CM

## 2023-10-12 LAB
CHEST PAIN STATEMENT: NORMAL
MAX DIASTOLIC BP: 68 MMHG
MAX HEART RATE: 134 BPM
MAX PREDICTED HEART RATE: 160 BPM
MAX. SYSTOLIC BP: 154 MMHG
NUC STRESS DIASTOLIC VOLUME INDEX: 57 ML/M2
NUC STRESS EJECTION FRACTION: 72 %
NUC STRESS SYSTOLIC VOLUME INDEX: 16 ML/M2
PROTOCOL NAME: NORMAL
RATE PRESSURE PRODUCT: NORMAL
REASON FOR TERMINATION: NORMAL
SL CV REST NUCLEAR ISOTOPE DOSE: 15.23 MCI
SL CV STRESS NUCLEAR ISOTOPE DOSE: 48.9 MCI
SL CV STRESS RECOVERY BP: NORMAL MMHG
SL CV STRESS RECOVERY HR: 113 BPM
SL CV STRESS RECOVERY O2 SAT: 99 %
STRESS ANGINA INDEX: 0
STRESS BASELINE BP: NORMAL MMHG
STRESS BASELINE HR: 96 BPM
STRESS O2 SAT REST: 99 %
STRESS PEAK HR: 134 BPM
STRESS POST O2 SAT PEAK: 99 %
STRESS POST PEAK BP: 154 MMHG
STRESS/REST PERFUSION RATIO: 0.89
TARGET HR FORMULA: NORMAL
TIME IN EXERCISE PHASE: NORMAL

## 2023-10-12 PROCEDURE — 78452 HT MUSCLE IMAGE SPECT MULT: CPT | Performed by: INTERNAL MEDICINE

## 2023-10-12 PROCEDURE — 93017 CV STRESS TEST TRACING ONLY: CPT

## 2023-10-12 PROCEDURE — G1004 CDSM NDSC: HCPCS

## 2023-10-12 PROCEDURE — A9502 TC99M TETROFOSMIN: HCPCS

## 2023-10-12 PROCEDURE — 93018 CV STRESS TEST I&R ONLY: CPT | Performed by: INTERNAL MEDICINE

## 2023-10-12 PROCEDURE — 93016 CV STRESS TEST SUPVJ ONLY: CPT | Performed by: INTERNAL MEDICINE

## 2023-10-12 PROCEDURE — 78452 HT MUSCLE IMAGE SPECT MULT: CPT

## 2023-10-12 RX ORDER — REGADENOSON 0.08 MG/ML
0.4 INJECTION, SOLUTION INTRAVENOUS ONCE
Status: COMPLETED | OUTPATIENT
Start: 2023-10-12 | End: 2023-10-12

## 2023-10-12 RX ADMIN — REGADENOSON 0.4 MG: 0.08 INJECTION, SOLUTION INTRAVENOUS at 09:12

## 2023-10-17 ENCOUNTER — IMMUNIZATIONS (OUTPATIENT)
Dept: FAMILY MEDICINE CLINIC | Facility: MEDICAL CENTER | Age: 63
End: 2023-10-17
Payer: COMMERCIAL

## 2023-10-17 DIAGNOSIS — Z23 ENCOUNTER FOR IMMUNIZATION: Primary | ICD-10-CM

## 2023-10-17 PROCEDURE — G0008 ADMIN INFLUENZA VIRUS VAC: HCPCS

## 2023-10-17 PROCEDURE — 90686 IIV4 VACC NO PRSV 0.5 ML IM: CPT

## 2023-10-27 DIAGNOSIS — J30.89 ENVIRONMENTAL AND SEASONAL ALLERGIES: ICD-10-CM

## 2023-10-27 RX ORDER — FLUTICASONE PROPIONATE 50 MCG
SPRAY, SUSPENSION (ML) NASAL
Qty: 16 G | Refills: 1 | Status: SHIPPED | OUTPATIENT
Start: 2023-10-27

## 2023-11-07 ENCOUNTER — OFFICE VISIT (OUTPATIENT)
Dept: FAMILY MEDICINE CLINIC | Facility: MEDICAL CENTER | Age: 63
End: 2023-11-07
Payer: COMMERCIAL

## 2023-11-07 VITALS
WEIGHT: 293 LBS | BODY MASS INDEX: 44.41 KG/M2 | SYSTOLIC BLOOD PRESSURE: 122 MMHG | HEIGHT: 68 IN | OXYGEN SATURATION: 97 % | HEART RATE: 108 BPM | TEMPERATURE: 97.7 F | DIASTOLIC BLOOD PRESSURE: 82 MMHG

## 2023-11-07 DIAGNOSIS — Z00.00 MEDICARE ANNUAL WELLNESS VISIT, SUBSEQUENT: Primary | ICD-10-CM

## 2023-11-07 DIAGNOSIS — L60.3 BRITTLE NAILS: ICD-10-CM

## 2023-11-07 DIAGNOSIS — I10 PRIMARY HYPERTENSION: ICD-10-CM

## 2023-11-07 DIAGNOSIS — E11.9 TYPE 2 DIABETES MELLITUS WITHOUT COMPLICATION, WITHOUT LONG-TERM CURRENT USE OF INSULIN (HCC): ICD-10-CM

## 2023-11-07 DIAGNOSIS — K03.89: ICD-10-CM

## 2023-11-07 LAB — SL AMB POCT HEMOGLOBIN AIC: 6.2 (ref ?–6.5)

## 2023-11-07 PROCEDURE — G0439 PPPS, SUBSEQ VISIT: HCPCS

## 2023-11-07 PROCEDURE — 83036 HEMOGLOBIN GLYCOSYLATED A1C: CPT

## 2023-11-07 PROCEDURE — 99214 OFFICE O/P EST MOD 30 MIN: CPT

## 2023-11-07 NOTE — PROGRESS NOTES
Assessment and Plan:   Recommended patient add vitamin D, calcium and magnesium supplements. Check labs as ordered. Encouraged healthy diet, increase exercise and weight loss. Return in 3 months for A1c recheck and to further discuss starting medication such as Ozempic or Mounjaro. Mammogram declined. Cervical cancer screening declined. Colonoscopy up-to-date. COVID, PCV 20 vaccines declined. Advised patient to schedule visit with dentist due to brittle teeth and eye doctor for diabetic eye exam, to be done yearly. Problem List Items Addressed This Visit     HTN (hypertension)    Diabetes ([address])    Relevant Orders    POCT hemoglobin A1c (Completed)    Comprehensive metabolic panel   Other Visit Diagnoses     Medicare annual wellness visit, subsequent    -  Primary    Brittle nails        Relevant Orders    CBC and differential    TSH, 3rd generation with Free T4 reflex    Comprehensive metabolic panel    Brittle teeth        Relevant Orders    CBC and differential    TSH, 3rd generation with Free T4 reflex    Comprehensive metabolic panel          Depression Screening and Follow-up Plan: Patient was screened for depression during today's encounter. They screened negative with a PHQ-2 score of 0. Preventive health issues were discussed with patient, and age appropriate screening tests were ordered as noted in patient's After Visit Summary. Personalized health advice and appropriate referrals for health education or preventive services given if needed, as noted in patient's After Visit Summary. History of Present Illness:     Patient presents for a Medicare Wellness Visit    63-year-old female presents for annual Medicare wellness visit. She has diabetes mellitus type 2, currently stable with metformin. She is obese. She tells me she follows a healthy diet but does not exercise. She tells me her daughter is currently on Ozempic and is doing well with weight loss.   She asks if this is something we can consider for her due to obesity and diabetes. Does not wish to see diabetic educator at this time as she does not have time. Cardiology also recommended weight loss to patient at visit last month. She has hypertension, under good control with current management. Continue amlodipine and benazepril. She reports brittle nails and teeth as well as leg cramps bilaterally. She is not currently taking any vitamin D or calcium supplements. Otherwise, she is doing well overall and denies any difficulties financially, with transportation. She declines mammogram, cervical cancer screening, covid and pcv 20 vaccines. Patient Care Team:  Carmen Patton as PCP - General (Nurse Practitioner)  Flori Salazar MD (Gastroenterology)     Review of Systems:     Review of Systems   Constitutional: Negative. HENT: Negative. Eyes: Negative. Respiratory: Negative. Cardiovascular: Negative. Gastrointestinal: Negative. Endocrine: Negative. Genitourinary: Negative. Musculoskeletal: Negative. Skin: Negative. Allergic/Immunologic: Negative. Neurological: Negative. Hematological: Negative. Psychiatric/Behavioral: Negative.           Problem List:     Patient Active Problem List   Diagnosis   • Cervical spondylosis   • HTN (hypertension)   • GERD (gastroesophageal reflux disease)   • Morbid obesity ([address])   • Environmental and seasonal allergies   • Diabetes ([address])   • Gout   • Leukocytosis   • Thrombocytosis   • Pain and swelling of lower leg   • Bilateral lower extremity edema      Past Medical and Surgical History:     Past Medical History:   Diagnosis Date   • Colon polyp    • Diabetes mellitus ([address])    • Fatty liver    • GERD (gastroesophageal reflux disease)    • Hypertension      Past Surgical History:   Procedure Laterality Date   • BACK SURGERY     • COLONOSCOPY     • FINGER SURGERY     • SINUS SURGERY     • TUBAL LIGATION     • UPPER GASTROINTESTINAL ENDOSCOPY        Family History:     Family History   Problem Relation Age of Onset   • Heart disease Mother    • Atrial fibrillation Mother    • Thyroid disease Mother    • Coronary artery disease Father    • Diabetes Father       Social History:     Social History     Socioeconomic History   • Marital status: Single     Spouse name: None   • Number of children: None   • Years of education: None   • Highest education level: None   Occupational History   • None   Tobacco Use   • Smoking status: Never   • Smokeless tobacco: Never   Vaping Use   • Vaping Use: Never used   Substance and Sexual Activity   • Alcohol use: Never   • Drug use: Never   • Sexual activity: None   Other Topics Concern   • None   Social History Narrative   • None     Social Determinants of Health     Financial Resource Strain: High Risk (11/7/2023)    Overall Financial Resource Strain (CARDIA)    • Difficulty of Paying Living Expenses: Very hard   Food Insecurity: Not on file   Transportation Needs: No Transportation Needs (11/7/2023)    PRAPARE - Transportation    • Lack of Transportation (Medical): No    • Lack of Transportation (Non-Medical):  No   Physical Activity: Not on file   Stress: Not on file   Social Connections: Not on file   Intimate Partner Violence: Not on file   Housing Stability: Not on file      Medications and Allergies:     Current Outpatient Medications   Medication Sig Dispense Refill   • allopurinol (ZYLOPRIM) 300 mg tablet Take 300 mg by mouth daily     • amLODIPine (NORVASC) 10 mg tablet TAKE 1 TABLET BY MOUTH EVERY DAY 90 tablet 1   • benazepril (LOTENSIN) 40 MG tablet TAKE 1 TABLET BY MOUTH EVERY DAY 90 tablet 1   • desloratadine (CLARINEX) 5 MG tablet TAKE ONE TABLET BY MOUTH DAILY 90 tablet 1   • dicyclomine (BENTYL) 10 mg capsule Take 1 capsule (10 mg total) by mouth 4 (four) times a day (before meals and at bedtime) 30 capsule 0   • fluticasone (FLONASE) 50 mcg/act nasal spray ONE SPRAY IN EACH NOSTRIL EVERY DAY 16 g 1   • ibuprofen (MOTRIN) 800 mg tablet Take 1 tablet (800 mg total) by mouth 3 (three) times a day Take with meals 21 tablet 0   • metFORMIN (GLUCOPHAGE) 500 mg tablet TAKE 1 TABLET BY MOUTH TWICE A DAY WITH MEALS 180 tablet 1   • montelukast (SINGULAIR) 10 mg tablet TAKE ONE TABLET BY MOUTH DAILY AT BEDTIME 90 tablet 3   • omeprazole (PriLOSEC) 20 mg delayed release capsule TAKE ONE CAPSULE 1/2 HOUR BEFORE BREAKFAST AND ONE CAPSULE 1/2 HOUR BEFORE DINNER 180 capsule 1   • ondansetron (Zofran ODT) 4 mg disintegrating tablet Take 1 tablet (4 mg total) by mouth every 6 (six) hours as needed for nausea or vomiting 20 tablet 0   • pravastatin (PRAVACHOL) 10 mg tablet TAKE 1 TABLET BY MOUTH EVERYDAY AT BEDTIME 90 tablet 1     No current facility-administered medications for this visit. Allergies   Allergen Reactions   • Penicillins       Immunizations:     Immunization History   Administered Date(s) Administered   • INFLUENZA 10/17/2017, 10/19/2018, 11/01/2021, 11/11/2022   • Influenza, injectable, quadrivalent, preservative free 0.5 mL 10/17/2023   • Influenza, recombinant, quadrivalent,injectable, preservative free 09/22/2020, 11/11/2022   • Pneumococcal Polysaccharide PPV23 09/22/2020      Health Maintenance:         Topic Date Due   • Cervical Cancer Screening  12/07/2023 (Originally 11/9/1981)   • Breast Cancer Screening: Mammogram  02/21/2024 (Originally [birth year]/2002)   • Colorectal Cancer Screening  02/20/2030   • HIV Screening  Completed   • Hepatitis C Screening  Completed     There are no preventive care reminders to display for this patient. Medicare Screening Tests and Risk Assessments:     Shant is here for her Subsequent Wellness visit. Health Risk Assessment:   Patient rates overall health as good. Patient feels that their physical health rating is same. Patient is very satisfied with their life. Eyesight was rated as same. Hearing was rated as same.  Patient feels that their emotional and mental health rating is same. Patients states they are never, rarely angry. Patient states they are often unusually tired/fatigued. Pain experienced in the last 7 days has been some. Patient's pain rating has been 8/10. Patient states that she has experienced no weight loss or gain in last 6 months. Depression Screening:   PHQ-2 Score: 0      Fall Risk Screening: In the past year, patient has experienced: no history of falling in past year      Urinary Incontinence Screening:   Patient has not leaked urine accidently in the last six months. Home Safety:  Patient does not have trouble with stairs inside or outside of their home. Patient has working smoke alarms and has working carbon monoxide detector. Home safety hazards include: none. Nutrition:   Current diet is Diabetic. Medications:   Patient is not currently taking any over-the-counter supplements. Patient is able to manage medications. Activities of Daily Living (ADLs)/Instrumental Activities of Daily Living (IADLs):   Walk and transfer into and out of bed and chair?: Yes  Dress and groom yourself?: Yes    Bathe or shower yourself?: Yes    Feed yourself?  Yes  Do your laundry/housekeeping?: Yes  Manage your money, pay your bills and track your expenses?: Yes  Make your own meals?: Yes    Do your own shopping?: Yes    Previous Hospitalizations:   Any hospitalizations or ED visits within the last 12 months?: No      Advance Care Planning:   Living will: No    Durable POA for healthcare: No    Advanced directive: No      Cognitive Screening:   Provider or family/friend/caregiver concerned regarding cognition?: No    PREVENTIVE SCREENINGS      Cardiovascular Screening:    General: Screening Current      Diabetes Screening:     General: Screening Not Indicated and History Diabetes      Colorectal Cancer Screening:     General: Screening Current      Breast Cancer Screening:     General: Patient Declines    Due for: Mammogram        Cervical Cancer Screening: General: Patient Declines    Due for: Cervical Pap Smear      Abdominal Aortic Aneurysm (AAA) Screening:        General: Screening Not Indicated      Lung Cancer Screening:     General: Screening Not Indicated      Hepatitis C Screening:    General: Screening Current    Screening, Brief Intervention, and Referral to Treatment (SBIRT)    Screening  Typical number of drinks in a day: 0  Typical number of drinks in a week: 0  Interpretation: Low risk drinking behavior. Single Item Drug Screening:  How often have you used an illegal drug (including marijuana) or a prescription medication for non-medical reasons in the past year? never    Single Item Drug Screen Score: 0  Interpretation: Negative screen for possible drug use disorder    Other Counseling Topics:   Car/seat belt/driving safety and calcium and vitamin D intake and regular weightbearing exercise. No results found. Physical Exam:     /82 (BP Location: Left arm, Patient Position: Sitting, Cuff Size: Large)   Pulse (!) 108   Temp 97.7 °F (36.5 °C)   Ht 5' 8" (1.727 m)   Wt 136 kg (298 lb 12.8 oz)   SpO2 97%   BMI 45.43 kg/m²     Physical Exam  Vitals and nursing note reviewed. Constitutional:       General: She is not in acute distress. Appearance: Normal appearance. She is obese. She is not ill-appearing. HENT:      Head: Normocephalic and atraumatic. Cardiovascular:      Rate and Rhythm: Normal rate and regular rhythm. Pulses: Normal pulses. Heart sounds: Normal heart sounds. Pulmonary:      Effort: Pulmonary effort is normal.      Breath sounds: Normal breath sounds. Skin:     General: Skin is warm and dry. Neurological:      General: No focal deficit present. Mental Status: She is alert and oriented to person, place, and time. Mental status is at baseline. Gait: Gait normal.   Psychiatric:         Mood and Affect: Mood normal.         Behavior: Behavior normal.         Thought Content:  Thought content normal.          RAHEL Putnam

## 2023-11-20 DIAGNOSIS — E11.9 TYPE 2 DIABETES MELLITUS WITHOUT COMPLICATION, WITHOUT LONG-TERM CURRENT USE OF INSULIN (HCC): ICD-10-CM

## 2023-11-20 DIAGNOSIS — K21.9 GASTROESOPHAGEAL REFLUX DISEASE WITHOUT ESOPHAGITIS: ICD-10-CM

## 2023-11-21 RX ORDER — PRAVASTATIN SODIUM 10 MG
TABLET ORAL
Qty: 90 TABLET | Refills: 1 | Status: SHIPPED | OUTPATIENT
Start: 2023-11-21

## 2023-11-21 RX ORDER — OMEPRAZOLE 20 MG/1
CAPSULE, DELAYED RELEASE ORAL
Qty: 180 CAPSULE | Refills: 1 | Status: SHIPPED | OUTPATIENT
Start: 2023-11-21

## 2023-12-19 ENCOUNTER — TELEPHONE (OUTPATIENT)
Age: 63
End: 2023-12-19

## 2023-12-19 NOTE — TELEPHONE ENCOUNTER
The patient called she tested positive for covid today she has been sick since Sunday   --the patient has head congestion  a sore throat   fatigue and aches and pains   -she has a non productive cough    she is taking cloricidin hbt max strength  it is not helping  please advise thank you

## 2023-12-20 ENCOUNTER — TELEMEDICINE (OUTPATIENT)
Dept: FAMILY MEDICINE CLINIC | Facility: MEDICAL CENTER | Age: 63
End: 2023-12-20
Payer: COMMERCIAL

## 2023-12-20 DIAGNOSIS — U07.1 COVID-19: Primary | ICD-10-CM

## 2023-12-20 DIAGNOSIS — I10 ESSENTIAL HYPERTENSION: ICD-10-CM

## 2023-12-20 DIAGNOSIS — R05.1 ACUTE COUGH: ICD-10-CM

## 2023-12-20 DIAGNOSIS — J34.89 RHINORRHEA: ICD-10-CM

## 2023-12-20 PROCEDURE — 99213 OFFICE O/P EST LOW 20 MIN: CPT

## 2023-12-20 RX ORDER — AMLODIPINE BESYLATE 10 MG/1
TABLET ORAL
Qty: 90 TABLET | Refills: 1 | Status: SHIPPED | OUTPATIENT
Start: 2023-12-20

## 2023-12-20 RX ORDER — MOLNUPIRAVIR 200 MG/1
800 CAPSULE ORAL EVERY 12 HOURS
Qty: 40 CAPSULE | Refills: 0 | Status: SHIPPED | OUTPATIENT
Start: 2023-12-20 | End: 2023-12-25

## 2023-12-20 RX ORDER — IPRATROPIUM BROMIDE 21 UG/1
2 SPRAY, METERED NASAL EVERY 12 HOURS
Qty: 30 ML | Refills: 0 | Status: SHIPPED | OUTPATIENT
Start: 2023-12-20

## 2023-12-20 RX ORDER — DEXTROMETHORPHAN HYDROBROMIDE AND PROMETHAZINE HYDROCHLORIDE 15; 6.25 MG/5ML; MG/5ML
5 SYRUP ORAL 4 TIMES DAILY PRN
Qty: 118 ML | Refills: 0 | Status: SHIPPED | OUTPATIENT
Start: 2023-12-20

## 2023-12-20 NOTE — PROGRESS NOTES
COVID-19 Outpatient Progress Note    Assessment/Plan:    Problem List Items Addressed This Visit    None  Visit Diagnoses     COVID-19    -  Primary    Relevant Medications    molnupiravir (Lagevrio) 200 mg capsule    Acute cough        Relevant Medications    promethazine-dextromethorphan (PHENERGAN-DM) 6.25-15 mg/5 mL oral syrup    Rhinorrhea        Relevant Medications    ipratropium (ATROVENT) 0.03 % nasal spray         Disposition:     Discussed symptom directed medication options with patient.     Patient meets criteria for Molnupiravir and they have been counseled according to EUA documentation provided by the FDA. After discussion, patient agrees to treatment.    Molnupiravir is an investigational medicine used to treat mild-to-moderate COVID-19 in adults with positive results of direct SARS-CoV-2 viral testing, and who are at high risk for progressing to severe COVID-19 including hospitalization or death, and for whom other COVID-19 treatment options authorized by the FDA are not accessible or clinically appropriate.    The FDA has authorized the emergency use of molnupiravir for the treatment of mild-tomoderate COVID-19 in adults under an EUA.    Molnupiravir is not authorized:  - for use in people less than 18 years of age.  - for prevention of COVID-19.  - for people needing hospitalization for COVID-19.  - for use for longer than 5 consecutive days    What is the most important information I should know about molnupiravir?    Molnupiravir may cause serious side effects, including:    Molnupiravir may cause harm to your unborn baby. It is not known if molnupiravir will harm your baby if you take molnupiravir during pregnancy.  - Molnupiravir is not recommended for use in pregnancy.  - Molnupiravir has not been studied in pregnancy. Molnupiravir was studied in pregnant animals only. When molnupiravir was given to pregnant animals, molnupiravir caused harm to their unborn babies.  - You and your healthcare  provider may decide that you should take molnupiravir duringpregnancy if there are no other COVID-19 treatment options authorized by the FDA that are accessible or clinically appropriate for you.  - If you and your healthcare provider decide that you should take molnupiravir during pregnancy, you and your healthcare provider should discuss the known and potential benefits and the potential risks of taking molnupiravir during pregnancy.    For individuals who are able to become pregnant:  - You should use a reliable method of birth control (contraception) consistently and correctly during treatment with molnupiravir and for 4 days after the last dose of molnupiravir. Talk to your healthcare provider about reliable birth control methods.  - Before starting treatment with molnupiravir your healthcare provider may do a pregnancy test to see if you are pregnant before starting treatment with molnupiravir.  - Tell your healthcare provider right away if you become pregnant or think you may be pregnant during treatment with molnupiravir.    Pregnancy Surveillance Program:  - There is a pregnancy surveillance program for individuals who take molnupiravir during pregnancy. The purpose of this program is to collect information about the health of you and your baby. Talk to your healthcare provider about how to take part in this program.  - If you take molnupiravir during pregnancy and you agree to participate in the pregnancy surveillance program and allow your healthcare provider to share your information with [employer], then your healthcare provider will report your use of molnupiravir during pregnancy to [employer]. by calling [phone] or [email or web address].    For individuals who are sexually active with partners who are able to become pregnant:  - It is not known if molnupiravir can affect sperm. While the risk is regarded as low, animal studies to fully assess the potential for  molnupiravir to affect the babies of males treated with molnupiravir have not been completed. A reliable method of birth control (contraception) should be used consistently and correctly during treatment with molnupiravir and for at least 3 months after the last dose. The risk to sperm beyond 3 months is not known. Studies to understand the risk to sperm beyond 3 months are ongoing. Talk to your healthcare provider about reliable birth control methods. Talk to your healthcare provider if you have questions or concerns about how molnupiravir may affect sperm.    How do I take molnupiravir?    - Take molnupiravir exactly as your healthcare provider tells you to take it.  - Take 4 capsules of molnupiravir every 12 hours (for example, at 8 am and at 8 pm)  - Take molnupiravir for 5 days. It is important that you complete the full 5 days of treatment with molnupiravir. Do not stop taking molnupiravir before you complete the full 5 days of treatment, even if you feel better.  - Take molnupiravir with or without food.  - You should stay in isolation for as long as your healthcare provider tells you to. Talk to your healthcare provider if you are not sure about how to properly isolate while you have COVID-19.  - Swallow molnupiravir capsules whole. Do not open, break, or crush the capsules. If you cannot swallow capsules whole, tell your healthcare provider.    What to do if you miss a dose:  - If it has been less than 10 hours since the missed dose, take it as soon as you remember  - If it has been more than 10 hours since the missed dose, skip the missed dose and take your dose at the next scheduled time.  - Do not double the dose of molnupiravir to make up for a missed dose.    What are the important possible side effects of molnupiravir?    Possible side effects of molnupiravir are:  - See, “What is the most important information I should know about molnupiravir?”  - Diarrhea  - Nausea  - Dizziness    These are not all  the possible side effects of molnupiravir. Not many people have taken molnupiravir. Serious and unexpected side effects may happen. This medicine is still being studied, so it is possible that all of the risks are not known at this time.    What other treatment choices are there?    Like molnupiravir, FDA may allow for the emergency use of other medicines to treat people with COVID-19. Go to https://www.fda.gov/emergency-preparedness-and-response/mcm-legalregulatory-and-policy-framework/emergency-use-authorization for more information.  It is your choice to be treated or not to be treated with molnupiravir. Should you decide not to take it, it will not change your standard medical care.    What if I am breastfeeding?  Breastfeeding is not recommended during treatment with molnupiravir and for 4 days after the last dose of molnupiravir. If you are breastfeeding or plan to breastfeed, talk to your healthcare provider about your options and specific situation before taking molnupiravir.    How do I report side effects with molnupiravir?    Contact your healthcare provider if you have any side effects that bother you or do not go away. Report side effects to FDA MedWatch at www.fda.gov/medwatch or call 9-290-SYX-5377 (1-363.460.1142).    How should I store molnupiravir?  - Store molnupiravir capsules at room temperature between 68°F to 77°F (20°C to 25°C).  - Keep molnupiravir and all medicines out of the reach of children and pets.    Full fact sheet for patients, parents, and caregivers can be found at: https://www.fda.gov/media/070069/download    I have spent a total time of 15 minutes on the day of the encounter for this patient including discussing diagnostic results, risks and benefits of treatment options, instructions for management, patient and family education, importance of treatment compliance, documenting in the medical record and obtaining or reviewing history. Use of Coricidin OTC for symptom relief.  May  use ipratropium nasal spray as directed.  May use promethazine cough medication as needed.  Molnupiravir sent to pharmacy.  Advised to proceed to the emergency department or urgent care setting for further evaluation if symptoms worsen  such as worsening shortness of breath, chest pain, fevers that are not responsive to antipyretics.  Advised about    Encounter provider: RAHEL Kim     Provider located at: [place] [address] [address] [facility] [place] [address]     Recent Visits  No visits were found meeting these conditions.  Showing recent visits within past 7 days and meeting all other requirements  Today's Visits  Date Type Provider Dept   12/20/23 Telemedicine RAHEL Kim [facility]   Showing today's visits and meeting all other requirements  Future Appointments  No visits were found meeting these conditions.  Showing future appointments within next 150 days and meeting all other requirements     This virtual check-in was done via [employer] and patient was informed that this is a secure, HIPAA-compliant platform. She agrees to proceed.    Patient agrees to participate in a virtual check in via telephone or video visit instead of presenting to the office to address urgent/immediate medical needs. Patient is aware this is a billable service. She acknowledged consent and understanding of privacy and security of the video platform. The patient has agreed to participate and understands they can discontinue the visit at any time.    After connecting through [employer], the patient was identified by name and date of birth. Mili Jose was informed that this was a telemedicine visit and that the exam was being conducted confidentially over secure lines. My office door was closed. No one else was in the room. Mili Jose acknowledged consent and understanding of privacy and security of the telemedicine visit. I informed the patient that  "I have reviewed her record in Epic and presented the opportunity for her to ask any questions regarding the visit today. The patient agreed to participate.     Verification of patient location:  Patient is located in the following state in which I hold an active license: [place]    Subjective:   Mili Jose is a 63 y.o. female who is concerned about COVID-19. Patient's symptoms include fever (TMAX 102), chills, fatigue, nasal congestion, rhinorrhea, sore throat, cough, shortness of breath (\"a little bit, not alot\"), abdominal pain, nausea, myalgias and headache. Patient denies anosmia, loss of taste, chest tightness, vomiting and diarrhea.     - Date of symptom onset: 12/17/2023      COVID-19 vaccination status: Not vaccinated    Exposure:   Contact with a person who is under investigation (PUI) for or who is positive for COVID-19 within the last 14 days?: No    Hospitalized recently for fever and/or lower respiratory symptoms?: No      Currently a healthcare worker that is involved in direct patient care?: No      Works in a special setting where the risk of COVID-19 transmission may be high? (this may include long-term care, correctional and FDC facilities; homeless shelters; assisted-living facilities and group homes.): No      Resident in a special setting where the risk of COVID-19 transmission may be high? (this may include long-term care, correctional and FDC facilities; homeless shelters; assisted-living facilities and group homes.): No      Home covid test positive 12/19/23.  Taking Coricidin, tussin. She tells me tussin does not seem to work and she is up throughout the night coughing.   Tylenol as needed for fever which is giving her relief.     Lab Results   Component Value Date    SARSCOV2 Negative 06/26/2022       Review of Systems   Constitutional:  Positive for chills, fatigue and fever (TMAX 102).   HENT:  Positive for congestion, rhinorrhea and sore throat.    Eyes: Negative.  " "  Respiratory:  Positive for cough and shortness of breath (\"a little bit, not alot\"). Negative for chest tightness.    Cardiovascular: Negative.    Gastrointestinal:  Positive for abdominal pain and nausea. Negative for diarrhea and vomiting.   Endocrine: Negative.    Genitourinary: Negative.    Musculoskeletal:  Positive for myalgias.   Skin: Negative.    Allergic/Immunologic: Negative.    Neurological:  Positive for headaches.   Hematological: Negative.    Psychiatric/Behavioral: Negative.       Current Outpatient Medications on File Prior to Visit   Medication Sig   • allopurinol (ZYLOPRIM) 300 mg tablet Take 300 mg by mouth daily   • amLODIPine (NORVASC) 10 mg tablet TAKE 1 TABLET BY MOUTH EVERY DAY   • benazepril (LOTENSIN) 40 MG tablet TAKE 1 TABLET BY MOUTH EVERY DAY   • desloratadine (CLARINEX) 5 MG tablet TAKE ONE TABLET BY MOUTH DAILY   • dicyclomine (BENTYL) 10 mg capsule Take 1 capsule (10 mg total) by mouth 4 (four) times a day (before meals and at bedtime)   • fluticasone (FLONASE) 50 mcg/act nasal spray ONE SPRAY IN EACH NOSTRIL EVERY DAY   • ibuprofen (MOTRIN) 800 mg tablet Take 1 tablet (800 mg total) by mouth 3 (three) times a day Take with meals   • metFORMIN (GLUCOPHAGE) 500 mg tablet TAKE 1 TABLET BY MOUTH TWICE A DAY WITH FOOD   • montelukast (SINGULAIR) 10 mg tablet TAKE ONE TABLET BY MOUTH DAILY AT BEDTIME   • omeprazole (PriLOSEC) 20 mg delayed release capsule TAKE ONE CAPSULE 1/2 HOUR BEFORE BREAKFAST AND ONE CAPSULE 1/2 HOUR BEFORE DINNER   • ondansetron (Zofran ODT) 4 mg disintegrating tablet Take 1 tablet (4 mg total) by mouth every 6 (six) hours as needed for nausea or vomiting   • pravastatin (PRAVACHOL) 10 mg tablet TAKE 1 TABLET BY MOUTH EVERYDAY AT BEDTIME       Objective:    There were no vitals taken for this visit.       Physical Exam  Constitutional:       General: She is not in acute distress.     Appearance: She is ill-appearing.   Pulmonary:      Effort: No respiratory " distress.   Neurological:      General: No focal deficit present.      Mental Status: She is alert. Mental status is at baseline.   Psychiatric:         Mood and Affect: Mood normal.         Behavior: Behavior normal.         Thought Content: Thought content normal.     Limited Physical Exam, Virtual Visit.   RAHEL Kim

## 2023-12-21 DIAGNOSIS — J30.89 ENVIRONMENTAL AND SEASONAL ALLERGIES: ICD-10-CM

## 2023-12-21 RX ORDER — FLUTICASONE PROPIONATE 50 MCG
SPRAY, SUSPENSION (ML) NASAL
Qty: 16 G | Refills: 1 | Status: SHIPPED | OUTPATIENT
Start: 2023-12-21

## 2023-12-28 ENCOUNTER — PROCEDURE VISIT (OUTPATIENT)
Dept: NEUROLOGY | Facility: CLINIC | Age: 63
End: 2023-12-28
Payer: COMMERCIAL

## 2023-12-28 DIAGNOSIS — M79.672 FOOT PAIN, LEFT: ICD-10-CM

## 2023-12-28 PROCEDURE — 95886 MUSC TEST DONE W/N TEST COMP: CPT | Performed by: PSYCHIATRY & NEUROLOGY

## 2023-12-28 PROCEDURE — 95909 NRV CNDJ TST 5-6 STUDIES: CPT | Performed by: PSYCHIATRY & NEUROLOGY

## 2023-12-28 NOTE — PROGRESS NOTES
EMG 1 Limb     Date/Time  12/28/2023 12:50 PM     Performed by  Juan Jose Carpio MD   Authorized by  Rema Goldman DPM           EMG LEFT LOWER EXTREMITY    Patient reports symptoms of pain in the bottom of the left foot with standing    Motor and sensory conduction studies were performed on the left peroneal, tibial and sural nerves. The distal motor latencies were normal. The motor action potential amplitudes were normal. Conduction velocities were normal including conduction of the peroneal nerve across the fibular head.    Left peroneal and tibial F waves were normal.    The left sural distal sensory latency was prolonged at 4.4 ms with normal sensory action potential amplitudes.    H. reflexes were symmetrically normal.    Concentric needle EMG was performed in various distal and proximal muscles of the left lower extremity including EDB, flexor hallucis brevis, tibialis anterior, gastrocnemius medius, vastus lateralis, biceps femoralis short head and the low lumbar paraspinal region.. There is no evidence of spontaneous activity seen. The compound motor unit potentials were of normal configuration and interference patterns were full or full for effort.    IMPRESSION: This is an abnormal EMG of the left lower extremity due to a prolonged sural latency to be suggestive of sensory neuropathy.    Juan Jose Carpio M.D.      [facility] [address] [address] [phone]    Electromyography & Nerve Conduction Studies Report          Full Name: JOHANA SCOTT Gender: Female  Patient ID: [id number] YOB: 1960      Visit Date: 12/28/2023 12:45 PM  Age: 63 Years  Examining Physician: DR. JUAN JOSE CARPIO  Referring Physician: DR. GOLDMAN  Technologist: PARKER  Temperature: 32  Height: 5 feet 8 inch      Sensory Nerve Conduction Study       Nerve / Sites Rec. Site Onset Lat Peak Lat  Amp Segments Distance Velocity     ms ms µV  cm m/s   L Sural -  (Antidromic).      Calf Ankle 4.4 6.0 8.9 Calf - Ankle 14 32      Ref.  ?3.3  ?6.0 Ref.  ?40       Motor Nerve Conduction Study       Nerve / Sites Muscle Latency Ref. Amplitude Ref. Segments Distance Lat Diff Velocity Ref.     ms ms mV mV  cm ms m/s m/s   L Peroneal - EDB      Ankle EDB 3.8 ?6.5 2.6 ?2.0 Ankle - EDB 9         B. Fib Head EDB 11.1  2.5  B. Fib Head - Ankle 32.5 7.31 44 ?44      A. Fib Head EDB 12.5  2.5  A. Fib Head - B. Fib Head 10 1.37 73 ?44   L Tibial - AH      Ankle AH 4.6 ?5.8 6.8 ?4.0 Ankle - AH 9         Knee AH 13.3  2.6  Knee - Ankle 37.5 8.75 43 ?41   L Peroneal - Tib Ant      Fib Head Tib Ant 3.0 ?6.7 1.6 ?3.0 Fib Head - Tib Ant          Pop fossa Tib Ant 4.9  1.2  Pop fossa - Fib Head 10 1.88 53 ?44       F Waves       Nerve F Latency M Latency F - M Lat    ms ms ms   L Peroneal - EDB 49.9 4.3 45.6   L Tibial - AH 53.4 4.9 48.5       H Reflex       Nerve H Latency    ms   L Tibial - Soleus 31.3   R Tibial - Soleus 30.8       EMG Summary Table     Spontaneous MUAP Recruitment   Muscle Nerve Roots IA Fib PSW Fasc H.F. Dur. Amp PPP Config Pattern   L. Tibialis anterior Peroneal L4-L5 NL None None None None NL NL None NL NL   L. Extensor digitorum brevis Tibial L5-S1 NL None None None None NL NL None NL NL   L. Flexor hallucis brevis Tibial S1-S2 NL None None None None NL NL None NL NL   L. Gastrocnemius (Medial head) Tibial S1-S2 NL None None None None NL NL None NL NL   L. Quadriceps Femoral L2-L4 NL None None None None NL NL None NL NL   L. Biceps femoris (short head) Sciatic (peroneal division) L5-S2 NL None None None None NL NL None NL NL   L. Lumbar paraspinals (low)  - NL None None None None NL NL None NL NL

## 2024-01-10 DIAGNOSIS — J30.89 ENVIRONMENTAL AND SEASONAL ALLERGIES: ICD-10-CM

## 2024-01-10 RX ORDER — DESLORATADINE 5 MG/1
TABLET ORAL
Qty: 90 TABLET | Refills: 1 | Status: SHIPPED | OUTPATIENT
Start: 2024-01-10

## 2024-01-12 ENCOUNTER — TELEPHONE (OUTPATIENT)
Age: 64
End: 2024-01-12

## 2024-01-12 DIAGNOSIS — U09.9 POST-COVID CHRONIC COUGH: Primary | ICD-10-CM

## 2024-01-12 DIAGNOSIS — R05.3 POST-COVID CHRONIC COUGH: Primary | ICD-10-CM

## 2024-01-12 RX ORDER — BUDESONIDE 90 UG/1
1 AEROSOL, POWDER RESPIRATORY (INHALATION) 2 TIMES DAILY
Qty: 1 EACH | Refills: 0 | Status: SHIPPED | OUTPATIENT
Start: 2024-01-12 | End: 2024-01-15

## 2024-01-12 NOTE — TELEPHONE ENCOUNTER
S/w pt, we think the issue is that Dr. Longo is retiring the end of March, so I told pt to put  on her ins card.

## 2024-01-12 NOTE — TELEPHONE ENCOUNTER
Pt was calling in following up from her last visit 12/20 stating that she still has a lingering coughing that wont go away and chest and head congestion. Pt stated that Dr. Goode told her to call in if the issue is still on going. Pt would like to know if Dr. Goode can call in medication or what her recommendation would be to get rid of this cough. Please advise with the pt in regards to this matter thank you.

## 2024-01-12 NOTE — TELEPHONE ENCOUNTER
Call from patient advising the inhaler that was called in will cost her $217 which she cannot afford at this time. She is asking for a more cost effective alternative inhaler to try. Please call to [facility] in [place]. Thank you

## 2024-01-12 NOTE — TELEPHONE ENCOUNTER
She can try a steroid based inhaler for the ongoing cough, sent to [facility]. Use for 2 weeks, call if no improvement. Sometimes coughs can linger for several weeks, even months after having covid. If she develops sob, wheezing, cp she needs to go to UC or ER for evaluation of pneumonia.

## 2024-01-12 NOTE — TELEPHONE ENCOUNTER
Please call patient back regarding if Dr Goode is still accepting [facility]. She states she spoke with insurance company and their notes show that she will no longer be accepting it

## 2024-01-15 ENCOUNTER — DOCUMENTATION (OUTPATIENT)
Dept: FAMILY MEDICINE CLINIC | Facility: MEDICAL CENTER | Age: 64
End: 2024-01-15

## 2024-01-15 DIAGNOSIS — R05.3 POST-COVID CHRONIC COUGH: Primary | ICD-10-CM

## 2024-01-15 DIAGNOSIS — U09.9 POST-COVID CHRONIC COUGH: Primary | ICD-10-CM

## 2024-01-15 RX ORDER — ALBUTEROL SULFATE 90 UG/1
2 AEROSOL, METERED RESPIRATORY (INHALATION) EVERY 6 HOURS PRN
Qty: 8.5 G | Refills: 0 | Status: SHIPPED | OUTPATIENT
Start: 2024-01-15

## 2024-01-29 ENCOUNTER — VBI (OUTPATIENT)
Dept: ADMINISTRATIVE | Facility: OTHER | Age: 64
End: 2024-01-29

## 2024-02-06 ENCOUNTER — RA CDI HCC (OUTPATIENT)
Dept: OTHER | Facility: HOSPITAL | Age: 64
End: 2024-02-06

## 2024-02-06 DIAGNOSIS — R05.3 POST-COVID CHRONIC COUGH: ICD-10-CM

## 2024-02-06 DIAGNOSIS — U09.9 POST-COVID CHRONIC COUGH: ICD-10-CM

## 2024-02-06 RX ORDER — ALBUTEROL SULFATE 90 UG/1
2 AEROSOL, METERED RESPIRATORY (INHALATION) EVERY 6 HOURS PRN
Qty: 18 G | Refills: 1 | Status: SHIPPED | OUTPATIENT
Start: 2024-02-06 | End: 2024-02-15

## 2024-02-07 ENCOUNTER — APPOINTMENT (OUTPATIENT)
Dept: LAB | Facility: HOSPITAL | Age: 64
End: 2024-02-07
Payer: COMMERCIAL

## 2024-02-07 DIAGNOSIS — K03.89: ICD-10-CM

## 2024-02-07 DIAGNOSIS — L60.3 BRITTLE NAILS: ICD-10-CM

## 2024-02-07 DIAGNOSIS — E11.9 TYPE 2 DIABETES MELLITUS WITHOUT COMPLICATION, WITHOUT LONG-TERM CURRENT USE OF INSULIN (HCC): ICD-10-CM

## 2024-02-07 LAB
ALBUMIN SERPL BCP-MCNC: 4.4 G/DL (ref 3.5–5)
ALP SERPL-CCNC: 104 U/L (ref 34–104)
ALT SERPL W P-5'-P-CCNC: 21 U/L (ref 7–52)
ANION GAP SERPL CALCULATED.3IONS-SCNC: 7 MMOL/L
AST SERPL W P-5'-P-CCNC: 26 U/L (ref 13–39)
BASOPHILS # BLD AUTO: 0.06 THOUSANDS/ÂΜL (ref 0–0.1)
BASOPHILS NFR BLD AUTO: 1 % (ref 0–1)
BILIRUB SERPL-MCNC: 0.47 MG/DL (ref 0.2–1)
BUN SERPL-MCNC: 16 MG/DL (ref 5–25)
CALCIUM SERPL-MCNC: 9.5 MG/DL (ref 8.4–10.2)
CHLORIDE SERPL-SCNC: 104 MMOL/L (ref 96–108)
CO2 SERPL-SCNC: 29 MMOL/L (ref 21–32)
CREAT SERPL-MCNC: 0.71 MG/DL (ref 0.6–1.3)
EOSINOPHIL # BLD AUTO: 0.44 THOUSAND/ÂΜL (ref 0–0.61)
EOSINOPHIL NFR BLD AUTO: 5 % (ref 0–6)
ERYTHROCYTE [DISTWIDTH] IN BLOOD BY AUTOMATED COUNT: 14 % (ref 11.6–15.1)
GFR SERPL CREATININE-BSD FRML MDRD: 92 ML/MIN/1.73SQ M
GLUCOSE P FAST SERPL-MCNC: 129 MG/DL (ref 65–99)
HCT VFR BLD AUTO: 41.8 % (ref 34.8–46.1)
HGB BLD-MCNC: 13.3 G/DL (ref 11.5–15.4)
IMM GRANULOCYTES # BLD AUTO: 0.05 THOUSAND/UL (ref 0–0.2)
IMM GRANULOCYTES NFR BLD AUTO: 1 % (ref 0–2)
LYMPHOCYTES # BLD AUTO: 2.15 THOUSANDS/ÂΜL (ref 0.6–4.47)
LYMPHOCYTES NFR BLD AUTO: 22 % (ref 14–44)
MCH RBC QN AUTO: 29.6 PG (ref 26.8–34.3)
MCHC RBC AUTO-ENTMCNC: 31.8 G/DL (ref 31.4–37.4)
MCV RBC AUTO: 93 FL (ref 82–98)
MONOCYTES # BLD AUTO: 0.62 THOUSAND/ÂΜL (ref 0.17–1.22)
MONOCYTES NFR BLD AUTO: 6 % (ref 4–12)
NEUTROPHILS # BLD AUTO: 6.36 THOUSANDS/ÂΜL (ref 1.85–7.62)
NEUTS SEG NFR BLD AUTO: 65 % (ref 43–75)
NRBC BLD AUTO-RTO: 0 /100 WBCS
PLATELET # BLD AUTO: 335 THOUSANDS/UL (ref 149–390)
PMV BLD AUTO: 10.7 FL (ref 8.9–12.7)
POTASSIUM SERPL-SCNC: 4.7 MMOL/L (ref 3.5–5.3)
PROT SERPL-MCNC: 8.2 G/DL (ref 6.4–8.4)
RBC # BLD AUTO: 4.5 MILLION/UL (ref 3.81–5.12)
SODIUM SERPL-SCNC: 140 MMOL/L (ref 135–147)
TSH SERPL DL<=0.05 MIU/L-ACNC: 0.9 UIU/ML (ref 0.45–4.5)
WBC # BLD AUTO: 9.68 THOUSAND/UL (ref 4.31–10.16)

## 2024-02-07 PROCEDURE — 36415 COLL VENOUS BLD VENIPUNCTURE: CPT

## 2024-02-07 PROCEDURE — 80053 COMPREHEN METABOLIC PANEL: CPT

## 2024-02-07 PROCEDURE — 84443 ASSAY THYROID STIM HORMONE: CPT

## 2024-02-07 PROCEDURE — 85025 COMPLETE CBC W/AUTO DIFF WBC: CPT

## 2024-02-12 ENCOUNTER — TELEPHONE (OUTPATIENT)
Age: 64
End: 2024-02-12

## 2024-02-15 ENCOUNTER — OFFICE VISIT (OUTPATIENT)
Dept: FAMILY MEDICINE CLINIC | Facility: MEDICAL CENTER | Age: 64
End: 2024-02-15
Payer: COMMERCIAL

## 2024-02-15 ENCOUNTER — NURSE TRIAGE (OUTPATIENT)
Dept: OTHER | Facility: OTHER | Age: 64
End: 2024-02-15

## 2024-02-15 ENCOUNTER — TELEPHONE (OUTPATIENT)
Age: 64
End: 2024-02-15

## 2024-02-15 ENCOUNTER — APPOINTMENT (OUTPATIENT)
Dept: LAB | Facility: MEDICAL CENTER | Age: 64
End: 2024-02-15
Payer: COMMERCIAL

## 2024-02-15 VITALS
SYSTOLIC BLOOD PRESSURE: 132 MMHG | DIASTOLIC BLOOD PRESSURE: 80 MMHG | HEIGHT: 68 IN | OXYGEN SATURATION: 97 % | TEMPERATURE: 97.5 F | WEIGHT: 293 LBS | HEART RATE: 98 BPM | BODY MASS INDEX: 44.41 KG/M2 | RESPIRATION RATE: 18 BRPM

## 2024-02-15 DIAGNOSIS — E11.9 TYPE 2 DIABETES MELLITUS WITHOUT COMPLICATION, WITHOUT LONG-TERM CURRENT USE OF INSULIN (HCC): ICD-10-CM

## 2024-02-15 DIAGNOSIS — E66.01 MORBID OBESITY (HCC): ICD-10-CM

## 2024-02-15 DIAGNOSIS — Z23 ENCOUNTER FOR IMMUNIZATION: ICD-10-CM

## 2024-02-15 DIAGNOSIS — E11.9 TYPE 2 DIABETES MELLITUS WITHOUT COMPLICATION, WITHOUT LONG-TERM CURRENT USE OF INSULIN (HCC): Primary | ICD-10-CM

## 2024-02-15 DIAGNOSIS — B34.9 VIRAL ILLNESS: ICD-10-CM

## 2024-02-15 DIAGNOSIS — I10 PRIMARY HYPERTENSION: ICD-10-CM

## 2024-02-15 LAB
EST. AVERAGE GLUCOSE BLD GHB EST-MCNC: 137 MG/DL
HBA1C MFR BLD: 6.4 %

## 2024-02-15 PROCEDURE — 36415 COLL VENOUS BLD VENIPUNCTURE: CPT

## 2024-02-15 PROCEDURE — 83036 HEMOGLOBIN GLYCOSYLATED A1C: CPT

## 2024-02-15 PROCEDURE — G0009 ADMIN PNEUMOCOCCAL VACCINE: HCPCS

## 2024-02-15 PROCEDURE — 90677 PCV20 VACCINE IM: CPT

## 2024-02-15 PROCEDURE — 99214 OFFICE O/P EST MOD 30 MIN: CPT

## 2024-02-15 RX ORDER — GABAPENTIN 300 MG/1
300 CAPSULE ORAL 3 TIMES DAILY
COMMUNITY
Start: 2024-01-22

## 2024-02-15 NOTE — PROGRESS NOTES
Assessment/Plan:    A1c to be done at the lab, order provided.   Return for follow up in 3 months, sooner if needed.  Mammogram refused.   GYN/pap referral refused.  All recommended vaccinations declined.  Advised to make appointment with eye doctor for diabetic eye exam.  Colonoscopy utd.  Advised to check with insurance company regarding coverage of Ozempic or Mounjaro. Referral to  declined.     1. Type 2 diabetes mellitus without complication, without long-term current use of insulin (HCC)  Unable to obtain point-of-care A1c in office as machine is currently not functioning properly.  Check A1c at lab, order provided.  Continue metformin.  Patient interested in Mounjaro or Ozempic, advised patient to check with insurance company regarding coverage of these medications first.  - Hemoglobin A1C; Future    2. Primary hypertension  Stable with current management.  Continue amlodipine and benazepril.    3. Morbid obesity (HCC)  Referral to weight management declined.  Advised about healthy diet, regular exercise.  Patient interested in Mounjaro or Ozempic, advised patient to check with insurance company regarding coverage of these medications first.    4. Encounter for immunization  - Pneumococcal Conjugate Vaccine 20-valent (Pcv20)      5.  Viral illness  Advised to use Coricidin as needed for symptom relief.  Use Flonase once daily.  Call if symptoms worsen or fail to improve.      Subjective:      Patient ID: Mili Jose is a 63 y.o. female.    63-year-old female presents for 3-month follow-up.  She has hypertension, currently on amlodipine, benazepril.  She has diabetes mellitus type 2, currently on metformin.   She is interested in possibly starting Mounjaro or Ozempic. She reports family members take Ozempic and have good results with this. Declines referral to weight management, she does not think this will be beneficial for her and she also has a lot going on as she takes care of her sick brother  "therefore limiting her time and availability to attend additional doctors visits.  She is complaining of congestion, cough, post nasal drip and sore throat x 2 days. She has been around 2 sick children recently. Did not test herself for covid. She is not taking any medications for her symptoms.           The following portions of the patient's history were reviewed and updated as appropriate: allergies, current medications, past medical history, past social history, past surgical history, and problem list.    Review of Systems   Constitutional: Negative.    HENT:  Positive for congestion, postnasal drip and sore throat.    Eyes: Negative.    Respiratory:  Positive for cough.    Cardiovascular: Negative.    Gastrointestinal: Negative.    Endocrine: Negative.    Genitourinary: Negative.    Musculoskeletal: Negative.    Skin: Negative.    Neurological: Negative.    Hematological: Negative.    Psychiatric/Behavioral: Negative.           Objective:      /80   Pulse 98   Temp 97.5 °F (36.4 °C)   Resp 18   Ht 5' 8\" (1.727 m)   Wt (!) 138 kg (303 lb 3.2 oz)   SpO2 97%   BMI 46.10 kg/m²          Physical Exam  Vitals and nursing note reviewed.   Constitutional:       General: She is not in acute distress.     Appearance: Normal appearance. She is obese. She is not ill-appearing.   HENT:      Head: Normocephalic and atraumatic.      Right Ear: Tympanic membrane, ear canal and external ear normal.      Left Ear: Tympanic membrane, ear canal and external ear normal.      Nose: Nose normal.      Mouth/Throat:      Mouth: Mucous membranes are moist.      Pharynx: Oropharynx is clear. No oropharyngeal exudate.   Eyes:      Conjunctiva/sclera: Conjunctivae normal.   Cardiovascular:      Rate and Rhythm: Normal rate and regular rhythm.      Pulses: Normal pulses.      Heart sounds: Normal heart sounds.   Pulmonary:      Effort: Pulmonary effort is normal. No respiratory distress.      Breath sounds: Normal breath " sounds. No stridor. No wheezing, rhonchi or rales.   Skin:     General: Skin is warm and dry.   Neurological:      General: No focal deficit present.      Mental Status: She is alert and oriented to person, place, and time. Mental status is at baseline.   Psychiatric:         Mood and Affect: Mood normal.         Behavior: Behavior normal.         Thought Content: Thought content normal.                    RAHEL Kim

## 2024-02-15 NOTE — TELEPHONE ENCOUNTER
Ozempic sent to pharmacy. Use as directed. Start with 0.25 mg injection SQ once weekly x 4 weeks then increase to 0.5 mg injection SQ once weekly. Use this dose until follow up in office as scheduled. We will discuss increasing dose at that time if needed. This along with healthy diet, limit sugar and carb intake and regular exercise. I will see her for follow up in 3 months as scheduled, sooner if needed. She should ensure proper use of this medication and injection site instructions with pharmacy when picking it up.

## 2024-02-15 NOTE — TELEPHONE ENCOUNTER
Patient stated that she hasn't eaten since this morning. Patient was advised to to eat light, easy to digest meal, to call back if symptoms become worse. Patient agreeable.

## 2024-02-15 NOTE — TELEPHONE ENCOUNTER
"Reason for Disposition  • Immunization reactions, questions about    Answer Assessment - Initial Assessment Questions  1. SYMPTOMS: \"What is the main symptom?\" (e.g., redness, swelling, pain)       Nausea, pain at the site of the vaccine   2. ONSET: \"When was the vaccine (shot) given?\"        This morning.    \"How much later did the nausea  begin?\" (e.g., hours, days ago)       30 minutes ago.   3. SEVERITY: \"How bad is it?\"       Moderate   4. FEVER: \"Is there a fever?\" If Yes, ask: \"What is it, how was it measured, and when did it start?\"       No   5. IMMUNIZATIONS GIVEN: \"What shots have you recently received?\"      Pneumonia   6. PAST REACTIONS: \"Have you reacted to immunizations before?\" If Yes, ask: \"What happened?\"      No   7. OTHER SYMPTOMS: \"Do you have any other symptoms?\"      Pain at the vaccine site.    Protocols used: Immunization Reactions-ADULT-[place]    "

## 2024-02-15 NOTE — TELEPHONE ENCOUNTER
Patient called in stating she spoke with [facility] pharmacy in [facility] and found out the ozempic is covered. Patient would like to know if that can be ordered now or if she has to wait until she goes for labs. Please advise.

## 2024-02-20 ENCOUNTER — OFFICE VISIT (OUTPATIENT)
Dept: FAMILY MEDICINE CLINIC | Facility: MEDICAL CENTER | Age: 64
End: 2024-02-20
Payer: COMMERCIAL

## 2024-02-20 VITALS
RESPIRATION RATE: 18 BRPM | SYSTOLIC BLOOD PRESSURE: 138 MMHG | TEMPERATURE: 97.9 F | WEIGHT: 293 LBS | HEART RATE: 104 BPM | DIASTOLIC BLOOD PRESSURE: 88 MMHG | BODY MASS INDEX: 46.22 KG/M2 | OXYGEN SATURATION: 96 %

## 2024-02-20 DIAGNOSIS — E11.9 TYPE 2 DIABETES MELLITUS WITHOUT COMPLICATION, WITHOUT LONG-TERM CURRENT USE OF INSULIN (HCC): Primary | ICD-10-CM

## 2024-02-20 PROCEDURE — 99214 OFFICE O/P EST MOD 30 MIN: CPT

## 2024-02-20 NOTE — PROGRESS NOTES
Assessment/Plan:    Administration of first dose of Ozempic performed and taught to patient in office today.  Return for follow up in 3 months as scheduled.  Call sooner if any questions or concerns.      1. Type 2 diabetes mellitus without complication, without long-term current use of insulin (HCC)  Administration of Ozempic first dose performed in office today with patient.  Instructions and demonstration on administration of this medication, storage of the medication discussed and performed in office today.  Patient will now inject once weekly every Tuesday and increase dose after 4 weeks as discussed.   Advised to wash hands and clean with alcohol prior to administration of the medication.  Also advised to alternate injection sites as discussed.    Patient demonstrated evidence of learning and all questions were answered.     Subjective:      Patient ID: Mili Jose is a 63 y.o. female.    63-year-old female presents to help with administration of first dose of Ozempic.  She recently was started on this medication, just picked it up from the pharmacy and was advised by the pharmacist to come to her PCP office for instructions on administration of this medication. She has the medication and needles with her today. She also tells me she has her own alcohol pads and sharps container at home.  She has no other concerns or complaints at this time.        The following portions of the patient's history were reviewed and updated as appropriate: allergies, current medications, past medical history, past social history, past surgical history, and problem list.    Review of Systems   Constitutional: Negative.    HENT: Negative.     Eyes: Negative.    Respiratory: Negative.     Cardiovascular: Negative.    Gastrointestinal: Negative.    Endocrine: Negative.    Genitourinary: Negative.    Musculoskeletal: Negative.    Skin: Negative.    Allergic/Immunologic: Negative.    Neurological: Negative.    Hematological:  Negative.    Psychiatric/Behavioral: Negative.           Objective:      /88 (BP Location: Left arm, Patient Position: Sitting, Cuff Size: Standard)   Pulse 104   Temp 97.9 °F (36.6 °C) (Temporal)   Resp 18   Wt (!) 138 kg (304 lb)   SpO2 96%   BMI 46.22 kg/m²          Physical Exam  Constitutional:       General: She is not in acute distress.     Appearance: Normal appearance. She is obese. She is not ill-appearing.   HENT:      Head: Normocephalic and atraumatic.   Neurological:      General: No focal deficit present.      Mental Status: She is alert and oriented to person, place, and time. Mental status is at baseline.   Psychiatric:         Mood and Affect: Mood normal.         Behavior: Behavior normal.         Thought Content: Thought content normal.                    RAHEL Kim

## 2024-02-28 ENCOUNTER — OFFICE VISIT (OUTPATIENT)
Dept: FAMILY MEDICINE CLINIC | Facility: MEDICAL CENTER | Age: 64
End: 2024-02-28
Payer: COMMERCIAL

## 2024-02-28 VITALS
RESPIRATION RATE: 96 BRPM | TEMPERATURE: 97.9 F | WEIGHT: 293 LBS | HEART RATE: 95 BPM | SYSTOLIC BLOOD PRESSURE: 124 MMHG | DIASTOLIC BLOOD PRESSURE: 80 MMHG | BODY MASS INDEX: 46.07 KG/M2

## 2024-02-28 DIAGNOSIS — J06.9 UPPER RESPIRATORY TRACT INFECTION, UNSPECIFIED TYPE: Primary | ICD-10-CM

## 2024-02-28 DIAGNOSIS — R05.1 ACUTE COUGH: ICD-10-CM

## 2024-02-28 PROCEDURE — 99213 OFFICE O/P EST LOW 20 MIN: CPT

## 2024-02-28 RX ORDER — AZITHROMYCIN 250 MG/1
TABLET, FILM COATED ORAL DAILY
Qty: 6 TABLET | Refills: 0 | Status: SHIPPED | OUTPATIENT
Start: 2024-02-28 | End: 2024-03-04

## 2024-02-28 RX ORDER — DEXTROMETHORPHAN HYDROBROMIDE AND PROMETHAZINE HYDROCHLORIDE 15; 6.25 MG/5ML; MG/5ML
5 SYRUP ORAL
Qty: 118 ML | Refills: 0 | Status: SHIPPED | OUTPATIENT
Start: 2024-02-28

## 2024-02-28 NOTE — PROGRESS NOTES
Assessment/Plan:    Z-Parth sent to pharmacy.  Promethazine cough syrup sent to pharmacy.  Advised to continue Flonase once daily.  Return if symptoms worsen or fail to improve.     1. Upper respiratory tract infection, unspecified type  Take antibiotic as directed until course is complete.  Continue Flonase once daily.  Use promethazine cough syrup at bedtime as needed.  Sleep with head of bed propped around cool-mist humidifier at bedtime.  Call the office if symptoms worsen or fail to improve.  - azithromycin (Zithromax) 250 mg tablet; Take 2 tablets (500 mg total) by mouth daily for 1 day, THEN 1 tablet (250 mg total) daily for 4 days.  Dispense: 6 tablet; Refill: 0    2. Acute cough  Take antibiotic as directed until course is complete.  Continue Flonase once daily.  Use promethazine cough syrup at bedtime as needed.  Sleep with head of bed propped around cool-mist humidifier at bedtime.  Call the office if symptoms worsen or fail to improve.  - promethazine-dextromethorphan (PHENERGAN-DM) 6.25-15 mg/5 mL oral syrup; Take 5 mL by mouth daily at bedtime as needed for cough  Dispense: 118 mL; Refill: 0      Subjective:      Patient ID: Mili Jose is a 63 y.o. female.    63-year-old female presents with complaints of cough, congestion, body aches, post nasal drip, sore throat, chills x 2 weeks.   She was complaining of similar symptoms at her 2/15 office visit that were ongoing for 2 days at that time with the exception of the body aches and chills.  She had reported being around some sick children and not testing herself for COVID.  She was also not taking any medications at that time.  Recommendation was to start using Coricidin and Flonase which she reports she has been using daily without much relief.         The following portions of the patient's history were reviewed and updated as appropriate: allergies, past family history, past medical history, past social history, past surgical history, and problem  list.    Review of Systems   Constitutional:  Positive for chills. Negative for fever.   HENT:  Positive for congestion, postnasal drip and sore throat.    Eyes: Negative.    Respiratory:  Positive for cough.    Cardiovascular: Negative.    Gastrointestinal: Negative.    Endocrine: Negative.    Genitourinary: Negative.    Musculoskeletal:  Positive for myalgias.   Skin: Negative.    Allergic/Immunologic: Negative.    Neurological: Negative.    Hematological: Negative.    Psychiatric/Behavioral: Negative.           Objective:      /80 (BP Location: Right arm, Patient Position: Sitting, Cuff Size: Extra-Large)   Pulse 95   Temp 97.9 °F (36.6 °C) (Tympanic)   Resp (!) 96   Wt (!) 137 kg (303 lb)   BMI 46.07 kg/m²          Physical Exam  Vitals and nursing note reviewed.   Constitutional:       General: She is not in acute distress.     Appearance: Normal appearance. She is obese. She is ill-appearing.   HENT:      Head: Normocephalic and atraumatic.      Right Ear: Tympanic membrane and ear canal normal.      Left Ear: Tympanic membrane and ear canal normal.      Nose: Congestion present.      Right Sinus: No maxillary sinus tenderness or frontal sinus tenderness.      Left Sinus: No maxillary sinus tenderness or frontal sinus tenderness.      Mouth/Throat:      Mouth: Mucous membranes are moist.      Pharynx: Oropharynx is clear. Posterior oropharyngeal erythema present. No oropharyngeal exudate.   Eyes:      Conjunctiva/sclera: Conjunctivae normal.   Cardiovascular:      Rate and Rhythm: Normal rate and regular rhythm.      Pulses: Normal pulses.      Heart sounds: Normal heart sounds.   Pulmonary:      Effort: Pulmonary effort is normal. No respiratory distress.      Breath sounds: Normal breath sounds. No stridor. No wheezing, rhonchi or rales.   Musculoskeletal:         General: Normal range of motion.   Skin:     General: Skin is warm and dry.   Neurological:      General: No focal deficit present.       Mental Status: She is alert and oriented to person, place, and time. Mental status is at baseline.   Psychiatric:         Mood and Affect: Mood normal.         Behavior: Behavior normal.         Thought Content: Thought content normal.                    RAHEL Kim

## 2024-03-18 DIAGNOSIS — J30.89 ENVIRONMENTAL AND SEASONAL ALLERGIES: ICD-10-CM

## 2024-03-18 RX ORDER — FLUTICASONE PROPIONATE 50 MCG
SPRAY, SUSPENSION (ML) NASAL
Qty: 16 G | Refills: 1 | Status: SHIPPED | OUTPATIENT
Start: 2024-03-18

## 2024-03-26 NOTE — TELEPHONE ENCOUNTER
Called pt, no answer, left a detailed message on voicemail  From: Fuad Kendall  To: Renetta Hanks  Sent: 3/26/2024 6:16 AM CDT  Subject: Lyme disease    Good Morning,   I am currently experiencing the symptoms of Lyme disease once again. Minor symptoms have seem to have come and gone since I was tested positive, but currently the symptoms are significant. I have significant fatigue and pain in most of my joints. This has been very noticeable for the last week or more.  I can be reached any time on my cell via call or text @ [phone].  Thank you,  Mino Kendall

## 2024-04-05 DIAGNOSIS — J30.89 ENVIRONMENTAL AND SEASONAL ALLERGIES: ICD-10-CM

## 2024-04-05 RX ORDER — MONTELUKAST SODIUM 10 MG/1
TABLET ORAL
Qty: 90 TABLET | Refills: 1 | Status: SHIPPED | OUTPATIENT
Start: 2024-04-05

## 2024-04-12 ENCOUNTER — TELEPHONE (OUTPATIENT)
Dept: FAMILY MEDICINE CLINIC | Facility: MEDICAL CENTER | Age: 64
End: 2024-04-12

## 2024-05-08 DIAGNOSIS — J30.89 ENVIRONMENTAL AND SEASONAL ALLERGIES: ICD-10-CM

## 2024-05-08 RX ORDER — FLUTICASONE PROPIONATE 50 MCG
SPRAY, SUSPENSION (ML) NASAL
Qty: 16 G | Refills: 1 | Status: SHIPPED | OUTPATIENT
Start: 2024-05-08

## 2024-05-22 ENCOUNTER — TELEPHONE (OUTPATIENT)
Age: 64
End: 2024-05-22

## 2024-05-22 NOTE — TELEPHONE ENCOUNTER
Patient is calling for clarification on Ozempic dosing. She was taking 0.25 mg for 28 days, then 0.5 mg. She wants to verify is she should be taking 0.5 mg, or starting back on the 0.25.   Please follow up with patient.

## 2024-05-23 DIAGNOSIS — K21.9 GASTROESOPHAGEAL REFLUX DISEASE WITHOUT ESOPHAGITIS: ICD-10-CM

## 2024-05-24 RX ORDER — OMEPRAZOLE 20 MG/1
CAPSULE, DELAYED RELEASE ORAL
Qty: 180 CAPSULE | Refills: 1 | Status: SHIPPED | OUTPATIENT
Start: 2024-05-24

## 2024-05-24 NOTE — TELEPHONE ENCOUNTER
Patient calling regarding dosing again. She states she did not hear back. Please follow up with patient regarding ozempic dosing.

## 2024-05-24 NOTE — TELEPHONE ENCOUNTER
My apologies, I never got the message until now which is why she did not hear response from me regarding this.  She should not go back down to lower dose, she should stay on the 0.5 mg dosing until I see her for follow up as scheduled in 2 weeks. Does she need a refill prior to then?

## 2024-06-04 ENCOUNTER — OFFICE VISIT (OUTPATIENT)
Dept: FAMILY MEDICINE CLINIC | Facility: MEDICAL CENTER | Age: 64
End: 2024-06-04
Payer: COMMERCIAL

## 2024-06-04 ENCOUNTER — APPOINTMENT (OUTPATIENT)
Dept: LAB | Facility: MEDICAL CENTER | Age: 64
End: 2024-06-04
Payer: COMMERCIAL

## 2024-06-04 VITALS
HEART RATE: 78 BPM | TEMPERATURE: 97.3 F | OXYGEN SATURATION: 96 % | BODY MASS INDEX: 44.55 KG/M2 | WEIGHT: 293 LBS | RESPIRATION RATE: 18 BRPM | DIASTOLIC BLOOD PRESSURE: 78 MMHG | SYSTOLIC BLOOD PRESSURE: 120 MMHG

## 2024-06-04 DIAGNOSIS — E11.9 TYPE 2 DIABETES MELLITUS WITHOUT COMPLICATION, WITHOUT LONG-TERM CURRENT USE OF INSULIN (HCC): Primary | ICD-10-CM

## 2024-06-04 DIAGNOSIS — I10 PRIMARY HYPERTENSION: ICD-10-CM

## 2024-06-04 DIAGNOSIS — E66.01 MORBID OBESITY (HCC): ICD-10-CM

## 2024-06-04 DIAGNOSIS — E11.9 TYPE 2 DIABETES MELLITUS WITHOUT COMPLICATION, WITHOUT LONG-TERM CURRENT USE OF INSULIN (HCC): ICD-10-CM

## 2024-06-04 LAB
ANION GAP SERPL CALCULATED.3IONS-SCNC: 12 MMOL/L (ref 4–13)
BUN SERPL-MCNC: 19 MG/DL (ref 5–25)
CALCIUM SERPL-MCNC: 9.6 MG/DL (ref 8.4–10.2)
CHLORIDE SERPL-SCNC: 101 MMOL/L (ref 96–108)
CO2 SERPL-SCNC: 25 MMOL/L (ref 21–32)
CREAT SERPL-MCNC: 0.78 MG/DL (ref 0.6–1.3)
CREAT UR-MCNC: 110 MG/DL
GFR SERPL CREATININE-BSD FRML MDRD: 82 ML/MIN/1.73SQ M
GLUCOSE SERPL-MCNC: 97 MG/DL (ref 65–140)
LEFT EYE DIABETIC RETINOPATHY: ABNORMAL
LEFT EYE IMAGE QUALITY: ABNORMAL
LEFT EYE MACULAR EDEMA: ABNORMAL
LEFT EYE OTHER RETINOPATHY: ABNORMAL
MICROALBUMIN UR-MCNC: <7 MG/L
MICROALBUMIN/CREAT 24H UR: <6 MG/G CREATININE (ref 0–30)
POTASSIUM SERPL-SCNC: 4.5 MMOL/L (ref 3.5–5.3)
RIGHT EYE DIABETIC RETINOPATHY: ABNORMAL
RIGHT EYE IMAGE QUALITY: ABNORMAL
RIGHT EYE MACULAR EDEMA: ABNORMAL
RIGHT EYE OTHER RETINOPATHY: ABNORMAL
SEVERITY (EYE EXAM): ABNORMAL
SL AMB POCT HEMOGLOBIN AIC: 5.9 (ref ?–6.5)
SODIUM SERPL-SCNC: 138 MMOL/L (ref 135–147)

## 2024-06-04 PROCEDURE — 99214 OFFICE O/P EST MOD 30 MIN: CPT

## 2024-06-04 PROCEDURE — 82043 UR ALBUMIN QUANTITATIVE: CPT

## 2024-06-04 PROCEDURE — 83036 HEMOGLOBIN GLYCOSYLATED A1C: CPT

## 2024-06-04 PROCEDURE — 36415 COLL VENOUS BLD VENIPUNCTURE: CPT

## 2024-06-04 PROCEDURE — 82570 ASSAY OF URINE CREATININE: CPT

## 2024-06-04 PROCEDURE — 80048 BASIC METABOLIC PNL TOTAL CA: CPT

## 2024-06-04 NOTE — PROGRESS NOTES
Ambulatory Visit  Name: Mili Jose      : 1960      MRN: [id number]  Encounter Provider: RAHEL Kim  Encounter Date: 2024   Encounter department: [facility]    Assessment & Plan   Cervical cancer screening refused.  Mammogram refused.  All recommended vaccinations declined.  Diabetic foot exam performed in office today.  Diabetic iris exam performed in office today.  BMP due now.  Fasting lab work due in 3 months prior to next visit.  1. Type 2 diabetes mellitus without complication, without long-term current use of insulin (HCC)  -     IRIS Diabetic eye exam  -     Albumin / creatinine urine ratio; Future  -     POCT hemoglobin A1c  -     semaglutide, 0.25 or 0.5 mg/dose, (Ozempic, 0.25 or 0.5 MG/DOSE,) 2 mg/3 mL injection pen; Inject 0.75 mL (0.5 mg total) under the skin every 7 days  -     Hemoglobin A1C; Future; Expected date: 2024  -     Comprehensive metabolic panel; Future; Expected date: 2024  -     Lipid panel; Future; Expected date: 2024  -     Basic metabolic panel; Future  -     Albumin / creatinine urine ratio  2. Primary hypertension  -     Comprehensive metabolic panel; Future; Expected date: 2024  3. Morbid obesity (HCC)      Depression Screening and Follow-up Plan: Patient was screened for depression during today's encounter. They screened negative with a PHQ-2 score of 0.      History of Present Illness   {Disappearing Hyperlinks I Encounters * My Last Note * Since Last Visit * History :35740}  63-year-old female presents for follow-up.  She has diabetes mellitus type 2, currently on metformin and Ozempic.  She is still using 0.5 mg dose weekly, she has lost 10 lbs since last visit in February when starting Ozempic and A1c is improved today since prior, down to 5.9% today. She has hypertension, currently on amlodipine and benazepril.  She is tolerating all of her medications well, denies any adverse side effects.  She  reports diet is well-balanced overall.  No concerns or complaints at this time.             Review of Systems   Constitutional: Negative.    HENT: Negative.     Eyes: Negative.    Respiratory: Negative.     Cardiovascular: Negative.    Gastrointestinal: Negative.    Endocrine: Negative.    Genitourinary: Negative.    Musculoskeletal: Negative.    Skin: Negative.    Allergic/Immunologic: Negative.    Neurological: Negative.    Hematological: Negative.    Psychiatric/Behavioral: Negative.       Objective   {Disappearing Hyperlinks   Review Vitals * Enter New Vitals * Results Review * Labs * Imaging * Cardiology * Procedures * Lung Cancer Screening :38456}  /78 (BP Location: Left arm, Patient Position: Sitting, Cuff Size: Large)   Pulse 78   Temp (!) 97.3 °F (36.3 °C) (Temporal)   Resp 18   Wt 133 kg (293 lb)   SpO2 96%   BMI 44.55 kg/m²     Physical Exam  Vitals and nursing note reviewed.   Constitutional:       General: She is not in acute distress.     Appearance: Normal appearance. She is obese. She is not ill-appearing.   HENT:      Head: Normocephalic and atraumatic.   Eyes:      Conjunctiva/sclera: Conjunctivae normal.   Cardiovascular:      Rate and Rhythm: Normal rate and regular rhythm.      Pulses: Normal pulses.      Heart sounds: Normal heart sounds.   Pulmonary:      Effort: Pulmonary effort is normal.      Breath sounds: Normal breath sounds.   Neurological:      General: No focal deficit present.      Mental Status: She is alert and oriented to person, place, and time. Mental status is at baseline.   Psychiatric:         Mood and Affect: Mood normal.         Behavior: Behavior normal.         Thought Content: Thought content normal.       Administrative Statements {Disappearing Hyperlinks I  [facility] * [facility]  I have spent a total time of 30 minutes on 06/04/24 In caring for this patient including Diagnostic results, Instructions for management, Patient and family  education, Importance of tx compliance, Impressions, Counseling / Coordination of care, Documenting in the medical record, Reviewing / ordering tests, medicine, procedures  , and Obtaining or reviewing history  .

## 2024-06-04 NOTE — PROGRESS NOTES
Diabetic Foot Exam    Patient's shoes and socks removed.    Right Foot/Ankle   Right Foot Inspection  Skin Exam: skin normal and skin intact. No dry skin, no warmth, no callus, no erythema, no maceration, no abnormal color, no pre-ulcer, no ulcer and no callus.     Toe Exam: ROM and strength within normal limits.     Sensory   Monofilament testing: intact    Vascular  Capillary refills: < 3 seconds  The right DP pulse is 1+. The right PT pulse is 1+.     Left Foot/Ankle  Left Foot Inspection  Skin Exam: skin normal and skin intact. No dry skin, no warmth, no erythema, no maceration, normal color, no pre-ulcer, no ulcer and no callus.     Toe Exam: ROM and strength within normal limits.     Sensory   Monofilament testing: intact    Vascular  Capillary refills: < 3 seconds  The left DP pulse is 1+. The left PT pulse is 1+.     Assign Risk Category  No deformity present  No loss of protective sensation  No weak pulses  Risk: 0

## 2024-06-06 DIAGNOSIS — J30.89 ENVIRONMENTAL AND SEASONAL ALLERGIES: ICD-10-CM

## 2024-06-06 RX ORDER — DESLORATADINE 5 MG/1
TABLET ORAL
Qty: 90 TABLET | Refills: 1 | Status: SHIPPED | OUTPATIENT
Start: 2024-06-06

## 2024-08-18 DIAGNOSIS — I10 ESSENTIAL HYPERTENSION: ICD-10-CM

## 2024-08-18 RX ORDER — AMLODIPINE BESYLATE 10 MG/1
TABLET ORAL
Qty: 90 TABLET | Refills: 1 | Status: SHIPPED | OUTPATIENT
Start: 2024-08-18

## 2024-08-20 ENCOUNTER — VBI (OUTPATIENT)
Dept: ADMINISTRATIVE | Facility: OTHER | Age: 64
End: 2024-08-20

## 2024-08-20 NOTE — TELEPHONE ENCOUNTER
08/20/24 12:15 PM     Chart reviewed for Hemoglobin A1c was/were submitted to the patient's insurance.     Leticia Perez   [facility]

## 2024-09-03 DIAGNOSIS — J30.89 ENVIRONMENTAL AND SEASONAL ALLERGIES: ICD-10-CM

## 2024-09-04 RX ORDER — MONTELUKAST SODIUM 10 MG/1
TABLET ORAL
Qty: 90 TABLET | Refills: 1 | Status: SHIPPED | OUTPATIENT
Start: 2024-09-04

## 2024-09-06 ENCOUNTER — TELEPHONE (OUTPATIENT)
Age: 64
End: 2024-09-06

## 2024-09-06 NOTE — TELEPHONE ENCOUNTER
Pt called to make appt for PPD placement.  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  She is coming down for other bloodwork on Monday 9/9/24 and requested to have appt scheduled for that day.  Appt scheduled for Monday morning, but pt needs to have order placed.  Please place order for PPD.  Thank you!

## 2024-09-17 ENCOUNTER — APPOINTMENT (OUTPATIENT)
Dept: LAB | Facility: MEDICAL CENTER | Age: 64
End: 2024-09-17
Payer: COMMERCIAL

## 2024-09-17 DIAGNOSIS — E11.9 TYPE 2 DIABETES MELLITUS WITHOUT COMPLICATION, WITHOUT LONG-TERM CURRENT USE OF INSULIN (HCC): ICD-10-CM

## 2024-09-17 DIAGNOSIS — I10 PRIMARY HYPERTENSION: ICD-10-CM

## 2024-09-17 LAB
ALBUMIN SERPL BCG-MCNC: 4.3 G/DL (ref 3.5–5)
ALP SERPL-CCNC: 105 U/L (ref 34–104)
ALT SERPL W P-5'-P-CCNC: 18 U/L (ref 7–52)
ANION GAP SERPL CALCULATED.3IONS-SCNC: 7 MMOL/L (ref 4–13)
AST SERPL W P-5'-P-CCNC: 20 U/L (ref 13–39)
BILIRUB SERPL-MCNC: 0.44 MG/DL (ref 0.2–1)
BUN SERPL-MCNC: 17 MG/DL (ref 5–25)
CALCIUM SERPL-MCNC: 9.2 MG/DL (ref 8.4–10.2)
CHLORIDE SERPL-SCNC: 104 MMOL/L (ref 96–108)
CHOLEST SERPL-MCNC: 120 MG/DL
CO2 SERPL-SCNC: 27 MMOL/L (ref 21–32)
CREAT SERPL-MCNC: 0.83 MG/DL (ref 0.6–1.3)
EST. AVERAGE GLUCOSE BLD GHB EST-MCNC: 128 MG/DL
GFR SERPL CREATININE-BSD FRML MDRD: 76 ML/MIN/1.73SQ M
GLUCOSE P FAST SERPL-MCNC: 102 MG/DL (ref 65–99)
HBA1C MFR BLD: 6.1 %
HDLC SERPL-MCNC: 57 MG/DL
LDLC SERPL CALC-MCNC: 45 MG/DL (ref 0–100)
NONHDLC SERPL-MCNC: 63 MG/DL
POTASSIUM SERPL-SCNC: 4.4 MMOL/L (ref 3.5–5.3)
PROT SERPL-MCNC: 7.9 G/DL (ref 6.4–8.4)
SODIUM SERPL-SCNC: 138 MMOL/L (ref 135–147)
TRIGL SERPL-MCNC: 90 MG/DL

## 2024-09-17 PROCEDURE — 80053 COMPREHEN METABOLIC PANEL: CPT

## 2024-09-17 PROCEDURE — 80061 LIPID PANEL: CPT

## 2024-09-17 PROCEDURE — 36415 COLL VENOUS BLD VENIPUNCTURE: CPT

## 2024-09-17 PROCEDURE — 83036 HEMOGLOBIN GLYCOSYLATED A1C: CPT

## 2024-09-23 DIAGNOSIS — E11.9 TYPE 2 DIABETES MELLITUS WITHOUT COMPLICATION, WITHOUT LONG-TERM CURRENT USE OF INSULIN (HCC): ICD-10-CM

## 2024-09-24 ENCOUNTER — OFFICE VISIT (OUTPATIENT)
Dept: FAMILY MEDICINE CLINIC | Facility: MEDICAL CENTER | Age: 64
End: 2024-09-24
Payer: COMMERCIAL

## 2024-09-24 VITALS
OXYGEN SATURATION: 98 % | RESPIRATION RATE: 18 BRPM | WEIGHT: 285.2 LBS | DIASTOLIC BLOOD PRESSURE: 70 MMHG | HEART RATE: 100 BPM | SYSTOLIC BLOOD PRESSURE: 120 MMHG | HEIGHT: 68 IN | TEMPERATURE: 97.3 F | BODY MASS INDEX: 43.22 KG/M2

## 2024-09-24 DIAGNOSIS — Z23 FLU VACCINE NEED: ICD-10-CM

## 2024-09-24 DIAGNOSIS — R07.89 CHEST HEAVINESS: ICD-10-CM

## 2024-09-24 DIAGNOSIS — E11.9 TYPE 2 DIABETES MELLITUS WITHOUT COMPLICATION, WITHOUT LONG-TERM CURRENT USE OF INSULIN (HCC): Primary | ICD-10-CM

## 2024-09-24 DIAGNOSIS — E66.01 MORBID OBESITY (HCC): ICD-10-CM

## 2024-09-24 PROCEDURE — 99214 OFFICE O/P EST MOD 30 MIN: CPT

## 2024-09-24 PROCEDURE — 93000 ELECTROCARDIOGRAM COMPLETE: CPT

## 2024-09-24 PROCEDURE — G0008 ADMIN INFLUENZA VIRUS VAC: HCPCS

## 2024-09-24 PROCEDURE — 90673 RIV3 VACCINE NO PRESERV IM: CPT

## 2024-09-24 RX ORDER — PRAVASTATIN SODIUM 10 MG
10 TABLET ORAL
Qty: 90 TABLET | Refills: 1 | Status: SHIPPED | OUTPATIENT
Start: 2024-09-24

## 2024-09-24 RX ORDER — PRAVASTATIN SODIUM 10 MG
TABLET ORAL
Qty: 30 TABLET | Refills: 0 | OUTPATIENT
Start: 2024-09-24

## 2024-09-24 NOTE — PROGRESS NOTES
Diabetic Foot Exam    Patient's shoes and socks removed.    Right Foot/Ankle   Right Foot Inspection  Skin Exam: skin normal. Skin not intact, no dry skin, no warmth, no callus, no erythema, no maceration, no abnormal color, no pre-ulcer, no ulcer and no callus.     Toe Exam: ROM and strength within normal limits.     Sensory   Monofilament testing: intact    Vascular  Capillary refills: < 3 seconds  The right DP pulse is 2+. The right PT pulse is 2+.     Left Foot/Ankle  Left Foot Inspection  Skin Exam: skin normal. Skin not intact, no dry skin, no warmth, no erythema, no maceration, normal color, no pre-ulcer, no ulcer and no callus.     Toe Exam: ROM and strength within normal limits.     Sensory   Monofilament testing: intact    Vascular  Capillary refills: < 3 seconds  The left DP pulse is 2+. The left PT pulse is 2+.     Assign Risk Category  No deformity present  No loss of protective sensation  No weak pulses  Risk: 0

## 2024-09-24 NOTE — PROGRESS NOTES
Assessment/Plan:    Influenza vaccine updated today.  Declines mammogram.  Declines OB/GYN referral.  Declines COVID booster.  Diabetic foot exam performed in office today.  Advised to follow-up with cardiology.  Increase Ozempic as below.  Return for AWV and follow-up diabetes in 3 months, sooner if needed.     1. Type 2 diabetes mellitus without complication, without long-term current use of insulin (HCC)  A1c stable on most recent labs 6.1%  Continue metformin at current dose.  Increase Ozempic to 1 mg weekly.  Advised about dietary changes and regular exercise.  Return for follow-up in 3 months.  - semaglutide, 1 mg/dose, (Ozempic) 4 mg/3 mL injection pen; Inject 0.75 mL (1 mg total) under the skin once a week  Dispense: 9 mL; Refill: 1  - pravastatin (PRAVACHOL) 10 mg tablet; Take 1 tablet (10 mg total) by mouth daily at bedtime  Dispense: 90 tablet; Refill: 1    2. Morbid obesity (HCC)  Increase Ozempic to 1 mg weekly.  Advised about dietary changes and regular exercise.  - semaglutide, 1 mg/dose, (Ozempic) 4 mg/3 mL injection pen; Inject 0.75 mL (1 mg total) under the skin once a week  Dispense: 9 mL; Refill: 1    3. Flu vaccine need  - influenza vaccine, recombinant, PF, 0.5 mL IM (Flublok)    4. Chest heaviness  Point-of-care EKG performed in office today returned normal sinus rhythm with heart rate 92 bpm.  Unchanged from prior EKG in chart.  Advised to follow up with Cardiology.  May be stress/anxiety induced but I would like patient to f/u with cardiology due to risk factors; obesity, diabetes, HTN.  - POCT ECG      Subjective:      Patient ID: Mili Jose is a 63 y.o. female.    63-year-old female presents for 3-month follow-up diabetes.  Currently on Ozempic and metformin.  She is tolerating medications well.  She is lost approximately 8 pounds since her last office visit which was on 6/4/24.  Today, she reports doing well overall other than increased stress and anxiety due to brother's medical  "status deteriorating due to parkinsonism and dementia, he is currently in a rehab facility and may be staying long-term which she is having difficulty with because she knows he does not want to be there.  However, it is too much for her to handle in the home setting.    She is complaining of intermittent chest pressure, tachycardia and sweats that are occurring when she is relaxing at bedtime. Symptoms have been off and on for approximately 1 to 2 weeks.  She tells me symptoms self resolved fairly quickly after she relaxes. She is wondering if this is due to the increased stress and anxiety she is having recently.   She did see cardiology last year for similar symptoms, had cardiac testing including;   EKG which showed sinus tachycardia.  Nuclear stress test 10/12/2023 returned normal.  Echocardiogram 6/8/2023 returned normal.  Holter monitor 5/31/2023 returned normal.  She was advised to follow-up with Dr. Merritt however she did not do so.     No other concerns at this time.  She is currently asymptomatic and denies daytime symptoms.            The following portions of the patient's history were reviewed and updated as appropriate: allergies, current medications, past family history, past medical history, past social history, and problem list.    Review of Systems   Constitutional: Negative.    HENT: Negative.     Eyes: Negative.    Respiratory: Negative.     Cardiovascular:  Positive for chest pain (pressure, intermittent at night. currently asymptomatic).   Gastrointestinal: Negative.    Endocrine: Negative.    Genitourinary: Negative.    Musculoskeletal: Negative.    Skin: Negative.    Allergic/Immunologic: Negative.    Neurological: Negative.    Hematological: Negative.    Psychiatric/Behavioral: Negative.           Objective:      /70 (BP Location: Left arm, Patient Position: Sitting, Cuff Size: Large)   Pulse 100   Temp (!) 97.3 °F (36.3 °C) (Temporal)   Resp 18   Ht 5' 8\" (1.727 m)   Wt 129 kg " (285 lb 3.2 oz)   SpO2 98%   BMI 43.36 kg/m²          Physical Exam  Vitals and nursing note reviewed.   Constitutional:       General: She is not in acute distress.     Appearance: Normal appearance. She is obese. She is not ill-appearing.   HENT:      Head: Normocephalic and atraumatic.   Cardiovascular:      Rate and Rhythm: Normal rate and regular rhythm.      Pulses: Normal pulses.      Heart sounds: Normal heart sounds.   Pulmonary:      Effort: Pulmonary effort is normal.      Breath sounds: Normal breath sounds.   Neurological:      General: No focal deficit present.      Mental Status: She is alert and oriented to person, place, and time. Mental status is at baseline.   Psychiatric:         Mood and Affect: Mood normal.         Behavior: Behavior normal.         Thought Content: Thought content normal.                    RAHEL Kim

## 2024-10-18 DIAGNOSIS — J30.89 ENVIRONMENTAL AND SEASONAL ALLERGIES: ICD-10-CM

## 2024-10-18 RX ORDER — FLUTICASONE PROPIONATE 50 MCG
SPRAY, SUSPENSION (ML) NASAL
Qty: 16 G | Refills: 5 | Status: SHIPPED | OUTPATIENT
Start: 2024-10-18

## 2024-10-22 LAB
LEFT EYE DIABETIC RETINOPATHY: NORMAL
RIGHT EYE DIABETIC RETINOPATHY: NORMAL

## 2024-10-31 DIAGNOSIS — K21.9 GASTROESOPHAGEAL REFLUX DISEASE WITHOUT ESOPHAGITIS: ICD-10-CM

## 2024-10-31 DIAGNOSIS — I10 ESSENTIAL HYPERTENSION: ICD-10-CM

## 2024-10-31 DIAGNOSIS — E11.9 TYPE 2 DIABETES MELLITUS WITHOUT COMPLICATION, WITHOUT LONG-TERM CURRENT USE OF INSULIN (HCC): ICD-10-CM

## 2024-11-01 RX ORDER — BENAZEPRIL HYDROCHLORIDE 40 MG/1
TABLET ORAL
Qty: 90 TABLET | Refills: 1 | Status: SHIPPED | OUTPATIENT
Start: 2024-11-01

## 2024-11-06 ENCOUNTER — OFFICE VISIT (OUTPATIENT)
Dept: CARDIOLOGY CLINIC | Facility: MEDICAL CENTER | Age: 64
End: 2024-11-06
Payer: COMMERCIAL

## 2024-11-06 VITALS
DIASTOLIC BLOOD PRESSURE: 80 MMHG | SYSTOLIC BLOOD PRESSURE: 120 MMHG | HEART RATE: 99 BPM | OXYGEN SATURATION: 96 % | BODY MASS INDEX: 42.28 KG/M2 | HEIGHT: 68 IN | WEIGHT: 279 LBS

## 2024-11-06 DIAGNOSIS — M10.9 ACUTE GOUT INVOLVING TOE OF RIGHT FOOT, UNSPECIFIED CAUSE: ICD-10-CM

## 2024-11-06 DIAGNOSIS — G25.81 RESTLESS LEG SYNDROME: ICD-10-CM

## 2024-11-06 DIAGNOSIS — R00.0 TACHYCARDIA: Primary | ICD-10-CM

## 2024-11-06 DIAGNOSIS — E66.01 MORBID OBESITY (HCC): ICD-10-CM

## 2024-11-06 DIAGNOSIS — E11.9 TYPE 2 DIABETES MELLITUS WITHOUT COMPLICATION, WITHOUT LONG-TERM CURRENT USE OF INSULIN (HCC): ICD-10-CM

## 2024-11-06 PROCEDURE — 99215 OFFICE O/P EST HI 40 MIN: CPT | Performed by: STUDENT IN AN ORGANIZED HEALTH CARE EDUCATION/TRAINING PROGRAM

## 2024-11-06 RX ORDER — IBUPROFEN 800 MG/1
800 TABLET, FILM COATED ORAL EVERY 8 HOURS PRN
Qty: 21 TABLET | Refills: 0 | Status: SHIPPED | OUTPATIENT
Start: 2024-11-06

## 2024-11-06 NOTE — ASSESSMENT & PLAN NOTE
Lab Results   Component Value Date    HGBA1C 6.1 (H) 09/17/2024   Stable.  As above, patient is working on lifestyle changes and no impact

## 2024-11-06 NOTE — PROGRESS NOTES
[facility] [place] [address]  [address]  [facility] [address]  Phone#  [phone]  Fax#  [phone]  [facility]'s Cardiology Office Consultation             NAME: Mili Jose  AGE: 63 y.o. SEX: female   : 1960   MRN: [id number]    DATE: 2024  TIME: 12:56 PM    Assessment and recommendations    Assessment & Plan  Tachycardia  Stable.  Reviewed patient's ECG from last primary care physician appointment and showed normal sinus rhythm with heart rate of 92.  No further testing indicated at this time.  I discussed that if she has change in symptoms such as palpitations, shortness of breath, dyspnea on exertion, lightheadedness, dizziness; she can call the office, and I will order further testing as appropriate.  Morbid obesity (HCC)  Stable.  She is working on lifestyle modifications including dietary changes as well as initiation of Ozempic.  He is tolerating Ozempic well.  This is being managed by her primary care provider  Type 2 diabetes mellitus without complication, without long-term current use of insulin (HCC)  Lab Results   Component Value Date    HGBA1C 6.1 (H) 2024   Stable.  As above, patient is working on lifestyle changes and no impact  Restless leg syndrome  -consider checking iron levels (can be associated with iron deficiency, but usually with anemia)    Follow up 1 year      ------     Chief Complaint   Patient presents with    New Patient Visit     Referred to by PCP for Tachycardia       HPI:    Mili Jose is a 63 y.o.-year-old female who presents to the cardiology clinic for initial consultation.    Past medical history significant for diabetes, obesity.    She presents for follow-up for tachycardia.  Reports that her primary care provider was concerned about an ECG done in clinic which showed tachycardia.  I reviewed the ECG from 2024.  It showed sinus rhythm with a heart rate of 92.  Occasionally gets chest discomfort that she  attributes to stress.  It is not brought on by exertion or relieved with rest.  Feels like a squeezing sensation.  Reports that this has been ongoing for years.  She previously underwent evaluation with an echocardiogram, stress test, and Holter monitor.  She did not have any evidence of ischemi. Short episode of atrial tachycardia on monitor.  She denies any other abdomen such as lightheadedness, dizziness, shortness of breath, dyspnea on exertion.    Family history significant for mother and brother with atrial fibrillation.  Her brother also had Parkinson's disease and a stroke.    She reports that she recently started on Ozempic, which has been doing well.  Occasionally has nausea for the first day after dose increases, but otherwise denies any side effects.    ------    I personally reviewed the patient's pertinent cardiac imaging and labs in Epic:    10/12/23 NM no ischemia  6/8/23 TTE LV EF 65%, grade 1 diastolic dysfunction    9/17/24 cholesterol 120, triglycerides 90, HDL 57, LDL 45       Past history, family history, social history, current medications, vital signs, recent lab and imaging studies and  prior cardiology studies reviewed independently on this visit.    Allergies   Allergen Reactions    Penicillins        Current Outpatient Medications:     allopurinol (ZYLOPRIM) 300 mg tablet, Take 300 mg by mouth daily, Disp: , Rfl:     amLODIPine (NORVASC) 10 mg tablet, TAKE 1 TABLET BY MOUTH EVERY DAY, Disp: 90 tablet, Rfl: 1    benazepril (LOTENSIN) 40 MG tablet, TAKE 1 TABLET BY MOUTH EVERY DAY, Disp: 90 tablet, Rfl: 1    desloratadine (CLARINEX) 5 MG tablet, TAKE ONE TABLET BY MOUTH DAILY, Disp: 90 tablet, Rfl: 1    dicyclomine (BENTYL) 10 mg capsule, Take 1 capsule (10 mg total) by mouth 4 (four) times a day (before meals and at bedtime), Disp: 30 capsule, Rfl: 0    fluticasone (FLONASE) 50 mcg/act nasal spray, ONE SPRAY IN EACH NOSTRIL EVERY DAY, Disp: 16 g, Rfl: 5    gabapentin (NEURONTIN) 300 mg  capsule, Take 300 mg by mouth 3 (three) times a day, Disp: , Rfl:     ibuprofen (MOTRIN) 800 mg tablet, Take 1 tablet (800 mg total) by mouth 3 (three) times a day Take with meals, Disp: 21 tablet, Rfl: 0    metFORMIN (GLUCOPHAGE) 500 mg tablet, TAKE 1 TABLET BY MOUTH TWICE A DAY WITH FOOD, Disp: 180 tablet, Rfl: 1    montelukast (SINGULAIR) 10 mg tablet, TAKE ONE TABLET BY MOUTH DAILY AT BEDTIME, Disp: 90 tablet, Rfl: 1    omeprazole (PriLOSEC) 20 mg delayed release capsule, TAKE ONE CAPSULE 1/2 HOUR BEFORE BREAKFAST AND ONE CAPSULE 1/2 HOUR BEFORE DINNER, Disp: 180 capsule, Rfl: 1    pravastatin (PRAVACHOL) 10 mg tablet, Take 1 tablet (10 mg total) by mouth daily at bedtime, Disp: 90 tablet, Rfl: 1    semaglutide, 1 mg/dose, (Ozempic) 4 mg/3 mL injection pen, Inject 0.75 mL (1 mg total) under the skin once a week, Disp: 9 mL, Rfl: 1    Past Medical History:   Diagnosis Date    Colon polyp     Diabetes mellitus (HCC)     Fatty liver     GERD (gastroesophageal reflux disease)     Hypertension      Past Surgical History:   Procedure Laterality Date    BACK SURGERY      COLONOSCOPY      FINGER SURGERY      SINUS SURGERY      TUBAL LIGATION      UPPER GASTROINTESTINAL ENDOSCOPY       Family History   Problem Relation Age of Onset    Heart disease Mother     Atrial fibrillation Mother     Thyroid disease Mother     Coronary artery disease Father     Diabetes Father        Social History   reports that she has never smoked. She has never used smokeless tobacco. She reports that she does not drink alcohol and does not use drugs.     Review of Systems   Respiratory:  Positive for chest tightness. Negative for shortness of breath.    Cardiovascular:  Negative for chest pain, palpitations and leg swelling.   Neurological: Negative.    Psychiatric/Behavioral: Negative.         Objective:     Vitals:    11/06/24 1250   BP: 120/80   Pulse: 99   SpO2: 96%     Physical Exam  Vitals reviewed.   Constitutional:       General: She  "is not in acute distress.     Appearance: She is well-developed. She is obese.   HENT:      Head: Normocephalic and atraumatic.   Eyes:      Conjunctiva/sclera: Conjunctivae normal.   Cardiovascular:      Rate and Rhythm: Normal rate and regular rhythm.      Heart sounds: No murmur heard.  Pulmonary:      Effort: Pulmonary effort is normal. No respiratory distress.      Breath sounds: Normal breath sounds.   Musculoskeletal:         General: No swelling.      Cervical back: Neck supple.   Skin:     General: Skin is warm and dry.   Neurological:      Mental Status: She is alert.   Psychiatric:         Mood and Affect: Mood normal.         Pertinent Laboratory/Diagnostic Studies:    Laboratory studies reviewed personally by Erika Cuevas MD    BMP:   Lab Results   Component Value Date    SODIUM 138 09/17/2024    K 4.4 09/17/2024     09/17/2024    CO2 27 09/17/2024    BUN 17 09/17/2024    CREATININE 0.83 09/17/2024    GLUC 97 06/04/2024    CALCIUM 9.2 09/17/2024     CBC:  Lab Results   Component Value Date    WBC 9.68 02/07/2024    HGB 13.3 02/07/2024    HCT 41.8 02/07/2024    MCV 93 02/07/2024     02/07/2024     Lipid Profile:   Lab Results   Component Value Date    HDL 57 09/17/2024     Lab Results   Component Value Date    LDLCALC 45 09/17/2024     Lab Results   Component Value Date    TRIG 90 09/17/2024      Other labs:  Lab Results   Component Value Date    KWZ3MWRJUFZZ 0.901 02/07/2024     Lab Results   Component Value Date    ALT 18 09/17/2024    AST 20 09/17/2024       Imaging Studies:     Pertinent cardiac studies and imaging studies were personally reviewed on this visit and results summarized above.    Erika Cuevas MD, PhD    Portions of the record may have been created with voice recognition software.  Occasional wrong word or \"sound alike\" substitutions may have occurred due to the inherent limitations of voice recognition software.  Read the chart carefully and recognize, using context, " where substitutions have occurred. Please reach out to me directly for any clarifications.

## 2024-11-06 NOTE — ASSESSMENT & PLAN NOTE
PLEASE RETURN TO THE EMERGENCY DEPARTMENT IMMEDIATELY if your symptoms worsen in anyway or in 1-2 days if not improved for re-evaluation.  You should immediately return to the ER for symptoms such as new or worsening pain, difficulty breathing or swallowing, a change in your voice, a feeling of passing out, light headed, dizziness, chest pain, headache, neck pain, rash, abdominal pain or vomiting.    Take your medication as indicated and prescribed.  If you are given an antibiotic then, make sure you get the prescription filled and take the antibiotics until finished.      Please understand that at this time there is no evidence for a more serious underlying process, but that early in the process of an illness or injury, an emergency department workup can be falsely reassuring.  You should contact your family doctor within the next 48 hours for a follow up appointment.        THANK YOU!!!    From [facility] and [facility]    On behalf of the Emergency Department staff at [facility], I would like to thank you for giving us the opportunity to address your health care needs and concerns.    We hope that during your visit, our service was delivered in a professional and caring manner. Please keep [facility] in mind as we walk with you down the path to your own personal wellness.     Please expect an automated text message or email from us so we can ask a few questions about your health and progress. Based on your answers, a clinician may call you back to offer help and instructions.    Please understand that early in the process of an illness or injury, an emergency department workup can be falsely reassuring.  If you notice any worsening, changing or persistent symptoms please call your family doctor or return to the ER immediately.     Tell us how we did during your visit at [email or web address]   and let us know about your experience     Stable.  She is working on lifestyle modifications including dietary changes as well as initiation of Ozempic.  He is tolerating Ozempic well.  This is being managed by her primary care provider

## 2024-11-12 DIAGNOSIS — E11.9 TYPE 2 DIABETES MELLITUS WITHOUT COMPLICATION, WITHOUT LONG-TERM CURRENT USE OF INSULIN (HCC): ICD-10-CM

## 2024-11-12 DIAGNOSIS — E66.01 MORBID OBESITY (HCC): ICD-10-CM

## 2024-11-12 NOTE — TELEPHONE ENCOUNTER
Reason for call: Not A duplicate. Patient stated her pharmacy does not have a refill.   [x] Refill   [] Prior Auth  [] Other:     Office:   [] PCP/Provider -  RAHEL Kim / SHANNAN DODD   [] Specialty/Provider -     Medication: semaglutide, 1 mg/dose, (Ozempic) 4 mg/3 mL injection pen      Dose/Frequency:  Inject 0.75 mL (1 mg total) under the skin once a week     Quantity: 9ml    Pharmacy: [facility]/pharmacy #[id number] - [place], [place] - [address]     Does the patient have enough for 3 days?   [] Yes   [x] No - Send as HP to POD

## 2024-11-13 DIAGNOSIS — I10 ESSENTIAL HYPERTENSION: ICD-10-CM

## 2024-11-13 RX ORDER — AMLODIPINE BESYLATE 10 MG/1
10 TABLET ORAL DAILY
Qty: 90 TABLET | Refills: 1 | Status: SHIPPED | OUTPATIENT
Start: 2024-11-13

## 2024-11-29 DIAGNOSIS — J30.89 ENVIRONMENTAL AND SEASONAL ALLERGIES: ICD-10-CM

## 2024-11-29 RX ORDER — MONTELUKAST SODIUM 10 MG/1
10 TABLET ORAL
Qty: 90 TABLET | Refills: 1 | Status: SHIPPED | OUTPATIENT
Start: 2024-11-29

## 2025-01-03 ENCOUNTER — OFFICE VISIT (OUTPATIENT)
Dept: FAMILY MEDICINE CLINIC | Facility: MEDICAL CENTER | Age: 65
End: 2025-01-03
Payer: COMMERCIAL

## 2025-01-03 ENCOUNTER — APPOINTMENT (OUTPATIENT)
Dept: RADIOLOGY | Facility: MEDICAL CENTER | Age: 65
End: 2025-01-03
Payer: COMMERCIAL

## 2025-01-03 VITALS
SYSTOLIC BLOOD PRESSURE: 100 MMHG | HEIGHT: 68 IN | DIASTOLIC BLOOD PRESSURE: 54 MMHG | WEIGHT: 276.6 LBS | BODY MASS INDEX: 41.92 KG/M2 | HEART RATE: 95 BPM | TEMPERATURE: 97.7 F | RESPIRATION RATE: 18 BRPM | OXYGEN SATURATION: 96 %

## 2025-01-03 DIAGNOSIS — M25.552 PAIN OF LEFT HIP: ICD-10-CM

## 2025-01-03 DIAGNOSIS — E11.9 TYPE 2 DIABETES MELLITUS WITHOUT COMPLICATION, WITHOUT LONG-TERM CURRENT USE OF INSULIN (HCC): ICD-10-CM

## 2025-01-03 DIAGNOSIS — E66.01 MORBID OBESITY (HCC): ICD-10-CM

## 2025-01-03 DIAGNOSIS — Z00.00 MEDICARE ANNUAL WELLNESS VISIT, SUBSEQUENT: Primary | ICD-10-CM

## 2025-01-03 DIAGNOSIS — E08.49 DIABETES DUE TO UNDRL CONDITION W OTH DIABETIC NEURO COMP (HCC): ICD-10-CM

## 2025-01-03 LAB — SL AMB POCT HEMOGLOBIN AIC: 5.8 (ref ?–6.5)

## 2025-01-03 PROCEDURE — 73503 X-RAY EXAM HIP UNI 4/> VIEWS: CPT

## 2025-01-03 PROCEDURE — 99214 OFFICE O/P EST MOD 30 MIN: CPT

## 2025-01-03 PROCEDURE — G0439 PPPS, SUBSEQ VISIT: HCPCS

## 2025-01-03 PROCEDURE — 83036 HEMOGLOBIN GLYCOSYLATED A1C: CPT

## 2025-01-03 NOTE — ASSESSMENT & PLAN NOTE
Encourage patient to follow healthy diet, exercise regularly and try to get weight down.  Continue Ozempic at current dose for diabetes and obesity.

## 2025-01-03 NOTE — PROGRESS NOTES
Diabetic Foot Exam    Patient's shoes and socks removed.    Right Foot/Ankle   Right Foot Inspection  Skin Exam: skin normal, skin intact, dry skin, callus and callus. No warmth, no erythema, no maceration, no abnormal color, no pre-ulcer and no ulcer.     Toe Exam: ROM and strength within normal limits.     Sensory   Vibration: intact  Proprioception: intact  Monofilament testing: intact    Vascular  Capillary refills: < 3 seconds  The right DP pulse is 2+. The right PT pulse is 2+.     Left Foot/Ankle  Left Foot Inspection  Skin Exam: skin normal, skin intact, dry skin and callus. No warmth, no erythema, no maceration, normal color, no pre-ulcer and no ulcer.     Toe Exam: ROM and strength within normal limits.     Sensory   Vibration: intact  Proprioception: intact  Monofilament testing: intact    Vascular  Capillary refills: < 3 seconds  The left DP pulse is 2+. The left PT pulse is 2+.     Assign Risk Category  No deformity present  No loss of protective sensation  No weak pulses  Risk: 0  Name: Mili Jose      : 1960      MRN: [id number]  Encounter Provider: RAHEL Kim  Encounter Date: 1/3/2025   Encounter department: [facility]    Assessment & Plan  Medicare annual wellness visit, subsequent  Declined cervical cancer screening/referral to GYN.  Declines mammogram.  Colonoscopy up-to-date.  Declines all recommended vaccinations.  Return in 3 to 4 months for diabetes follow-up and in 1 year for annual Medicare wellness visit, sooner if needed.       Type 2 diabetes mellitus without complication, without long-term current use of insulin (HCC)  A1c under good control with metformin and Ozempic.  Continue current dose of metformin and Ozempic.  Encouraged patient to follow healthy diet, exercise regularly and try to get weight down.  Lab Results   Component Value Date    HGBA1C 5.8 2025       Orders:    POCT hemoglobin A1c    Pain of left hip  Check x-ray left  hip.  May use Tylenol or ibuprofen as needed for pain relief as well as ice or topical muscle rubs.  Follow-up with orthopedics.  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  Continue Ozempic at current dose for diabetes and obesity.            Preventive health issues were discussed with patient, and age appropriate screening tests were ordered as noted in patient's After Visit Summary. Personalized health advice and appropriate referrals for health education or preventive services given if needed, as noted in patient's After Visit Summary.    History of Present Illness     64-year-old female presents for annual Medicare wellness visit and diabetes follow-up.  She is doing well overall, tolerating Ozempic and metformin well.  She does not need refills at this time.  She is complaining of left hip pain x 2-3 weeks, no fall or injury. Pain is present all the times but worse when laying on left side. Denies numbness, tingling, weakness in  lower extremity.  No other concerns or complaints offered at this time.         Patient Care Team:  RAHEL Kim as PCP - General (Nurse Practitioner)  Darwin Barrera MD (Gastroenterology)    Review of Systems   Constitutional: Negative.    HENT: Negative.     Eyes: Negative.    Respiratory: Negative.     Cardiovascular: Negative.    Gastrointestinal: Negative.    Endocrine: Negative.    Genitourinary: Negative.    Musculoskeletal:         Left hip pain   Skin: Negative.    Allergic/Immunologic: Negative.    Neurological: Negative.    Hematological: Negative.    Psychiatric/Behavioral: Negative.       Medical History Reviewed by provider this encounter:  Tobacco   Allergies  Meds  Problems  Med Hx  Surg Hx  Fam Hx       Annual Wellness Visit Questionnaire   Mili is here for her Subsequent Wellness visit.     Health Risk Assessment:   Patient rates overall health as good. Patient feels that their physical health rating is same. Patient is satisfied with their life. Eyesight was rated as same. Hearing was rated as same. Patient feels that their emotional and mental health rating is same. Patients states they are never, rarely angry. Patient states they are often unusually tired/fatigued. Pain experienced in the last 7 days has been some. Patient's pain rating has been 8/10. Patient states that she has experienced no weight loss or gain in last 6 months.     Depression Screening:   PHQ-2 Score: 0      Fall Risk Screening:   In the past year, patient has experienced: no history of falling in past year      Urinary Incontinence Screening:   Patient has not leaked urine accidently in the last six months.     Home Safety:  Patient does not have trouble with stairs inside or outside of their home. Patient has working smoke alarms and has working carbon monoxide detector. Home safety hazards include: none.     Nutrition:   Current diet is Regular.     Medications:   Patient is not currently taking any over-the-counter supplements. Patient is able to manage medications.     Activities of Daily Living (ADLs)/Instrumental Activities of Daily Living (IADLs):   Walk and transfer into and out of bed and chair?: Yes  Dress and groom yourself?: Yes    Bathe or shower yourself?: Yes    Feed yourself? Yes  Do your laundry/housekeeping?: Yes  Manage your money, pay your bills and track your expenses?: Yes  Make your own meals?: Yes    Do your own shopping?: Yes    Previous Hospitalizations:   Any hospitalizations or ED visits within the last 12 months?: No      Advance Care Planning:   Living will: No      Cognitive Screening:   Provider or family/friend/caregiver concerned regarding  cognition?: No    PREVENTIVE SCREENINGS      Cardiovascular Screening:    General: Screening Current      Diabetes Screening:     General: Screening Not Indicated and History Diabetes      Colorectal Cancer Screening:     General: Screening Current      Breast Cancer Screening:     General: Patient Declines      Cervical Cancer Screening:    General: Patient Declines      Abdominal Aortic Aneurysm (AAA) Screening:        General: Screening Not Indicated      Lung Cancer Screening:     General: Screening Not Indicated      Hepatitis C Screening:    General: Screening Current    Screening, Brief Intervention, and Referral to Treatment (SBIRT)    Screening  Typical number of drinks in a day: 0  Typical number of drinks in a week: 0  Interpretation: Low risk drinking behavior.    Single Item Drug Screening:  How often have you used an illegal drug (including marijuana) or a prescription medication for non-medical reasons in the past year? never    Single Item Drug Screen Score: 0  Interpretation: Negative screen for possible drug use disorder    Other Counseling Topics:   Car/seat belt/driving safety and calcium and vitamin D intake and regular weightbearing exercise.     Social Drivers of Health     Financial Resource Strain: High Risk (11/7/2023)    Overall Financial Resource Strain (CARDIA)     Difficulty of Paying Living Expenses: Very hard   Food Insecurity: No Food Insecurity (1/3/2025)    Hunger Vital Sign     Worried About Running Out of Food in the Last Year: Never true     Ran Out of Food in the Last Year: Never true   Transportation Needs: No Transportation Needs (1/3/2025)    PRAPARE - Transportation     Lack of Transportation (Medical): No     Lack of Transportation (Non-Medical): No   Housing Stability: Low Risk  (1/3/2025)    Housing Stability Vital Sign     Unable to Pay for Housing in the Last Year: No     Number of Times Moved in the Last Year: 0     Homeless in the Last Year: No   Utilities: Not At  "Risk (1/3/2025)    [facility] Utilities     Threatened with loss of utilities: No     No results found.    Objective   /54 (BP Location: Left arm, Patient Position: Sitting, Cuff Size: Standard)   Pulse 95   Temp 97.7 °F (36.5 °C) (Temporal)   Resp 18   Ht 5' 8\" (1.727 m)   Wt 125 kg (276 lb 9.6 oz)   SpO2 96%   BMI 42.06 kg/m²     Physical Exam  Vitals and nursing note reviewed.   Constitutional:       General: She is not in acute distress.     Appearance: Normal appearance. She is obese. She is not ill-appearing.   HENT:      Head: Normocephalic and atraumatic.   Cardiovascular:      Rate and Rhythm: Normal rate and regular rhythm.      Pulses: Normal pulses. no weak pulses.           Dorsalis pedis pulses are 2+ on the right side and 2+ on the left side.        Posterior tibial pulses are 2+ on the right side and 2+ on the left side.      Heart sounds: Normal heart sounds.   Pulmonary:      Effort: Pulmonary effort is normal.      Breath sounds: Normal breath sounds.   Musculoskeletal:      Right hip: Normal.      Left hip: Tenderness present. Normal strength.   Feet:      Right foot:      Skin integrity: Callus and dry skin present. No ulcer, skin breakdown, erythema or warmth.      Left foot:      Skin integrity: Callus and dry skin present. No ulcer, skin breakdown, erythema or warmth.   Skin:     General: Skin is warm and dry.   Neurological:      General: No focal deficit present.      Mental Status: She is alert and oriented to person, place, and time. Mental status is at baseline.   Psychiatric:         Mood and Affect: Mood normal.         Behavior: Behavior normal.         Thought Content: Thought content normal.       "

## 2025-01-03 NOTE — ASSESSMENT & PLAN NOTE
A1c under good control with metformin and Ozempic.  Continue current dose of metformin and Ozempic.  Encouraged patient to follow healthy diet, exercise regularly and try to get weight down.  Lab Results   Component Value Date    HGBA1C 5.8 01/03/2025       Orders:    POCT hemoglobin A1c

## 2025-01-06 ENCOUNTER — RESULTS FOLLOW-UP (OUTPATIENT)
Dept: FAMILY MEDICINE CLINIC | Facility: MEDICAL CENTER | Age: 65
End: 2025-01-06

## 2025-01-23 ENCOUNTER — OFFICE VISIT (OUTPATIENT)
Dept: OBGYN CLINIC | Facility: MEDICAL CENTER | Age: 65
End: 2025-01-23
Payer: COMMERCIAL

## 2025-01-23 VITALS — BODY MASS INDEX: 41.83 KG/M2 | WEIGHT: 276 LBS | HEIGHT: 68 IN

## 2025-01-23 DIAGNOSIS — M25.552 PAIN OF LEFT HIP: ICD-10-CM

## 2025-01-23 DIAGNOSIS — M46.1 SACROILIITIS (HCC): ICD-10-CM

## 2025-01-23 DIAGNOSIS — M70.62 GREATER TROCHANTERIC BURSITIS OF LEFT HIP: ICD-10-CM

## 2025-01-23 DIAGNOSIS — M47.816 SPONDYLOSIS OF LUMBAR REGION WITHOUT MYELOPATHY OR RADICULOPATHY: Primary | ICD-10-CM

## 2025-01-23 PROCEDURE — 99203 OFFICE O/P NEW LOW 30 MIN: CPT | Performed by: STUDENT IN AN ORGANIZED HEALTH CARE EDUCATION/TRAINING PROGRAM

## 2025-01-23 NOTE — PROGRESS NOTES
ASSESSMENT/PLAN:    Problem List Items Addressed This Visit    None  Visit Diagnoses         Spondylosis of lumbar region without myelopathy or radiculopathy    -  Primary    Relevant Orders    Ambulatory Referral to Physical Therapy      Pain of left hip          Greater trochanteric bursitis of left hip        Relevant Orders    Ambulatory Referral to Physical Therapy      Sacroiliitis (HCC)        Relevant Orders    Ambulatory Referral to Physical Therapy            Discussed history, exam, and imaging with patient. Presentation most consistent with greater trochanteric bursitis and left sided sacroiliitis and we will plan for nonoperative management at this time.  Discussed oral/topical medication regimen. Will plan for continued use of the Motrin and Tylenol as needed.  She may also utilize Voltaren gel over her lateral hip and sacroiliac joint area  Discussed injections as a pain adjunct.  This was discussed with her today as she could consider steroid injection to the greater trochanteric bursa in the outpatient setting.  She did defer this today.  She could also consider fluoroscopic guided steroid injections to the SI joint provided by spine and pain management providers.  She also would like to defer this at this time.  Discussed rehabilitation efforts. Will plan for initiating physical therapy with a focus on core strengthening and general lower extremity strengthening.  It is our hopes that when she increases her core strength and lower extremity strength this will help offset imbalances of her pelvis and lower extremity with the hopes of alleviating some of her painful symptoms.  We also discussed weight loss efforts as obesity is a significant risk factor for lower extremity/back pain as well as progression of arthritis. I do believe that with notable weight loss she would have significant relief of her symptoms. Patient notes she has already lost 30 lbs, for which she was commended.   She may partake  in activities of daily living as tolerated with no specific restrictions.  Follow-up with me in 8 to 12 weeks if symptoms persist.  _____________________________________________________  CHIEF COMPLAINT:  Chief Complaint   Patient presents with    Left Hip - Pain       SUBJECTIVE:  Mili Jose is a 64 y.o. year old female who presents for evaluation of her left hip.  She has been experiencing worsening symptoms over the past couple of months.  She notes that she has not suffered a traumatic injury resulting in her painful symptoms.  However, she does take care of her son and her mother and states that this does cause increase strain on her body in general.  She localizes pain to the lateral aspect of her hip and describes it as a sharp moderate pain with direct pressure.  She states that laying directly on her hip exacerbates her pain at night causing her to toss and turn.  She does also localize pain into the posterior aspect of her hip and buttock region.  She denies any radiation of pain down the posterior aspect of her thigh.  She does have a history of discectomy of the L5-S1 level in 2005.  She states that surgery was uncomplicated and did recover with the assistance of physical therapy.  However, it did take several months to regain her strength and normal function.  She does currently utilize Motrin 800 as well as Tylenol arthritis.  She has also reinitiated Robaxin.  She has utilized gabapentin in the past and states that this has also provide her with symptomatic relief.  She does have a multitude of issues of the left lower extremity with a history of fractures of her feet that she does see podiatry for.  This does cause her to ambulate with a mild antalgic gait which she does believe is potentially contributing to her painful symptoms.  She has not seek any recent physical therapy recently.  She is also not interested in injections at this time.      PAST MEDICAL HISTORY:  Past Medical History:    Diagnosis Date    Colon polyp     Diabetes mellitus (HCC)     Fatty liver     GERD (gastroesophageal reflux disease)     Hypertension        PAST SURGICAL HISTORY:  Past Surgical History:   Procedure Laterality Date    BACK SURGERY      COLONOSCOPY      FINGER SURGERY      SINUS SURGERY      TUBAL LIGATION      UPPER GASTROINTESTINAL ENDOSCOPY         FAMILY HISTORY:  Family History   Problem Relation Age of Onset    Heart disease Mother     Atrial fibrillation Mother     Thyroid disease Mother     Coronary artery disease Father     Diabetes Father        SOCIAL HISTORY:  Social History     Tobacco Use    Smoking status: Never    Smokeless tobacco: Never   Vaping Use    Vaping status: Never Used   Substance Use Topics    Alcohol use: Never    Drug use: Never       MEDICATIONS:    Current Outpatient Medications:     allopurinol (ZYLOPRIM) 300 mg tablet, Take 300 mg by mouth daily, Disp: , Rfl:     amLODIPine (NORVASC) 10 mg tablet, TAKE 1 TABLET BY MOUTH EVERY DAY, Disp: 90 tablet, Rfl: 1    benazepril (LOTENSIN) 40 MG tablet, TAKE 1 TABLET BY MOUTH EVERY DAY, Disp: 90 tablet, Rfl: 1    desloratadine (CLARINEX) 5 MG tablet, TAKE ONE TABLET BY MOUTH DAILY, Disp: 90 tablet, Rfl: 1    dicyclomine (BENTYL) 10 mg capsule, Take 1 capsule (10 mg total) by mouth 4 (four) times a day (before meals and at bedtime), Disp: 30 capsule, Rfl: 0    fluticasone (FLONASE) 50 mcg/act nasal spray, ONE SPRAY IN EACH NOSTRIL EVERY DAY, Disp: 16 g, Rfl: 5    gabapentin (NEURONTIN) 300 mg capsule, Take 300 mg by mouth 3 (three) times a day, Disp: , Rfl:     ibuprofen (MOTRIN) 800 mg tablet, Take 1 tablet (800 mg total) by mouth every 8 (eight) hours as needed for mild pain Take with meals, Disp: 21 tablet, Rfl: 0    metFORMIN (GLUCOPHAGE) 500 mg tablet, TAKE 1 TABLET BY MOUTH TWICE A DAY WITH FOOD, Disp: 180 tablet, Rfl: 1    montelukast (SINGULAIR) 10 mg tablet, TAKE ONE TABLET BY MOUTH DAILY AT BEDTIME, Disp: 90 tablet, Rfl: 1     "omeprazole (PriLOSEC) 20 mg delayed release capsule, TAKE ONE CAPSULE 1/2 HOUR BEFORE BREAKFAST AND ONE CAPSULE 1/2 HOUR BEFORE DINNER, Disp: 180 capsule, Rfl: 1    pravastatin (PRAVACHOL) 10 mg tablet, Take 1 tablet (10 mg total) by mouth daily at bedtime, Disp: 90 tablet, Rfl: 1    semaglutide, 1 mg/dose, (Ozempic) 4 mg/3 mL injection pen, Inject 0.75 mL (1 mg total) under the skin once a week, Disp: 9 mL, Rfl: 1    ALLERGIES:  Allergies   Allergen Reactions    Penicillins        Review of systems:   Constitutional: Negative for fatigue, fever or loss of apetite.   HENT: Negative.    Respiratory: Negative for shortness of breath, dyspnea.    Cardiovascular: Negative for chest pain/tightness.   Gastrointestinal: Negative for abdominal pain, N/V.   Endocrine: Negative for cold/heat intolerance, unexplained weight loss/gain.   Genitourinary: Negative for flank pain, dysuria, hematuria.   Musculoskeletal: As in HPI   Skin: Negative for rash.    Neurological: Negative for numbness tingling  Psychiatric/Behavioral: Negative for agitation.  _____________________________________________________  PHYSICAL EXAMINATION:    Height 5' 8\" (1.727 m), weight 125 kg (276 lb).    General: well developed and well nourished, alert, oriented times 3, and appears comfortable  HEENT: Benign, normocephalic, atraumatic  Cardiovascular: WNL    Pulmonary: No wheezing or stridor  Abdomen: Soft, Nontender  Skin: No masses, erythema, lacerations, fluctation, ulcerations  Neurovascular: as per MSK exam below    MUSCULOSKELETAL EXAMINATION:    Lumbar spine  There is  midline tenderness present.    There is  paraspinal tenderness.    Sensation intact to light touch left L2 through S1 dermatomes.   Sensation intact to light touch right L2 through S1 dermatomes   Left Motor: 5/5 iliopsoas, 5/5 quadriceps, 5/5 tibialis anterior, 5/5 extensor hallucis longus, and 5/5 gastrocsoleus.   Right Motor: 5/5 iliopsoas, 5/5 quadriceps, 5/5 tibialis anterior, " 5/5 extensor hallucis longus, and 5/5 gastrocsoleus.   Negative clonus bilaterally.    FADIR: Negative  JAKI: Negative  Stinchfield: Negative  Straight leg raise test is positive Left   The patient is well perfused distally.         _____________________________________________________  STUDIES REVIEWED:  Images personally reviewed by me today     X-rays of the left hip and pelvis obtained on January 3, 2025 demonstrate no acute fracture.  There is degenerative changes of the lumbar spine at L4-S1.  This is consistent with prior discectomy from 2005.  No significant arthritic changes observed of the left hip.  Moderate osteoarthritis observed of the right hip with joint line narrowing, subchondral sclerosis and osteophytosis.  No acute fractures observed.  No obvious lytic or blastic lesions observed.      Scribe Attestation      I,:  Yao Rosa am acting as a scribe while in the presence of the attending physician.:       I,:  Colton Tee MD personally performed the services described in this documentation    as scribed in my presence.:

## 2025-01-27 DIAGNOSIS — J30.89 ENVIRONMENTAL AND SEASONAL ALLERGIES: ICD-10-CM

## 2025-01-27 RX ORDER — DESLORATADINE 5 MG/1
5 TABLET ORAL DAILY
Qty: 90 TABLET | Refills: 1 | Status: SHIPPED | OUTPATIENT
Start: 2025-01-27

## 2025-03-06 DIAGNOSIS — I10 ESSENTIAL HYPERTENSION: ICD-10-CM

## 2025-03-06 DIAGNOSIS — E11.9 TYPE 2 DIABETES MELLITUS WITHOUT COMPLICATION, WITHOUT LONG-TERM CURRENT USE OF INSULIN (HCC): ICD-10-CM

## 2025-03-07 RX ORDER — BENAZEPRIL HYDROCHLORIDE 40 MG/1
40 TABLET ORAL DAILY
Qty: 90 TABLET | Refills: 1 | Status: SHIPPED | OUTPATIENT
Start: 2025-03-07

## 2025-03-07 RX ORDER — PRAVASTATIN SODIUM 10 MG
10 TABLET ORAL
Qty: 90 TABLET | Refills: 1 | Status: SHIPPED | OUTPATIENT
Start: 2025-03-07

## 2025-04-09 ENCOUNTER — OFFICE VISIT (OUTPATIENT)
Dept: FAMILY MEDICINE CLINIC | Facility: MEDICAL CENTER | Age: 65
End: 2025-04-09
Payer: COMMERCIAL

## 2025-04-09 ENCOUNTER — APPOINTMENT (OUTPATIENT)
Dept: LAB | Facility: MEDICAL CENTER | Age: 65
End: 2025-04-09
Payer: COMMERCIAL

## 2025-04-09 VITALS
WEIGHT: 266.6 LBS | OXYGEN SATURATION: 99 % | SYSTOLIC BLOOD PRESSURE: 116 MMHG | DIASTOLIC BLOOD PRESSURE: 70 MMHG | BODY MASS INDEX: 40.41 KG/M2 | HEIGHT: 68 IN | HEART RATE: 98 BPM | RESPIRATION RATE: 18 BRPM | TEMPERATURE: 97.5 F

## 2025-04-09 DIAGNOSIS — E11.9 TYPE 2 DIABETES MELLITUS WITHOUT COMPLICATION, WITHOUT LONG-TERM CURRENT USE OF INSULIN (HCC): ICD-10-CM

## 2025-04-09 DIAGNOSIS — M62.838 MUSCLE SPASM: ICD-10-CM

## 2025-04-09 DIAGNOSIS — J30.89 ENVIRONMENTAL AND SEASONAL ALLERGIES: ICD-10-CM

## 2025-04-09 DIAGNOSIS — E61.1 IRON DEFICIENCY: ICD-10-CM

## 2025-04-09 DIAGNOSIS — E66.01 MORBID OBESITY (HCC): ICD-10-CM

## 2025-04-09 DIAGNOSIS — I10 ESSENTIAL HYPERTENSION: ICD-10-CM

## 2025-04-09 DIAGNOSIS — I10 PRIMARY HYPERTENSION: Primary | ICD-10-CM

## 2025-04-09 LAB
ANION GAP SERPL CALCULATED.3IONS-SCNC: 14 MMOL/L (ref 4–13)
BUN SERPL-MCNC: 18 MG/DL (ref 5–25)
CALCIUM SERPL-MCNC: 9.9 MG/DL (ref 8.4–10.2)
CHLORIDE SERPL-SCNC: 102 MMOL/L (ref 96–108)
CO2 SERPL-SCNC: 22 MMOL/L (ref 21–32)
CREAT SERPL-MCNC: 0.87 MG/DL (ref 0.6–1.3)
FERRITIN SERPL-MCNC: 113 NG/ML (ref 30–307)
GFR SERPL CREATININE-BSD FRML MDRD: 71 ML/MIN/1.73SQ M
GLUCOSE P FAST SERPL-MCNC: 87 MG/DL (ref 65–99)
IRON SATN MFR SERPL: 22 % (ref 15–50)
IRON SERPL-MCNC: 74 UG/DL (ref 50–212)
MAGNESIUM SERPL-MCNC: 2.1 MG/DL (ref 1.9–2.7)
POTASSIUM SERPL-SCNC: 5 MMOL/L (ref 3.5–5.3)
SL AMB POCT HEMOGLOBIN AIC: 5.7 (ref ?–6.5)
SODIUM SERPL-SCNC: 138 MMOL/L (ref 135–147)
TIBC SERPL-MCNC: 341.6 UG/DL (ref 250–450)
TRANSFERRIN SERPL-MCNC: 244 MG/DL (ref 203–362)
UIBC SERPL-MCNC: 268 UG/DL (ref 155–355)

## 2025-04-09 PROCEDURE — 83735 ASSAY OF MAGNESIUM: CPT

## 2025-04-09 PROCEDURE — 82728 ASSAY OF FERRITIN: CPT

## 2025-04-09 PROCEDURE — 99214 OFFICE O/P EST MOD 30 MIN: CPT

## 2025-04-09 PROCEDURE — 80048 BASIC METABOLIC PNL TOTAL CA: CPT

## 2025-04-09 PROCEDURE — 83540 ASSAY OF IRON: CPT

## 2025-04-09 PROCEDURE — 83036 HEMOGLOBIN GLYCOSYLATED A1C: CPT

## 2025-04-09 PROCEDURE — G2211 COMPLEX E/M VISIT ADD ON: HCPCS

## 2025-04-09 PROCEDURE — 83550 IRON BINDING TEST: CPT

## 2025-04-09 PROCEDURE — 36415 COLL VENOUS BLD VENIPUNCTURE: CPT

## 2025-04-09 RX ORDER — AMLODIPINE BESYLATE 10 MG/1
10 TABLET ORAL DAILY
Qty: 90 TABLET | Refills: 1 | Status: SHIPPED | OUTPATIENT
Start: 2025-04-09

## 2025-04-09 RX ORDER — DESLORATADINE 5 MG/1
5 TABLET ORAL DAILY
Qty: 90 TABLET | Refills: 1 | Status: SHIPPED | OUTPATIENT
Start: 2025-04-09 | End: 2025-04-09

## 2025-04-09 RX ORDER — DESLORATADINE 5 MG/1
5 TABLET ORAL DAILY
Qty: 90 TABLET | Refills: 1 | Status: SHIPPED | OUTPATIENT
Start: 2025-04-09

## 2025-04-09 NOTE — PROGRESS NOTES
Name: Mili Jose      : 1960      MRN: [id number]  Encounter Provider: RAHEL Kim  Encounter Date: 2025   Encounter department: [facility] [place]  :  Assessment & Plan  Primary hypertension  Under  excellent control with current management.  Continue amlodipine and benazepril.       Type 2 diabetes mellitus without complication, without long-term current use of insulin (HCC)  Under excellent control with current management.  Continue metformin and Ozempic.  Lab Results   Component Value Date    HGBA1C 5.7 2025       Orders:    POCT hemoglobin A1c    metFORMIN (GLUCOPHAGE) 500 mg tablet; Take 1 tablet (500 mg total) by mouth 2 (two) times a day with meals    semaglutide, 1 mg/dose, (Ozempic) 4 mg/3 mL injection pen; Inject 0.75 mL (1 mg total) under the skin once a week    Essential hypertension    Orders:    amLODIPine (NORVASC) 10 mg tablet; Take 1 tablet (10 mg total) by mouth daily    Environmental and seasonal allergies  Refilled Clarinex per patient request.  Orders:    desloratadine (CLARINEX) 5 MG tablet; Take 1 tablet (5 mg total) by mouth daily    Muscle spasm  Check labs as below.  Follow-up with podiatrist as discussed.    Orders:    Basic metabolic panel; Future    Magnesium; Future    Iron Panel (Includes Ferritin, Iron Sat%, Iron, and TIBC); Future    Iron deficiency  History of iron deficiency in the past, also with muscle cramps we will check iron panel now.     Orders:    Iron Panel (Includes Ferritin, Iron Sat%, Iron, and TIBC); Future    Morbid obesity (HCC)  Encouraged to continue healthy diet, regular exercise.  Continue current dose of Ozempic.                History of Present Illness   64-year-old female presents for follow-up diabetes and hypertension.  She is doing well with all of her medications.   She is complaining of muscle spasms bilateral upper legs ongoing for several months, usually only occurs at bedtime when she is relaxed. She  "is on Gabapentin for neuropathy in her feet, prescribed by podiatrist. She takes allopurinol for gout.   She tells me she has a history of RLS, she tells me she was diagnosed over 20 years ago, she is not on medication for this.  She tells me this does not feel like her RLS, it feels more like muscle cramps and spasms in b/l anterior upper legs.   She is well hydrated, drinks about 8 bottles of water per day.   No other concerns at this time.          Review of Systems   Constitutional: Negative.    HENT: Negative.     Eyes: Negative.    Respiratory: Negative.     Cardiovascular: Negative.    Gastrointestinal: Negative.    Endocrine: Negative.    Genitourinary: Negative.    Musculoskeletal: Negative.         Muscle spasms upper legs anterior b/l   Skin: Negative.    Allergic/Immunologic: Negative.    Neurological: Negative.    Hematological: Negative.    Psychiatric/Behavioral: Negative.         Objective   /70 (BP Location: Left arm, Patient Position: Sitting, Cuff Size: Large)   Pulse 98   Temp 97.5 °F (36.4 °C) (Temporal)   Resp 18   Ht 5' 8\" (1.727 m)   Wt 121 kg (266 lb 9.6 oz)   SpO2 99%   BMI 40.54 kg/m²      Physical Exam  Vitals and nursing note reviewed.   Constitutional:       General: She is not in acute distress.     Appearance: Normal appearance. She is obese. She is not ill-appearing.   HENT:      Head: Normocephalic and atraumatic.   Cardiovascular:      Rate and Rhythm: Normal rate.   Pulmonary:      Effort: Pulmonary effort is normal.   Musculoskeletal:         General: Normal range of motion.      Cervical back: Normal range of motion and neck supple.   Skin:     General: Skin is warm and dry.   Neurological:      General: No focal deficit present.      Mental Status: She is alert and oriented to person, place, and time. Mental status is at baseline.      Motor: No weakness.      Gait: Gait normal.   Psychiatric:         Mood and Affect: Mood normal.         Behavior: Behavior normal. "         Thought Content: Thought content normal.

## 2025-04-09 NOTE — ASSESSMENT & PLAN NOTE
Refilled Clarinex per patient request.  Orders:    desloratadine (CLARINEX) 5 MG tablet; Take 1 tablet (5 mg total) by mouth daily

## 2025-04-09 NOTE — ASSESSMENT & PLAN NOTE
Orders:    semaglutide, 1 mg/dose, (Ozempic) 4 mg/3 mL injection pen; Inject 0.75 mL (1 mg total) under the skin once a week

## 2025-04-09 NOTE — ASSESSMENT & PLAN NOTE
Under excellent control with current management.  Continue metformin and Ozempic.  Lab Results   Component Value Date    HGBA1C 5.7 04/09/2025       Orders:    POCT hemoglobin A1c    metFORMIN (GLUCOPHAGE) 500 mg tablet; Take 1 tablet (500 mg total) by mouth 2 (two) times a day with meals    semaglutide, 1 mg/dose, (Ozempic) 4 mg/3 mL injection pen; Inject 0.75 mL (1 mg total) under the skin once a week

## 2025-04-09 NOTE — PROGRESS NOTES
Diabetic Foot Exam    Patient's shoes and socks removed.    Right Foot/Ankle   Right Foot Inspection  Skin Exam: skin normal, skin intact, dry skin, callus and callus. No warmth, no erythema, no maceration, no abnormal color, no pre-ulcer and no ulcer.     Toe Exam: ROM and strength within normal limits.     Sensory   Monofilament testing: intact    Vascular  The right DP pulse is 2+. The right PT pulse is 2+.     Left Foot/Ankle  Left Foot Inspection  Skin Exam: skin normal, skin intact, dry skin and callus. No warmth, no erythema, no maceration, normal color, no pre-ulcer and no ulcer.     Toe Exam: ROM and strength within normal limits.     Sensory   Monofilament testing: intact    Vascular  The left DP pulse is 2+. The left PT pulse is 2+.     Assign Risk Category  No deformity present  No loss of protective sensation  No weak pulses  Risk: 0

## 2025-04-10 ENCOUNTER — RESULTS FOLLOW-UP (OUTPATIENT)
Dept: FAMILY MEDICINE CLINIC | Facility: MEDICAL CENTER | Age: 65
End: 2025-04-10

## 2025-04-15 DIAGNOSIS — M62.838 MUSCLE SPASM: Primary | ICD-10-CM

## 2025-04-15 RX ORDER — METHOCARBAMOL 500 MG/1
500 TABLET, FILM COATED ORAL 4 TIMES DAILY
Qty: 20 TABLET | Refills: 0 | Status: SHIPPED | OUTPATIENT
Start: 2025-04-15

## 2025-04-15 NOTE — TELEPHONE ENCOUNTER
Pt called to make Malathi MCGINNIS aware she spoke with her foot doctor about the muscle spasms in her legs/feet.  He told her as long as she is staying hydrated, she should ask her PCP to prescribe muscle relaxers for her.  She also said she has taken Robaxin in the past.  If OK to send Rx, she uses [facility] in [place].  If she needs to come in again to discuss with Malathi MCGINNIS, please reach out to pt to set up an appt.  Thank you!    Clerical:  Pt asked if we could mail her a copy of her recent lab results.

## 2025-05-12 ENCOUNTER — TELEPHONE (OUTPATIENT)
Age: 65
End: 2025-05-12

## 2025-05-12 NOTE — TELEPHONE ENCOUNTER
Patient received a text message to schedule an appointment with Dr Lilly for 5/19. Asking if can be seen sooner due to severe allergies , warm transfer to clerical to further assist

## 2025-05-13 ENCOUNTER — OFFICE VISIT (OUTPATIENT)
Dept: FAMILY MEDICINE CLINIC | Facility: CLINIC | Age: 65
End: 2025-05-13
Payer: COMMERCIAL

## 2025-05-13 VITALS
BODY MASS INDEX: 40.1 KG/M2 | WEIGHT: 264.6 LBS | DIASTOLIC BLOOD PRESSURE: 60 MMHG | OXYGEN SATURATION: 98 % | HEART RATE: 90 BPM | HEIGHT: 68 IN | TEMPERATURE: 97.2 F | SYSTOLIC BLOOD PRESSURE: 94 MMHG

## 2025-05-13 DIAGNOSIS — R05.9 COUGH IN ADULT: ICD-10-CM

## 2025-05-13 DIAGNOSIS — I15.9 SECONDARY HYPERTENSION: ICD-10-CM

## 2025-05-13 DIAGNOSIS — E08.49 DIABETES DUE TO UNDRL CONDITION W OTH DIABETIC NEURO COMP (HCC): ICD-10-CM

## 2025-05-13 DIAGNOSIS — J01.00 ACUTE MAXILLARY SINUSITIS, RECURRENCE NOT SPECIFIED: Primary | ICD-10-CM

## 2025-05-13 PROCEDURE — G2211 COMPLEX E/M VISIT ADD ON: HCPCS | Performed by: INTERNAL MEDICINE

## 2025-05-13 PROCEDURE — 99214 OFFICE O/P EST MOD 30 MIN: CPT | Performed by: INTERNAL MEDICINE

## 2025-05-13 RX ORDER — DEXTROMETHORPHAN HBR. AND GUAIFENESIN 10; 100 MG/5ML; MG/5ML
5 SOLUTION ORAL EVERY 12 HOURS
Qty: 118 ML | Refills: 1 | Status: SHIPPED | OUTPATIENT
Start: 2025-05-13

## 2025-05-13 RX ORDER — SULFAMETHOXAZOLE AND TRIMETHOPRIM 800; 160 MG/1; MG/1
1 TABLET ORAL 2 TIMES DAILY
Qty: 14 TABLET | Refills: 0 | Status: SHIPPED | OUTPATIENT
Start: 2025-05-13 | End: 2025-05-20

## 2025-05-13 NOTE — ASSESSMENT & PLAN NOTE
Lab Results   Component Value Date    HGBA1C 5.7 04/09/2025   Her last blood sugar and A1c were normal

## 2025-05-13 NOTE — ASSESSMENT & PLAN NOTE
She has had a mostly dry cough in the same time.  Which I feel is probably from postnasal drip    Orders:    dextromethorphan-guaiFENesin (ROBITUSSIN-DM)  mg/5 mL oral liquid; Take 5 mL by mouth every 12 (twelve) hours

## 2025-05-13 NOTE — PROGRESS NOTES
Name: Mili Jose      : 1960      MRN: [id number]  Encounter Provider: Constance Singh MD  Encounter Date: 2025   Encounter department: [facility]  :  Assessment & Plan  Acute maxillary sinusitis, recurrence not specified  Patient has had a cough for about 2 weeks with nasal congestion and a nose and purulent discharge.  Will treat her for sinusitis since she also has a headache.  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  Which I feel is probably from postnasal drip    Orders:    dextromethorphan-guaiFENesin (ROBITUSSIN-DM)  mg/5 mL oral liquid; Take 5 mL by mouth every 12 (twelve) hours           History of Present Illness   Sinusitis  This is a new problem. The current episode started 1 to 4 weeks ago. The problem has been gradually worsening since onset. There has been no fever. Her pain is at a severity of 4/10. The pain is mild. Associated symptoms include congestion, coughing, ear pain, headaches, sinus pressure and a sore throat. Pertinent negatives include no shortness of breath. Past treatments include oral decongestants, acetaminophen and saline nose sprays. The treatment provided mild relief.     Review of Systems   Constitutional:  Positive for fatigue. Negative for fever.   HENT:  Positive for congestion, ear pain, nosebleeds, postnasal drip, sinus pressure and sore throat.    Eyes: Negative.    Respiratory:  Positive for cough. Negative for chest tightness, shortness of breath and wheezing.    Cardiovascular: Negative.   "  Gastrointestinal: Negative.    Endocrine: Negative.    Genitourinary: Negative.    Musculoskeletal: Negative.    Allergic/Immunologic: Negative for immunocompromised state.   Neurological:  Positive for headaches.   Hematological: Negative.    Psychiatric/Behavioral: Negative.         Objective   BP 94/60 (BP Location: Left arm, Patient Position: Sitting, Cuff Size: Extra-Large)   Pulse 90   Temp (!) 97.2 °F (36.2 °C) (Temporal)   Ht 5' 8\" (1.727 m)   Wt 120 kg (264 lb 9.6 oz)   SpO2 98%   BMI 40.23 kg/m²      Physical Exam  Constitutional:       Appearance: She is obese.   HENT:      Head: Normocephalic.      Right Ear: Tympanic membrane normal.      Left Ear: Tympanic membrane normal.      Nose: Congestion present.      Mouth/Throat:      Mouth: Mucous membranes are moist.      Tonsils: No tonsillar exudate.   Eyes:      Conjunctiva/sclera: Conjunctivae normal.      Pupils: Pupils are equal, round, and reactive to light.   Cardiovascular:      Rate and Rhythm: Normal rate and regular rhythm.   Pulmonary:      Effort: Pulmonary effort is normal.      Breath sounds: Normal breath sounds.   Abdominal:      Palpations: Abdomen is soft.   Musculoskeletal:      Cervical back: Normal range of motion and neck supple.   Lymphadenopathy:      Cervical: No cervical adenopathy.   Skin:     General: Skin is warm and dry.      Findings: No rash.   Neurological:      General: No focal deficit present.      Mental Status: She is alert and oriented to person, place, and time.   Psychiatric:         Mood and Affect: Mood normal.         Behavior: Behavior normal.         "

## 2025-05-13 NOTE — ASSESSMENT & PLAN NOTE
Patient has had a cough for about 2 weeks with nasal congestion and a nose and purulent discharge.  Will treat her for sinusitis since she also has a headache.  With Bactrim she is pen allergic    Orders:    sulfamethoxazole-trimethoprim (BACTRIM DS) 800-160 mg per tablet; Take 1 tablet by mouth 2 (two) times a day for 7 days

## 2025-05-22 ENCOUNTER — VBI (OUTPATIENT)
Dept: ADMINISTRATIVE | Facility: OTHER | Age: 65
End: 2025-05-22

## 2025-05-22 NOTE — TELEPHONE ENCOUNTER
05/22/25 12:35 PM     Chart reviewed for Mammogram ; nothing is submitted to the patient's insurance at this time.     John Hughes MA   [facility]

## 2025-06-06 ENCOUNTER — OFFICE VISIT (OUTPATIENT)
Dept: GASTROENTEROLOGY | Facility: CLINIC | Age: 65
End: 2025-06-06
Payer: COMMERCIAL

## 2025-06-06 VITALS
BODY MASS INDEX: 40.47 KG/M2 | SYSTOLIC BLOOD PRESSURE: 105 MMHG | HEART RATE: 97 BPM | HEIGHT: 68 IN | DIASTOLIC BLOOD PRESSURE: 73 MMHG | WEIGHT: 267 LBS

## 2025-06-06 DIAGNOSIS — Z86.0100 HX OF COLONIC POLYPS: ICD-10-CM

## 2025-06-06 DIAGNOSIS — R10.11 RUQ PAIN: ICD-10-CM

## 2025-06-06 DIAGNOSIS — K21.9 GASTROESOPHAGEAL REFLUX DISEASE WITHOUT ESOPHAGITIS: Primary | ICD-10-CM

## 2025-06-06 PROCEDURE — 99214 OFFICE O/P EST MOD 30 MIN: CPT | Performed by: PHYSICIAN ASSISTANT

## 2025-06-06 NOTE — PROGRESS NOTES
"Name: Mili Jose      : 1960      MRN: [id number]  Encounter Provider: Olga Munguia PA-C  Encounter Date: 2025   Encounter department: [facility] GASTROENTEROLOGY [place] [address] [address]  :  Assessment & Plan  Gastroesophageal reflux disease without esophagitis  Patient with history of GERD symptoms reflux symptoms are controlled on omeprazole, does have history of hiatal hernia, discussed importance of continued weight loss       Hx of colonic polyps  Colonoscopy due in , noted to have adenomatous colon polyp on colonoscopy        RUQ pain  Patient's discomfort could be related to hepatomegaly due to fatty infiltration, plan for ultrasound of right upper quadrant which will evaluate liver as well as for any biliary abnormality in setting of recent weight loss due to Ozempic  Orders:    US right upper quadrant; Future    We will see her in 1 year for follow-up but did advise to call if any problems or questions occur in the interim    History of Present Illness   Mili Jose is a 64 y.o. female who presents for office visit for follow-up to medications.  Patient has been taking omeprazole 20 mg daily.  She reports that this has been controlling her reflux symptoms.  Had EGD and colonoscopy as outlined below in  with recommended colonoscopy in 7 years.  Patient reports that she occasionally gets right upper quadrant pain.  Does report she lost weight since she has been taking Ozempic.  She otherwise states that she gets the right upper quadrant pain intermittently and this is not seem to be associated with food, still has gallbladder.  Patient does have history of fatty liver, history of diabetes.  HPI    Review of Systems A complete review of systems is negative other than that noted above in the HPI.      Current Medications[1]  Objective   /73 (BP Location: Left arm, Patient Position: Sitting, Cuff Size: Large)   Pulse 97   Ht 5' 8\" (1.727 m)   Wt 121 kg (267 " lb)   BMI 40.60 kg/m²     Physical Exam   General Alert acute distress  Heart normal S1-S2   Lungs clear to auscultation  Abdomen soft positive bowel sounds nontender nondistended        Lab Results: I personally reviewed relevant lab results.       Results for orders placed during the hospital encounter of 02/22/23    EGD    Impression  #1.  Small hiatal hernia, sliding-type.  RECOMMENDATION:  Await pathology results            Darwin Barrera MD    Colonoscopy-2/23  IMPRESSION:  1.  2 small polyps removed.  2.  Scattered diverticulosis    Path-  A. Stomach, antrum, biopsy:  -Gastric antral mucosa with chronic inactive gastritis.  -Negative for Helicobacter pylori organisms on H&E stain and immunostain.      B. Sigmoid colon, polyp, polypectomy:  -Tubular adenoma, negative for high grade dysplasia.      C. Rectum, polyp, polypectomy:  -Colonic mucosa with no significant histopathologic change.                  [1]   Current Outpatient Medications   Medication Sig Dispense Refill    allopurinol (ZYLOPRIM) 300 mg tablet Take 300 mg by mouth in the morning.      amLODIPine (NORVASC) 10 mg tablet Take 1 tablet (10 mg total) by mouth daily 90 tablet 1    benazepril (LOTENSIN) 40 MG tablet TAKE 1 TABLET BY MOUTH EVERY DAY 90 tablet 1    desloratadine (CLARINEX) 5 MG tablet Take 1 tablet (5 mg total) by mouth daily 90 tablet 1    dicyclomine (BENTYL) 10 mg capsule Take 1 capsule (10 mg total) by mouth 4 (four) times a day (before meals and at bedtime) 30 capsule 0    fluticasone (FLONASE) 50 mcg/act nasal spray ONE SPRAY IN EACH NOSTRIL EVERY DAY 16 g 5    gabapentin (NEURONTIN) 300 mg capsule Take 300 mg by mouth in the morning and 300 mg in the evening and 300 mg before bedtime.      ibuprofen (MOTRIN) 800 mg tablet Take 1 tablet (800 mg total) by mouth every 8 (eight) hours as needed for mild pain Take with meals 21 tablet 0    metFORMIN (GLUCOPHAGE) 500 mg tablet Take 1 tablet (500 mg total) by mouth 2 (two) times a  day with meals 180 tablet 1    methocarbamol (ROBAXIN) 500 mg tablet Take 1 tablet (500 mg total) by mouth 4 (four) times a day 20 tablet 0    montelukast (SINGULAIR) 10 mg tablet TAKE ONE TABLET BY MOUTH DAILY AT BEDTIME 90 tablet 1    omeprazole (PriLOSEC) 20 mg delayed release capsule TAKE ONE CAPSULE 1/2 HOUR BEFORE BREAKFAST AND ONE CAPSULE 1/2 HOUR BEFORE DINNER 180 capsule 1    pravastatin (PRAVACHOL) 10 mg tablet TAKE 1 TABLET BY MOUTH DAILY AT BEDTIME 90 tablet 1    semaglutide, 1 mg/dose, (Ozempic) 4 mg/3 mL injection pen Inject 0.75 mL (1 mg total) under the skin once a week 9 mL 1    dextromethorphan-guaiFENesin (ROBITUSSIN-DM)  mg/5 mL oral liquid Take 5 mL by mouth every 12 (twelve) hours (Patient not taking: Reported on 6/6/2025) 118 mL 1     No current facility-administered medications for this visit.

## 2025-06-06 NOTE — ASSESSMENT & PLAN NOTE
Patient with history of GERD symptoms reflux symptoms are controlled on omeprazole, does have history of hiatal hernia, discussed importance of continued weight loss

## 2025-06-10 ENCOUNTER — HOSPITAL ENCOUNTER (OUTPATIENT)
Dept: RADIOLOGY | Facility: MEDICAL CENTER | Age: 65
Discharge: HOME | End: 2025-06-10
Attending: PHYSICIAN ASSISTANT
Payer: COMMERCIAL

## 2025-06-10 DIAGNOSIS — R10.11 RUQ PAIN: ICD-10-CM

## 2025-06-10 PROCEDURE — 76705 ECHO EXAM OF ABDOMEN: CPT

## 2025-06-12 PROBLEM — J01.00 ACUTE MAXILLARY SINUSITIS: Status: RESOLVED | Noted: 2025-05-13 | Resolved: 2025-06-12

## 2025-06-12 PROBLEM — R05.9 COUGH IN ADULT: Status: RESOLVED | Noted: 2025-05-13 | Resolved: 2025-06-12

## 2025-06-18 ENCOUNTER — RESULTS FOLLOW-UP (OUTPATIENT)
Age: 65
End: 2025-06-18

## 2025-06-19 DIAGNOSIS — K21.9 GASTROESOPHAGEAL REFLUX DISEASE WITHOUT ESOPHAGITIS: ICD-10-CM

## 2025-06-19 RX ORDER — OMEPRAZOLE 20 MG/1
CAPSULE, DELAYED RELEASE ORAL
Qty: 180 CAPSULE | Refills: 1 | Status: SHIPPED | OUTPATIENT
Start: 2025-06-19

## 2025-06-23 ENCOUNTER — TELEPHONE (OUTPATIENT)
Age: 65
End: 2025-06-23

## 2025-06-23 DIAGNOSIS — E11.9 TYPE 2 DIABETES MELLITUS WITHOUT COMPLICATION, WITHOUT LONG-TERM CURRENT USE OF INSULIN (HCC): ICD-10-CM

## 2025-06-23 DIAGNOSIS — M62.838 MUSCLE SPASM: ICD-10-CM

## 2025-06-23 NOTE — TELEPHONE ENCOUNTER
Reason for call:   [x] Refill   [] Prior Auth  [] Other:     Office:   [x] PCP/Provider -   [] Specialty/Provider -     Medication: methocarbamol (ROBAXIN) 500 mg tablet     Dose/Frequency: 1 tablet 3 times per day as needed    Quantity: 20    Pharmacy: [facility]    Does the patient have enough for 3 days?   [] Yes   [x] No - Send as HP to POD

## 2025-06-23 NOTE — TELEPHONE ENCOUNTER
Reason for call:   [x] Refill   [] Prior Auth  [] Other:     Office:   [x] PCP/Provider - [facility]  [] Specialty/Provider -     Medication:     pravastatin (PRAVACHOL) 10 mg tablet       Dose/Frequency: 1 tablet at bedtime    Quantity: 90    Pharmacy: [facility]    Does the patient have enough for 3 days?   [x] Yes   [] No - Send as HP to POD

## 2025-06-24 DIAGNOSIS — M62.838 MUSCLE SPASM: ICD-10-CM

## 2025-06-24 DIAGNOSIS — E11.9 TYPE 2 DIABETES MELLITUS WITHOUT COMPLICATION, WITHOUT LONG-TERM CURRENT USE OF INSULIN (HCC): ICD-10-CM

## 2025-06-24 RX ORDER — PRAVASTATIN SODIUM 10 MG
10 TABLET ORAL
Qty: 90 TABLET | Refills: 1 | Status: SHIPPED | OUTPATIENT
Start: 2025-06-24

## 2025-06-24 RX ORDER — PRAVASTATIN SODIUM 10 MG
10 TABLET ORAL
Qty: 90 TABLET | Refills: 1 | Status: SHIPPED | OUTPATIENT
Start: 2025-06-24 | End: 2025-06-24 | Stop reason: SDUPTHER

## 2025-06-24 RX ORDER — METHOCARBAMOL 500 MG/1
500 TABLET, FILM COATED ORAL 4 TIMES DAILY
Qty: 20 TABLET | Refills: 0 | Status: SHIPPED | OUTPATIENT
Start: 2025-06-24 | End: 2025-06-24 | Stop reason: SDUPTHER

## 2025-06-24 RX ORDER — METHOCARBAMOL 500 MG/1
500 TABLET, FILM COATED ORAL 3 TIMES DAILY PRN
Qty: 20 TABLET | Refills: 0 | Status: SHIPPED | OUTPATIENT
Start: 2025-06-24

## 2025-06-24 NOTE — TELEPHONE ENCOUNTER
Patient called back regarding the prescriptions. Dr Goode was the ordering provider and she is transferring to Dr Kramer in [place]. She wanted to know if the refill request will be sent to the pharmacy.    Please advise and contact patient.  Thank you.

## 2025-07-22 DIAGNOSIS — J01.00 ACUTE MAXILLARY SINUSITIS, RECURRENCE NOT SPECIFIED: ICD-10-CM

## 2025-07-25 RX ORDER — SULFAMETHOXAZOLE AND TRIMETHOPRIM 800; 160 MG/1; MG/1
1 TABLET ORAL 2 TIMES DAILY
Qty: 14 TABLET | Refills: 0 | OUTPATIENT
Start: 2025-07-25 | End: 2025-08-01

## 2025-08-13 ENCOUNTER — VBI (OUTPATIENT)
Dept: ADMINISTRATIVE | Facility: OTHER | Age: 65
End: 2025-08-13

## 2025-08-19 DIAGNOSIS — E11.9 TYPE 2 DIABETES MELLITUS WITHOUT COMPLICATION, WITHOUT LONG-TERM CURRENT USE OF INSULIN (HCC): ICD-10-CM
